# Patient Record
Sex: FEMALE | Race: WHITE | HISPANIC OR LATINO | Employment: PART TIME | URBAN - METROPOLITAN AREA
[De-identification: names, ages, dates, MRNs, and addresses within clinical notes are randomized per-mention and may not be internally consistent; named-entity substitution may affect disease eponyms.]

---

## 2018-08-31 ENCOUNTER — OFFICE VISIT (OUTPATIENT)
Dept: FAMILY MEDICINE CLINIC | Facility: CLINIC | Age: 43
End: 2018-08-31
Payer: COMMERCIAL

## 2018-08-31 VITALS
BODY MASS INDEX: 35.33 KG/M2 | HEART RATE: 72 BPM | DIASTOLIC BLOOD PRESSURE: 88 MMHG | HEIGHT: 62 IN | TEMPERATURE: 98.2 F | RESPIRATION RATE: 16 BRPM | WEIGHT: 192 LBS | OXYGEN SATURATION: 99 % | SYSTOLIC BLOOD PRESSURE: 120 MMHG

## 2018-08-31 DIAGNOSIS — Z13.1 DIABETES MELLITUS SCREENING: ICD-10-CM

## 2018-08-31 DIAGNOSIS — K21.9 GASTROESOPHAGEAL REFLUX DISEASE WITHOUT ESOPHAGITIS: Primary | ICD-10-CM

## 2018-08-31 DIAGNOSIS — Z13.220 NEED FOR LIPID SCREENING: ICD-10-CM

## 2018-08-31 DIAGNOSIS — Z01.419 WELL WOMAN EXAM: ICD-10-CM

## 2018-08-31 PROCEDURE — 99203 OFFICE O/P NEW LOW 30 MIN: CPT | Performed by: FAMILY MEDICINE

## 2018-08-31 PROCEDURE — 3008F BODY MASS INDEX DOCD: CPT | Performed by: FAMILY MEDICINE

## 2018-08-31 RX ORDER — PANTOPRAZOLE SODIUM 40 MG/1
40 TABLET, DELAYED RELEASE ORAL DAILY
Qty: 30 TABLET | Refills: 2 | Status: SHIPPED | OUTPATIENT
Start: 2018-08-31 | End: 2018-12-31

## 2018-08-31 NOTE — PROGRESS NOTES
Assessment/Plan:    No problem-specific Assessment & Plan notes found for this encounter  Diagnoses and all orders for this visit:    Gastroesophageal reflux disease without esophagitis  Stable controlled advise to continue same medicaiton and dose  -     pantoprazole (PROTONIX) 40 mg tablet; Take 1 tablet (40 mg total) by mouth daily for 30 days    Diabetes mellitus screening  -     Comprehensive metabolic panel; Future    Need for lipid screening  -     Lipid panel; Future    Well woman exam  -     Ambulatory referral to Obstetrics / Gynecology; Future     Follow up in 6 months to 1 year     Subjective:      Patient ID: Nirmala Pérez is a 41 y o  female  Patient is here loss care  She has a history of acid reflux disease and takes pantoprazole daily for her symptoms  Denies any side effects from medications  She denies any other medical problems at this time  She would like to have blood work done and also referred for and call use  She is not a smoker        The following portions of the patient's history were reviewed and updated as appropriate:   She  has no past medical history on file  She   Patient Active Problem List    Diagnosis Date Noted    Gastroesophageal reflux disease without esophagitis 08/31/2018    Diabetes mellitus screening 08/31/2018    Need for lipid screening 08/31/2018    Well woman exam 08/31/2018     She  has no past surgical history on file  Her family history is not on file  She  has no tobacco, alcohol, and drug history on file  Current Outpatient Prescriptions   Medication Sig Dispense Refill    pantoprazole (PROTONIX) 40 mg tablet Take 1 tablet (40 mg total) by mouth daily for 30 days 30 tablet 2     No current facility-administered medications for this visit  No current outpatient prescriptions on file prior to visit  No current facility-administered medications on file prior to visit  She has No Known Allergies       Review of Systems Constitutional: Negative for activity change, appetite change, fatigue and fever  HENT: Negative for congestion and ear discharge  Respiratory: Negative for cough and shortness of breath  Cardiovascular: Negative for chest pain and palpitations  Gastrointestinal: Negative for diarrhea and nausea  Musculoskeletal: Negative for arthralgias and back pain  Skin: Negative for color change and rash  Neurological: Negative for dizziness and headaches  Psychiatric/Behavioral: Negative for agitation and behavioral problems  Objective:      /88   Pulse 72   Temp 98 2 °F (36 8 °C) (Tympanic)   Resp 16   Ht 5' 2" (1 575 m)   Wt 87 1 kg (192 lb)   SpO2 99%   BMI 35 12 kg/m²          Physical Exam   Constitutional: She is oriented to person, place, and time  She appears well-developed and well-nourished  No distress  HENT:   Head: Normocephalic and atraumatic  Nose: Nose normal    Mouth/Throat: Oropharynx is clear and moist    Eyes: Conjunctivae are normal  Pupils are equal, round, and reactive to light  Cardiovascular: Normal rate, regular rhythm and normal heart sounds  No murmur heard  Pulmonary/Chest: Effort normal and breath sounds normal  No respiratory distress  She has no wheezes  Abdominal: Soft  Bowel sounds are normal  She exhibits no distension  There is no tenderness  Neurological: She is alert and oriented to person, place, and time  Skin: Skin is warm and dry  No rash noted  She is not diaphoretic  No erythema  Psychiatric: She has a normal mood and affect

## 2018-10-05 ENCOUNTER — OFFICE VISIT (OUTPATIENT)
Dept: OBGYN CLINIC | Facility: MEDICAL CENTER | Age: 43
End: 2018-10-05
Payer: COMMERCIAL

## 2018-10-05 VITALS
SYSTOLIC BLOOD PRESSURE: 122 MMHG | WEIGHT: 191.4 LBS | DIASTOLIC BLOOD PRESSURE: 68 MMHG | HEIGHT: 62 IN | BODY MASS INDEX: 35.22 KG/M2

## 2018-10-05 DIAGNOSIS — Z80.3 FAMILY HISTORY OF BREAST CANCER: ICD-10-CM

## 2018-10-05 DIAGNOSIS — R10.2 PELVIC PAIN: ICD-10-CM

## 2018-10-05 DIAGNOSIS — Z11.51 ENCOUNTER FOR SCREENING FOR HUMAN PAPILLOMAVIRUS (HPV): ICD-10-CM

## 2018-10-05 DIAGNOSIS — Z12.39 ENCOUNTER FOR SCREENING FOR MALIGNANT NEOPLASM OF BREAST: ICD-10-CM

## 2018-10-05 DIAGNOSIS — Z01.419 ENCOUNTER FOR GYNECOLOGICAL EXAMINATION (GENERAL) (ROUTINE) WITHOUT ABNORMAL FINDINGS: Primary | ICD-10-CM

## 2018-10-05 DIAGNOSIS — K25.7 CHRONIC GASTRIC ULCER WITHOUT HEMORRHAGE AND WITHOUT PERFORATION: ICD-10-CM

## 2018-10-05 DIAGNOSIS — K59.09 CHRONIC CONSTIPATION: ICD-10-CM

## 2018-10-05 DIAGNOSIS — Z12.4 CERVICAL CANCER SCREENING: ICD-10-CM

## 2018-10-05 PROBLEM — Z13.1 DIABETES MELLITUS SCREENING: Status: RESOLVED | Noted: 2018-08-31 | Resolved: 2018-10-05

## 2018-10-05 PROBLEM — Z13.220 NEED FOR LIPID SCREENING: Status: RESOLVED | Noted: 2018-08-31 | Resolved: 2018-10-05

## 2018-10-05 PROCEDURE — 87624 HPV HI-RISK TYP POOLED RSLT: CPT | Performed by: NURSE PRACTITIONER

## 2018-10-05 PROCEDURE — S0610 ANNUAL GYNECOLOGICAL EXAMINA: HCPCS | Performed by: NURSE PRACTITIONER

## 2018-10-05 PROCEDURE — G0145 SCR C/V CYTO,THINLAYER,RESCR: HCPCS | Performed by: NURSE PRACTITIONER

## 2018-10-05 RX ORDER — LEVOCETIRIZINE DIHYDROCHLORIDE 5 MG/1
5 TABLET, FILM COATED ORAL EVERY EVENING
Status: ON HOLD | COMMUNITY
End: 2019-02-05 | Stop reason: ALTCHOICE

## 2018-10-05 NOTE — ASSESSMENT & PLAN NOTE
C/o constipation for several months  She denies rectal bleeding  Reviewed dietary recommendations and advised to increase fluid and fiber intake  Also discussed OTC meds for stool softening and laxative  Referral given for GI for this complaint as well as h/o gastric ulcer  Consider this on diff for pelvic pain

## 2018-10-05 NOTE — ASSESSMENT & PLAN NOTE
C/o LLQ pain when laying on L side  Also with c/o constipation  Exam unremarkable  Pt is unable to provide urine spec for UPT but reports no preg concerns  She declines STI testing and reports no risk factors  Advised pelvic US and GI consult  Will advise as indicated

## 2018-10-05 NOTE — ASSESSMENT & PLAN NOTE
Maternal 1st cousin with breast ca at age 43  Recent diagnosis  Keagan Danilo is not sure whether or not her cousin has had genetic testing  Discussed opportunities for genetic counseling  She will consider

## 2018-10-05 NOTE — ASSESSMENT & PLAN NOTE
Normal findings on routine gyn exam  Advised monthly SBE, annual CBE and baseline screening mammo at age 38  Reviewed ASCCP guidelines and recommended pap with cotesting q3 yrs for this low risk patient  STI testing was offered and the patient declines; she reports low risk  The patient desires to continue current contraceptive method  Diet/activity recommendations  RTO in one year or sooner PRN

## 2018-10-09 LAB
HPV HR 12 DNA CVX QL NAA+PROBE: NEGATIVE
HPV16 DNA CVX QL NAA+PROBE: NEGATIVE
HPV18 DNA CVX QL NAA+PROBE: NEGATIVE

## 2018-10-10 LAB
LAB AP GYN PRIMARY INTERPRETATION: NORMAL
LAB AP LMP: NORMAL
Lab: NORMAL

## 2018-10-11 NOTE — PROGRESS NOTES
Assessment/Plan:    Family history of breast cancer  Maternal 1st cousin with breast ca at age 43  Recent diagnosis  Clementina Up is not sure whether or not her cousin has had genetic testing  Discussed opportunities for genetic counseling  She will consider  Chronic constipation  C/o constipation for several months  She denies rectal bleeding  Reviewed dietary recommendations and advised to increase fluid and fiber intake  Also discussed OTC meds for stool softening and laxative  Referral given for GI for this complaint as well as h/o gastric ulcer  Consider this on diff for pelvic pain  Pelvic pain  C/o LLQ pain when laying on L side  Also with c/o constipation  Exam unremarkable  Pt is unable to provide urine spec for UPT but reports no preg concerns  She declines STI testing and reports no risk factors  Advised pelvic US and GI consult  Will advise as indicated  Encounter for gynecological examination (general) (routine) without abnormal findings  Normal findings on routine gyn exam  Advised monthly SBE, annual CBE and baseline screening mammo at age 38  Reviewed ASCCP guidelines and recommended pap with cotesting q3 yrs for this low risk patient  STI testing was offered and the patient declines; she reports low risk  The patient desires to continue current contraceptive method  Diet/activity recommendations  RTO in one year or sooner PRN  Diagnoses and all orders for this visit:    Encounter for gynecological examination (general) (routine) without abnormal findings    Encounter for screening for malignant neoplasm of breast  -     Mammo screening bilateral w cad; Future    Cervical cancer screening  -     Liquid-based pap, screening  -     HPV High Risk; Future  -     HPV High Risk    Encounter for screening for human papillomavirus (HPV)  -     Liquid-based pap, screening  -     HPV High Risk;  Future  -     HPV High Risk    Family history of breast cancer    Chronic constipation  - Ambulatory referral to Gastroenterology; Future    Pelvic pain  -     US pelvis complete w transvaginal; Future    Chronic gastric ulcer without hemorrhage and without perforation  -     Ambulatory referral to Gastroenterology; Future    Other orders  -     levocetirizine (XYZAL) 5 MG tablet; Take 5 mg by mouth every evening          Subjective:      Patient ID: Donny Braun is a 41 y o  female  This new patient presents for routine annual gyn exam  Prior records are not present thus hx is per pt  Amara Lay is a 41 y o  [id number]  PMHx notable for gastric ulcer, chronic constipation  Gyn hx neg for abn pap or STI  FH notable for breast ca in mat 1st cousin (43y o )    C/o chronic left sided pelvic pain when laying on left side  She is not sure if this correlates to sx of constipation  She denies acute gyn complaints otherwise  Menses are regular and light to mod  She denies pelvic breast concerns, abnormal discharge, bowel/bladder dysfunction, depression/anxiety   and monogamous  She denies STI concerns  Withdrawal for contraception  Kids are well         The following portions of the patient's history were reviewed and updated as appropriate: allergies, current medications, past family history, past medical history, past social history, past surgical history and problem list     Review of Systems   Constitutional: Negative  HENT: Negative  Eyes: Negative  Respiratory: Negative  Cardiovascular: Negative  Gastrointestinal: Negative  Endocrine: Negative  Genitourinary: Negative  Musculoskeletal: Negative  Skin: Negative  Allergic/Immunologic: Negative  Neurological: Negative  Hematological: Negative  Psychiatric/Behavioral: Negative            Objective:      /68 (BP Location: Left arm, Cuff Size: Large)   Ht 5' 1 5" (1 562 m)   Wt 86 8 kg (191 lb 6 4 oz)   LMP 09/26/2018   BMI 35 58 kg/m²          Physical Exam   Constitutional: She is oriented to person, place, and time  She appears well-developed and well-nourished  HENT:   Head: Normocephalic and atraumatic  Eyes: Pupils are equal, round, and reactive to light  EOM are normal    Neck: Normal range of motion  Neck supple  No thyromegaly present  Cardiovascular: Normal rate, regular rhythm and normal heart sounds  Pulmonary/Chest: Effort normal and breath sounds normal  No respiratory distress  She has no wheezes  She has no rales  She exhibits no mass, no tenderness and no deformity  Right breast exhibits no inverted nipple, no mass, no nipple discharge, no skin change and no tenderness  Left breast exhibits no inverted nipple, no mass, no nipple discharge, no skin change and no tenderness  Breasts are symmetrical    Abdominal: Soft  She exhibits no distension and no mass  There is no splenomegaly or hepatomegaly  There is no tenderness  There is no rebound and no guarding  Genitourinary: Rectum normal, vagina normal and uterus normal  No breast swelling, tenderness or discharge  No labial fusion  There is no rash, tenderness, lesion or injury on the right labia  There is no rash, tenderness, lesion or injury on the left labia  Cervix exhibits no motion tenderness, no discharge and no friability  Right adnexum displays no mass, no tenderness and no fullness  Left adnexum displays no mass, no tenderness and no fullness  No erythema, tenderness or bleeding in the vagina  No foreign body in the vagina  No vaginal discharge found  Musculoskeletal: Normal range of motion  Lymphadenopathy:     She has no cervical adenopathy  She has no axillary adenopathy  Neurological: She is alert and oriented to person, place, and time  No cranial nerve deficit  Skin: Skin is warm and dry  No rash noted  No cyanosis  Nails show no clubbing  Psychiatric: She has a normal mood and affect   Her speech is normal and behavior is normal  Judgment and thought content normal  Cognition and memory are normal

## 2018-10-12 ENCOUNTER — TELEPHONE (OUTPATIENT)
Dept: OBGYN CLINIC | Facility: CLINIC | Age: 43
End: 2018-10-12

## 2018-10-12 NOTE — TELEPHONE ENCOUNTER
----- Message from Kale Castro, [address] sent at 10/11/2018  4:37 PM EDT -----  Normal pap and neg HPV   Please notify patient

## 2018-10-18 ENCOUNTER — OFFICE VISIT (OUTPATIENT)
Dept: FAMILY MEDICINE CLINIC | Facility: CLINIC | Age: 43
End: 2018-10-18
Payer: COMMERCIAL

## 2018-10-18 VITALS
SYSTOLIC BLOOD PRESSURE: 108 MMHG | TEMPERATURE: 97.1 F | BODY MASS INDEX: 35.91 KG/M2 | OXYGEN SATURATION: 99 % | DIASTOLIC BLOOD PRESSURE: 74 MMHG | HEIGHT: 62 IN | HEART RATE: 78 BPM | WEIGHT: 195.13 LBS | RESPIRATION RATE: 18 BRPM

## 2018-10-18 DIAGNOSIS — G43.809 OTHER MIGRAINE WITHOUT STATUS MIGRAINOSUS, NOT INTRACTABLE: ICD-10-CM

## 2018-10-18 DIAGNOSIS — J01.20 ACUTE NON-RECURRENT ETHMOIDAL SINUSITIS: Primary | ICD-10-CM

## 2018-10-18 PROBLEM — G43.909 MIGRAINE: Status: ACTIVE | Noted: 2018-10-18

## 2018-10-18 PROCEDURE — 99214 OFFICE O/P EST MOD 30 MIN: CPT | Performed by: NURSE PRACTITIONER

## 2018-10-18 RX ORDER — BUTALBITAL, ACETAMINOPHEN AND CAFFEINE 300; 40; 50 MG/1; MG/1; MG/1
1 CAPSULE ORAL 2 TIMES DAILY
Qty: 60 CAPSULE | Refills: 0 | Status: SHIPPED | OUTPATIENT
Start: 2018-10-18 | End: 2018-11-02 | Stop reason: ALTCHOICE

## 2018-10-18 RX ORDER — AMOXICILLIN AND CLAVULANATE POTASSIUM 875; 125 MG/1; MG/1
1 TABLET, FILM COATED ORAL EVERY 12 HOURS SCHEDULED
Qty: 14 TABLET | Refills: 0 | Status: SHIPPED | OUTPATIENT
Start: 2018-10-18 | End: 2018-10-25

## 2018-10-18 NOTE — PROGRESS NOTES
Assessment/Plan:    No problem-specific Assessment & Plan notes found for this encounter  Diagnoses and all orders for this visit:    Acute non-recurrent ethmoidal sinusitis  -     amoxicillin-clavulanate (AUGMENTIN) 875-125 mg per tablet; Take 1 tablet by mouth every 12 (twelve) hours for 7 days    Other migraine without status migrainosus, not intractable  -     Butalbital-APAP-Caffeine -40 MG CAPS; Take 1 capsule by mouth 2 (two) times a day for 30 days          Subjective:      Patient ID: Juanjo Lima is a 41 y o  female  Pt is here with reports of congestion and headache  She does have allergies  She currently has nasal congestion and sore throat  She has been having very bad headaches  Headaches started two weeks ago  Head pain is from left side to right side, across top of head   She is taking tylenol  But this isnt working  She is taking two tabs twice a day  She has been taking xyzal daily         The following portions of the patient's history were reviewed and updated as appropriate:   She  has a past medical history of Acid reflux disease; Allergic rhinitis, seasonal; Asthma; Migraine; and Ulcer of gastroesophageal junction  She   Patient Active Problem List    Diagnosis Date Noted    Migraine 10/18/2018    Acute non-recurrent ethmoidal sinusitis 10/18/2018    Chronic constipation 10/05/2018    Family history of breast cancer 10/05/2018    Pelvic pain 10/05/2018    Chronic gastric ulcer without hemorrhage and without perforation 10/05/2018    Gastroesophageal reflux disease without esophagitis 08/31/2018    Encounter for gynecological examination (general) (routine) without abnormal findings 08/31/2018     She  has a past surgical history that includes [place] tooth extraction    Her family history includes Anxiety disorder in her brother, daughter, mother, and sister; Asthma in her son; Diabetes in her maternal grandmother; Hyperlipidemia in her mother; No Known Problems in her brother, brother, brother, maternal grandfather, paternal grandfather, paternal grandmother, and son  She  reports that she has never smoked  She has never used smokeless tobacco  She reports that she drinks alcohol  She reports that she does not use drugs  Current Outpatient Prescriptions   Medication Sig Dispense Refill    amoxicillin-clavulanate (AUGMENTIN) 875-125 mg per tablet Take 1 tablet by mouth every 12 (twelve) hours for 7 days 14 tablet 0    Butalbital-APAP-Caffeine -40 MG CAPS Take 1 capsule by mouth 2 (two) times a day for 30 days 60 capsule 0    levocetirizine (XYZAL) 5 MG tablet Take 5 mg by mouth every evening      pantoprazole (PROTONIX) 40 mg tablet Take 1 tablet (40 mg total) by mouth daily for 30 days 30 tablet 2     No current facility-administered medications for this visit  Current Outpatient Prescriptions on File Prior to Visit   Medication Sig    levocetirizine (XYZAL) 5 MG tablet Take 5 mg by mouth every evening    pantoprazole (PROTONIX) 40 mg tablet Take 1 tablet (40 mg total) by mouth daily for 30 days     No current facility-administered medications on file prior to visit  She has No Known Allergies       Review of Systems   Constitutional: Positive for fatigue  Negative for chills and fever  HENT: Positive for congestion and sore throat  Eyes: Negative for visual disturbance  Respiratory: Negative for cough, shortness of breath and wheezing  Cardiovascular: Negative for chest pain  Gastrointestinal: Positive for nausea  Endocrine: Negative for polydipsia and polyuria  Musculoskeletal: Negative for neck pain and neck stiffness  Skin: Negative for pallor and rash  Allergic/Immunologic: Positive for environmental allergies  Negative for immunocompromised state  Neurological: Positive for headaches  Negative for weakness  Hematological: Positive for adenopathy  All other systems reviewed and are negative          Objective:      BP 108/74 (BP Location: Left arm, Patient Position: Sitting)   Pulse 78   Temp (!) 97 1 °F (36 2 °C)   Resp 18   Ht 5' 1 5" (1 562 m)   Wt 88 5 kg (195 lb 2 oz)   LMP 09/26/2018   SpO2 99%   BMI 36 27 kg/m²          Physical Exam   Constitutional: She is oriented to person, place, and time  She appears well-developed and well-nourished  No distress  HENT:   Head: Normocephalic and atraumatic  Right Ear: External ear normal    Left Ear: External ear normal    Nose: Nose normal    Mouth/Throat: Oropharynx is clear and moist  No oropharyngeal exudate  Frontal sinus tenderness  Swollen nasal turbinates   Eyes: Pupils are equal, round, and reactive to light  Conjunctivae and EOM are normal  Right eye exhibits no discharge  Left eye exhibits no discharge  No scleral icterus  Neck: Normal range of motion  Neck supple  Cardiovascular: Normal rate, regular rhythm and normal heart sounds  Exam reveals no gallop and no friction rub  No murmur heard  Pulmonary/Chest: Effort normal and breath sounds normal  No respiratory distress  She has no wheezes  She has no rales  Abdominal: Soft  Musculoskeletal: Normal range of motion  Lymphadenopathy:     She has no cervical adenopathy  Neurological: She is alert and oriented to person, place, and time  She exhibits normal muscle tone  Coordination normal    Skin: Skin is warm and dry  No rash noted  She is not diaphoretic  Psychiatric: She has a normal mood and affect  Her behavior is normal  Judgment and thought content normal    Nursing note and vitals reviewed

## 2018-10-18 NOTE — PATIENT INSTRUCTIONS
Sinus infection- take antibiotics as prescribed  You may take a decongestant, such as Sudafed   Drink plenty of water and get rest  Migraine headache- refill Fiorcet

## 2018-10-24 ENCOUNTER — HOSPITAL ENCOUNTER (OUTPATIENT)
Dept: RADIOLOGY | Facility: MEDICAL CENTER | Age: 43
Discharge: HOME | End: 2018-10-24
Payer: COMMERCIAL

## 2018-10-24 DIAGNOSIS — Z12.39 ENCOUNTER FOR SCREENING FOR MALIGNANT NEOPLASM OF BREAST: ICD-10-CM

## 2018-10-24 DIAGNOSIS — R10.2 PELVIC PAIN: ICD-10-CM

## 2018-10-24 PROCEDURE — 77067 SCR MAMMO BI INCL CAD: CPT

## 2018-10-24 PROCEDURE — 76856 US EXAM PELVIC COMPLETE: CPT

## 2018-10-24 PROCEDURE — 76830 TRANSVAGINAL US NON-OB: CPT

## 2018-10-29 ENCOUNTER — TELEPHONE (OUTPATIENT)
Dept: OBGYN CLINIC | Facility: CLINIC | Age: 43
End: 2018-10-29

## 2018-10-29 NOTE — TELEPHONE ENCOUNTER
----- Message from Nicole Finney sent at 10/26/2018  2:53 PM EDT -----  Normal pelvic US   Please notify patient   F/u with PCP to discuss LLQ pain and h/o constipation

## 2018-10-29 NOTE — TELEPHONE ENCOUNTER
Patient is aware of pelvic u/s results  Is also aware she needs to f/u with her PCP in regards to the LLQ pain and h/o constipation

## 2018-11-02 ENCOUNTER — OFFICE VISIT (OUTPATIENT)
Dept: FAMILY MEDICINE CLINIC | Facility: CLINIC | Age: 43
End: 2018-11-02
Payer: COMMERCIAL

## 2018-11-02 VITALS
TEMPERATURE: 97.9 F | SYSTOLIC BLOOD PRESSURE: 108 MMHG | HEART RATE: 86 BPM | WEIGHT: 195 LBS | OXYGEN SATURATION: 98 % | HEIGHT: 62 IN | DIASTOLIC BLOOD PRESSURE: 70 MMHG | BODY MASS INDEX: 35.88 KG/M2

## 2018-11-02 DIAGNOSIS — G43.809 OTHER MIGRAINE WITHOUT STATUS MIGRAINOSUS, NOT INTRACTABLE: ICD-10-CM

## 2018-11-02 DIAGNOSIS — Z91.09 ENVIRONMENTAL ALLERGIES: Primary | ICD-10-CM

## 2018-11-02 PROBLEM — R10.2 PELVIC PAIN: Status: RESOLVED | Noted: 2018-10-05 | Resolved: 2018-11-02

## 2018-11-02 PROBLEM — J01.20 ACUTE NON-RECURRENT ETHMOIDAL SINUSITIS: Status: RESOLVED | Noted: 2018-10-18 | Resolved: 2018-11-02

## 2018-11-02 PROBLEM — Z01.419 ENCOUNTER FOR GYNECOLOGICAL EXAMINATION (GENERAL) (ROUTINE) WITHOUT ABNORMAL FINDINGS: Status: RESOLVED | Noted: 2018-08-31 | Resolved: 2018-11-02

## 2018-11-02 PROCEDURE — 99214 OFFICE O/P EST MOD 30 MIN: CPT | Performed by: FAMILY MEDICINE

## 2018-11-02 PROCEDURE — 1036F TOBACCO NON-USER: CPT | Performed by: FAMILY MEDICINE

## 2018-11-02 PROCEDURE — 3008F BODY MASS INDEX DOCD: CPT | Performed by: FAMILY MEDICINE

## 2018-11-02 RX ORDER — MOMETASONE FUROATE 50 UG/1
2 SPRAY, METERED NASAL DAILY
Qty: 17 G | Refills: 0 | Status: SHIPPED | OUTPATIENT
Start: 2018-11-02 | End: 2019-01-25

## 2018-11-02 RX ORDER — SUMATRIPTAN 50 MG/1
50 TABLET, FILM COATED ORAL ONCE AS NEEDED
Qty: 9 TABLET | Refills: 0 | Status: SHIPPED | OUTPATIENT
Start: 2018-11-02 | End: 2019-01-25

## 2018-11-02 RX ORDER — METHYLPREDNISOLONE 4 MG/1
TABLET ORAL
Qty: 21 TABLET | Refills: 0 | Status: SHIPPED | OUTPATIENT
Start: 2018-11-02 | End: 2019-01-25

## 2018-11-02 NOTE — PROGRESS NOTES
Luda Storm 1975 female MRN: [id number]    Acute Visit        ASSESSMENT/PLAN  Diagnoses and all orders for this visit:    Environmental allergies  -     Methylprednisolone 4 MG TBPK; Use as directed on package  -     mometasone (NASONEX) 50 mcg/act nasal spray; 2 sprays into each nostril daily    Other migraine without status migrainosus, not intractable  -     SUMAtriptan (IMITREX) 50 mg tablet; Take 1 tablet (50 mg total) by mouth once as needed for migraine for up to 1 dose          Will treat with steroids and Nasonex steroid nasal spray for the allergies  She is on Xyzal   For the migraine she can use Imitrex as needed  If her migraines are persistent and frequent she needs to return or go see Neurology to discuss prophylactic medication  Call in 1 week if not better    Future Appointments  Date Time Provider Cathy Huerta   12/3/2018 4:40 PM Augie Martinez MD [facility]   10/9/2019 10:20 AM QUITA [facility]        SUBJECTIVE  CC: Sinusitis       She was seen on October 18th for sinus problems  Was treated with Augmentin  She completed the abx  She has a history of allergies is on Xyzal which she states is not helping  Over the years she has tried all the different antihistamines I e  Allegra Zyrtec and Claritin with no relief  She was on Flonase but she states it was not helping  She tried Sudafed with no relief  Today she complains of continued congestion and post nasal drip, pressure in head  Worse at night  She has occasional migraines as well  She gets nausea with these headaches  She was prescribed Fioricet which does not seem to help  She takes Tylenol  Can't take NSAIDs due to stomach ulcer  Sinusitis   Associated symptoms include congestion and headaches  Pertinent negatives include no coughing or shortness of breath       Luda Storm is a 41 y o  female who presented for an acute visit complaining of  Review of Systems Constitutional: Negative for activity change  HENT: Positive for congestion, postnasal drip and sinus pain  Respiratory: Negative for cough, chest tightness and shortness of breath  Cardiovascular: Negative for chest pain and leg swelling  Gastrointestinal: Negative  Neurological: Positive for headaches  Psychiatric/Behavioral: Negative for dysphoric mood  The patient is not nervous/anxious          Historical Information   The patient history was reviewed as follows:  Past Medical History:   Diagnosis Date    Acid reflux disease     Allergic rhinitis, seasonal     Asthma     Migraine     Ulcer of gastroesophageal junction      Past Surgical History:   Procedure Laterality Date    WISDOM TOOTH EXTRACTION       Family History   Problem Relation Age of Onset    Anxiety disorder Mother     Hyperlipidemia Mother     Anxiety disorder Sister     No Known Problems Brother     Anxiety disorder Daughter     Asthma Son     Diabetes Maternal Grandmother     No Known Problems Maternal Grandfather     No Known Problems Paternal Grandmother     No Known Problems Paternal Grandfather     No Known Problems Son     No Known Problems Brother     Anxiety disorder Brother     No Known Problems Brother       Social History   History   Alcohol Use    Yes     History   Drug Use No     History   Smoking Status    Never Smoker   Smokeless Tobacco    Never Used       Medications:   Meds/Allergies   Current Outpatient Prescriptions   Medication Sig Dispense Refill    levocetirizine (XYZAL) 5 MG tablet Take 5 mg by mouth every evening      Methylprednisolone 4 MG TBPK Use as directed on package 21 tablet 0    mometasone (NASONEX) 50 mcg/act nasal spray 2 sprays into each nostril daily 17 g 0    pantoprazole (PROTONIX) 40 mg tablet Take 1 tablet (40 mg total) by mouth daily for 30 days 30 tablet 2    SUMAtriptan (IMITREX) 50 mg tablet Take 1 tablet (50 mg total) by mouth once as needed for migraine for up to 1 dose 9 tablet 0     No current facility-administered medications for this visit  No Known Allergies    OBJECTIVE  Vitals:   Vitals:    11/02/18 0807   BP: 108/70   Pulse: 86   Temp: 97 9 °F (36 6 °C)   SpO2: 98%   Weight: 88 5 kg (195 lb)   Height: 5' 1 5" (1 562 m)       Invasive Devices          No matching active lines, drains, or airways          Physical Exam   Constitutional: She is oriented to person, place, and time  She appears well-developed and well-nourished  No distress  HENT:   Right Ear: Hearing, tympanic membrane, external ear and ear canal normal    Left Ear: Hearing, tympanic membrane, external ear and ear canal normal    Nose: Mucosal edema present  Right sinus exhibits maxillary sinus tenderness and frontal sinus tenderness  Left sinus exhibits maxillary sinus tenderness and frontal sinus tenderness  Mouth/Throat: Uvula is midline and mucous membranes are normal  No oropharyngeal exudate or posterior oropharyngeal erythema  Clear postnasal drainage   Eyes: Pupils are equal, round, and reactive to light  Conjunctivae are normal    Cardiovascular: Normal rate and normal heart sounds  Exam reveals no gallop and no friction rub  No murmur heard  Pulmonary/Chest: Effort normal and breath sounds normal  No respiratory distress  She has no wheezes  She has no rales  Lymphadenopathy:     She has no cervical adenopathy  Neurological: She is alert and oriented to person, place, and time  No cranial nerve deficit  Skin: Skin is warm  No rash noted  Psychiatric: She has a normal mood and affect  Her behavior is normal  Judgment and thought content normal    Nursing note and vitals reviewed  Lab:  I have personally reviewed all pertinent results

## 2018-11-02 NOTE — PATIENT INSTRUCTIONS
Allergies   AMBULATORY CARE:   Allergies  are an immune system reaction to a substance called an allergen  Your immune system sees the allergen as harmful and attacks it  Common signs and symptoms include the following:   · Mild symptoms  include sneezing and a runny, itchy, or stuffy nose  You may also have swollen, watery, or itchy eyes, or skin itching  You may have swelling or pain where an insect bit or stung you  · Anaphylaxis symptoms  include trouble breathing or swallowing, a rash or hives, or severe swelling  You may also have a cough, wheezing, or feel lightheaded or dizzy  Anaphylaxis is a sudden, life-threatening reaction that needs immediate treatment  Call 911 for signs or symptoms of anaphylaxis,  such as trouble breathing, swelling in your mouth or throat, or wheezing  You may also have itching, a rash, hives, or feel like you are going to faint  Seek care immediately if:   · You have tingling in your hands or feet  · Your skin is red or flushed  Contact your healthcare provider if:   · You have questions or concerns about your condition or care  Steps to take for signs or symptoms of anaphylaxis:   · Immediately  give 1 shot of epinephrine only into the outer thigh muscle  · Leave the shot in place  as directed  Your healthcare provider may recommend you leave it in place for up to 10 seconds before you remove it  This helps make sure all of the epinephrine is delivered  · Call 911 and go to the emergency department,  even if the shot improved symptoms  Do not drive yourself  Bring the used epinephrine shot with you  Treatment for allergies  may include any of the following:  · Antihistamines  help decrease itching, sneezing, and swelling  You may take them as a pill or use drops in your nose or eyes  · Decongestants  help your nose feel less stuffy  · Steroids  decrease swelling and redness  · Topical treatments  help decrease itching or swelling   You also may be given nasal sprays or eyedrops  · Epinephrine  is medicine used to treat severe allergic reactions such as anaphylaxis  · Desensitization  gets your body used to allergens you cannot avoid  Your healthcare provider will give you a shot that contains a small amount of an allergen  He will treat any allergic reaction you have  He will give you more of the allergen a little at a time until your body gets used to it  Your reaction to the allergen may be less serious after this treatment  Your healthcare provider will tell you how long to get the shots  Safety precautions to take if you are at risk for anaphylaxis:   · Keep 2 shots of epinephrine with you at all times  You may need a second shot, because epinephrine only works for about 20 minutes and symptoms may return  Your healthcare provider can show you and family members how to give the shot  Check the expiration date every month and replace it before it expires  · Create an action plan  Your healthcare provider can help you create a written plan that explains the allergy and an emergency plan to treat a reaction  The plan explains when to give a second epinephrine shot if symptoms return or do not improve after the first  Give copies of the action plan and emergency instructions to family members, work and school staff, and  providers  Show them how to give a shot of epinephrine  · Be careful when you exercise  If you have had exercise-induced anaphylaxis, do not exercise right after you eat  Stop exercising right away if you start to develop any signs or symptoms of anaphylaxis  You may first feel tired, warm, or have itchy skin  Hives, swelling, and severe breathing problems may develop if you continue to exercise  · Carry medical alert identification  Wear medical alert jewelry or carry a card that explains the allergy  Ask your healthcare provider where to get these items  · Inform all healthcare providers of the allergy  This includes dentists, nurses, doctors, and surgeons  Manage allergies:   · Use nasal rinses as directed  Rinse with a saline solution daily to help clear your nose of allergens  · Do not smoke  Allergy symptoms may decrease if you are not around smoke  Nicotine and other chemicals in cigarettes and cigars can cause lung damage  Ask your healthcare provider for information if you currently smoke and need help to quit  E-cigarettes or smokeless tobacco still contain nicotine  Talk to your healthcare provider before you use these products  Prevent an allergic reaction:   · Do not go outside when pollen counts are high if you have seasonal allergies  Your symptoms may be better if you go outside only in the morning or evening  Use your air conditioner, and change air filters often  · Avoid dust, fur, and mold  Dust and vacuum your home often  You may want to wear a mask when you vacuum  Keep pets in certain rooms, and bathe them often  Use a dehumidifier (machine that decreases moisture) to help prevent mold  · Do not use products that contain latex if you have a latex allergy  Use nonlatex gloves if you work in healthcare or in food preparation  Always tell healthcare providers about a latex allergy  · Avoid areas that attract insects if you have an insect bite or sting allergy  Areas include trash cans, gardens, and picnics  Do not wear bright clothing or strong scents when you will be outside  Follow up with your healthcare provider as directed:  Write down your questions so you remember to ask them during your visits  When you have an allergic reaction, write down everything you were exposed to in the 2 hours before the reaction  Take that information to your next visit  © 2017 [employer] Information is for End User's use only and may not be sold, redistributed or otherwise used for commercial purposes   All illustrations and images included in CareNotes® are the copyrighted property of [employer]  or [employer]  The above information is an  only  It is not intended as medical advice for individual conditions or treatments  Talk to your doctor, nurse or pharmacist before following any medical regimen to see if it is safe and effective for you

## 2018-12-31 ENCOUNTER — OFFICE VISIT (OUTPATIENT)
Dept: URGENT CARE | Facility: CLINIC | Age: 43
End: 2018-12-31
Payer: COMMERCIAL

## 2018-12-31 VITALS
RESPIRATION RATE: 18 BRPM | HEART RATE: 89 BPM | BODY MASS INDEX: 34.41 KG/M2 | HEIGHT: 62 IN | OXYGEN SATURATION: 97 % | DIASTOLIC BLOOD PRESSURE: 82 MMHG | TEMPERATURE: 99.9 F | WEIGHT: 187 LBS | SYSTOLIC BLOOD PRESSURE: 120 MMHG

## 2018-12-31 DIAGNOSIS — J06.9 UPPER RESPIRATORY TRACT INFECTION, UNSPECIFIED TYPE: Primary | ICD-10-CM

## 2018-12-31 PROCEDURE — 99213 OFFICE O/P EST LOW 20 MIN: CPT | Performed by: PHYSICIAN ASSISTANT

## 2018-12-31 RX ORDER — DEXTROMETHORPHAN HYDROBROMIDE AND PROMETHAZINE HYDROCHLORIDE 15; 6.25 MG/5ML; MG/5ML
5 SYRUP ORAL 4 TIMES DAILY PRN
Qty: 118 ML | Refills: 0 | Status: SHIPPED | OUTPATIENT
Start: 2018-12-31 | End: 2019-01-25

## 2018-12-31 NOTE — PROGRESS NOTES
[facility]        NAME: Angelica Rodríguez is a 41 y o  female  : 1975    MRN: [id number]  DATE: 2018  TIME: 5:27 PM    Assessment and Plan   Upper respiratory tract infection, unspecified type [J06 9]  1  Upper respiratory tract infection, unspecified type  promethazine-dextromethorphan (PHENERGAN-DM) 6 25-15 mg/5 mL oral syrup         Patient Instructions     1  Upper respiratory infection- likely viral  -Patient declined flu swab and chest xray  -Take cough medicine as directed  -Increase fluids  -Tylenol/motrin  -Salt H20 gargles/throat lozenges  -Saline nasal spray  -Try using humidifier at nighttime    -Follow-up with PCP within 5 days  -Go to ER with worsening symptoms, difficulty breathing, high fever, or any signs of distress    Chief Complaint     Chief Complaint   Patient presents with    Cough     x3 d, producing yellow mucous  Pt is taking mucinex, last dose 0900 tday  Pt did not get flu shot   Generalized Body Aches    Fever     x1 day 99 9          History of Present Illness       Patient is a 70-year-old female who presents today for evaluation of cold symptoms for the past 3 days  Patient admits to a cough that she states at times is bringing up yellow mucus  Patient admits to generalized body aches and low-grade fevers  Patient has been taking Mucinex at home  She did not get a flu shot  Review of Systems   Review of Systems   Constitutional: Positive for fever  Negative for chills  HENT: Negative for ear pain, rhinorrhea and sore throat  Respiratory: Positive for cough  Negative for shortness of breath  Cardiovascular: Negative for chest pain  Musculoskeletal: Positive for arthralgias  Neurological: Negative for headaches           Current Medications       Current Outpatient Prescriptions:     levocetirizine (XYZAL) 5 MG tablet, Take 5 mg by mouth every evening, Disp: , Rfl:     Methylprednisolone 4 MG TBPK, Use as directed on package (Patient not taking: Reported on 12/31/2018 ), Disp: 21 tablet, Rfl: 0    mometasone (NASONEX) 50 mcg/act nasal spray, 2 sprays into each nostril daily (Patient not taking: Reported on 12/31/2018 ), Disp: 17 g, Rfl: 0    promethazine-dextromethorphan (PHENERGAN-DM) 6 25-15 mg/5 mL oral syrup, Take 5 mL by mouth 4 (four) times a day as needed for cough, Disp: 118 mL, Rfl: 0    SUMAtriptan (IMITREX) 50 mg tablet, Take 1 tablet (50 mg total) by mouth once as needed for migraine for up to 1 dose (Patient not taking: Reported on 12/31/2018 ), Disp: 9 tablet, Rfl: 0    Current Allergies     Allergies as of 12/31/2018    (No Known Allergies)            The following portions of the patient's history were reviewed and updated as appropriate: allergies, current medications, past family history, past medical history, past social history, past surgical history and problem list      Past Medical History:   Diagnosis Date    Acid reflux disease     Allergic rhinitis, seasonal     Asthma     Migraine     Ulcer of gastroesophageal junction        Past Surgical History:   Procedure Laterality Date    WISDOM TOOTH EXTRACTION         Family History   Problem Relation Age of Onset    Anxiety disorder Mother     Hyperlipidemia Mother     Anxiety disorder Sister     No Known Problems Brother     Anxiety disorder Daughter     Asthma Son     Diabetes Maternal Grandmother     No Known Problems Maternal Grandfather     No Known Problems Paternal Grandmother     No Known Problems Paternal Grandfather     No Known Problems Son     No Known Problems Brother     Anxiety disorder Brother     No Known Problems Brother          Medications have been verified  Objective   /82   Pulse 89   Temp 99 9 °F (37 7 °C) (Temporal)   Resp 18   Ht 5' 2" (1 575 m)   Wt 84 8 kg (187 lb)   SpO2 97%   BMI 34 20 kg/m²        Physical Exam     Physical Exam   Constitutional: She is oriented to person, place, and time   She appears well-developed and well-nourished  No distress  HENT:   Right Ear: Tympanic membrane and external ear normal    Left Ear: Tympanic membrane and external ear normal    Nose: Nose normal    Mouth/Throat: Uvula is midline, oropharynx is clear and moist and mucous membranes are normal    Eyes: Pupils are equal, round, and reactive to light  Conjunctivae and EOM are normal    Neck: Normal range of motion  Neck supple  Cardiovascular: Normal rate, regular rhythm and normal heart sounds  Pulmonary/Chest: Effort normal and breath sounds normal  She has no wheezes  She has no rales  Lymphadenopathy:     She has no cervical adenopathy  Neurological: She is alert and oriented to person, place, and time  Skin: Skin is warm and dry  Psychiatric: She has a normal mood and affect  Nursing note and vitals reviewed

## 2018-12-31 NOTE — PATIENT INSTRUCTIONS
1  Upper respiratory infection- likely viral  -Patient declined flu swab and chest xray  -Take cough medicine as directed  -Increase fluids  -Tylenol/motrin  -Salt H20 gargles/throat lozenges  -Saline nasal spray  -Try using humidifier at nighttime    -Follow-up with PCP within 5 days  -Go to ER with worsening symptoms, difficulty breathing, high fever, or any signs of distress    Upper Respiratory Infection   AMBULATORY CARE:   An upper respiratory infection  is also called a common cold  It can affect your nose, throat, ears, and sinuses  Common signs and symptoms include the following:  Cold symptoms are usually worst for the first 3 to 5 days  You may have any of the following:  · Runny or stuffy nose    · Sneezing and coughing    · Sore throat or hoarseness    · Red, watery, and sore eyes    · Fatigue     · Chills and fever    · Headache, body aches, or sore muscles  Seek care immediately if:   · You have chest pain or trouble breathing  Contact your healthcare provider if:   · You have a fever over 102ºF (39°C)  · Your sore throat gets worse or you see white or yellow spots in your throat  · Your symptoms get worse after 3 to 5 days or your cold is not better in 14 days  · You have a rash anywhere on your skin  · You have large, tender lumps in your neck  · You have thick, green or yellow drainage from your nose  · You cough up thick yellow, green, or bloody mucus  · You have vomiting for more than 24 hours and cannot keep fluids down  · You have a bad earache  · You have questions or concerns about your condition or care  Treatment for a cold: There is no cure for the common cold  Colds are caused by viruses and do not get better with antibiotics  Most people get better in 7 to 14 days  You may continue to cough for 2 to 3 weeks  The following may help decrease your symptoms:  · Decongestants  help reduce nasal congestion and help you breathe more easily   If you take decongestant pills, they may make you feel restless or not able to sleep  Do not use decongestant sprays for more than a few days  · Cough suppressants  help reduce coughing  Ask your healthcare provider which type of cough medicine is best for you  · NSAIDs , such as ibuprofen, help decrease swelling, pain, and fever  NSAIDs can cause stomach bleeding or kidney problems in certain people  If you take blood thinner medicine, always ask your healthcare provider if NSAIDs are safe for you  Always read the medicine label and follow directions  · Acetaminophen  decreases pain and fever  It is available without a doctor's order  Ask how much to take and how often to take it  Follow directions  Read the labels of all other medicines you are using to see if they also contain acetaminophen, or ask your doctor or pharmacist  Acetaminophen can cause liver damage if not taken correctly  Do not use more than 4 grams (4,000 milligrams) total of acetaminophen in one day  Manage your cold:   · Rest as much as possible  Slowly start to do more each day  · Drink more liquids as directed  Liquids will help thin and loosen mucus so you can cough it up  Liquids will also help prevent dehydration  Liquids that help prevent dehydration include water, fruit juice, and broth  Do not drink liquids that contain caffeine  Caffeine can increase your risk for dehydration  Ask your healthcare provider how much liquid to drink each day  · Soothe a sore throat  Gargle with warm salt water  This helps your sore throat feel better  Make salt water by dissolving ¼ teaspoon salt in 1 cup warm water  You may also suck on hard candy or throat lozenges  You may use a sore throat spray  · Use a humidifier or vaporizer  Use a cool mist humidifier or a vaporizer to increase air moisture in your home  This may make it easier for you to breathe and help decrease your cough  · Use saline nasal drops as directed    These help relieve congestion  · Apply petroleum-based jelly around the outside of your nostrils  This can decrease irritation from blowing your nose  · Do not smoke  Nicotine and other chemicals in cigarettes and cigars can make your symptoms worse  They can also cause infections such as bronchitis or pneumonia  Ask your healthcare provider for information if you currently smoke and need help to quit  E-cigarettes or smokeless tobacco still contain nicotine  Talk to your healthcare provider before you use these products  Prevent spreading your cold to others:   · Try to stay away from other people during the first 2 to 3 days of your cold when it is more easily spread  · Do not share food or drinks  · Do not share hand towels with household members  · Wash your hands often, especially after you blow your nose  Turn away from other people and cover your mouth and nose with a tissue when you sneeze or cough  Follow up with your healthcare provider as directed:  Write down your questions so you remember to ask them during your visits  © 2017 [employer]  Information is for End User's use only and may not be sold, redistributed or otherwise used for commercial purposes  All illustrations and images included in CareNotes® are the copyrighted property of [employer]  or [employer]  The above information is an  only  It is not intended as medical advice for individual conditions or treatments  Talk to your doctor, nurse or pharmacist before following any medical regimen to see if it is safe and effective for you

## 2019-01-25 ENCOUNTER — OFFICE VISIT (OUTPATIENT)
Dept: GASTROENTEROLOGY | Facility: CLINIC | Age: 44
End: 2019-01-25
Payer: COMMERCIAL

## 2019-01-25 VITALS
BODY MASS INDEX: 34.97 KG/M2 | SYSTOLIC BLOOD PRESSURE: 118 MMHG | TEMPERATURE: 72 F | DIASTOLIC BLOOD PRESSURE: 72 MMHG | WEIGHT: 191.2 LBS

## 2019-01-25 DIAGNOSIS — R10.84 GENERALIZED ABDOMINAL PAIN: Primary | ICD-10-CM

## 2019-01-25 PROBLEM — R10.13 EPIGASTRIC PAIN: Status: ACTIVE | Noted: 2019-01-25

## 2019-01-25 PROBLEM — K25.7 CHRONIC GASTRIC ULCER WITHOUT HEMORRHAGE AND WITHOUT PERFORATION: Status: RESOLVED | Noted: 2018-10-05 | Resolved: 2019-01-25

## 2019-01-25 PROBLEM — K59.09 CHRONIC CONSTIPATION: Status: RESOLVED | Noted: 2018-10-05 | Resolved: 2019-01-25

## 2019-01-25 PROCEDURE — 99204 OFFICE O/P NEW MOD 45 MIN: CPT | Performed by: INTERNAL MEDICINE

## 2019-01-25 RX ORDER — ACETAMINOPHEN 500 MG
500 TABLET ORAL EVERY 4 HOURS PRN
COMMUNITY
End: 2020-12-04

## 2019-01-25 NOTE — PATIENT INSTRUCTIONS
I do not suspect you have an active stomach ulcer however it is important that we repeat your upper endoscopy to prove this  I feel your right sided pain is musculoskeletal in nature and this will likely need to be treated with an antiinflammatory medication

## 2019-01-25 NOTE — PROGRESS NOTES
[facility] - Outpatient Consultation  Sylvia Manuel 41 y o  female MRN: [id number]  Encounter: [id number]          ASSESSMENT AND PLAN:      1  Generalized abdominal pain  Pain is mostly in the lower abdomen and right side of body  The right sided body pain is not gastrointestinal in nature - this seems to be musculoskeletal    ______________________________________________________________________    HPI:  41year old female presents with complaints of abdominal pain  She has a history of PUD diagnosed in [place] several years ago  She reports right sided body pain which she describes as musculoskeletal   This pain starts in her right shoulder and radiates down the right side of her body to her right leg  This started in December when she was sick with a viral type illness  She denies upper abdominal pain although she has occasional heartburn and nausea  She does reports a lower abdominal/pelvic pain and a change in the BMs  She does have occasional diarrhea if she eats pasta (although this seems to be more related to the sauce )  She notes if she strains she gets anal pain and at one time had an external hemorrhoid from this  She drinks a cup of coffee every day and this allows her to have a BM without straining  Her last EGD was approximately 3 years ago  She has never had a colonoscopy  REVIEW OF SYSTEMS:    CONSTITUTIONAL: Denies any fever, chills, rigors, and weight loss  HEENT: No earache or tinnitus  Denies hearing loss or visual disturbances  CARDIOVASCULAR: No chest pain or palpitations  RESPIRATORY: Denies any cough, hemoptysis, shortness of breath or dyspnea on exertion  GASTROINTESTINAL: As noted in the History of Present Illness  GENITOURINARY: No problems with urination  Denies any hematuria or dysuria  NEUROLOGIC: No dizziness or vertigo, denies headaches  MUSCULOSKELETAL: Denies any muscle or joint pain  SKIN: Denies skin rashes or itching  ENDOCRINE: Denies excessive thirst  Denies intolerance to heat or cold  PSYCHOSOCIAL: Denies depression or anxiety  Denies any recent memory loss  Historical Information   Past Medical History:   Diagnosis Date    Acid reflux disease     Allergic rhinitis, seasonal     Asthma     Migraine     Ulcer of gastroesophageal junction      Past Surgical History:   Procedure Laterality Date    WISDOM TOOTH EXTRACTION       Social History   History   Alcohol Use    Yes     Comment: social     History   Drug Use No     History   Smoking Status    Never Smoker   Smokeless Tobacco    Never Used     Family History   Problem Relation Age of Onset    Anxiety disorder Mother     Hyperlipidemia Mother     Anxiety disorder Sister     No Known Problems Brother     Anxiety disorder Daughter     Asthma Son     Diabetes Maternal Grandmother     No Known Problems Maternal Grandfather     No Known Problems Paternal Grandmother     No Known Problems Paternal Grandfather     No Known Problems Son     No Known Problems Brother     Anxiety disorder Brother     No Known Problems Brother        Meds/Allergies       Current Outpatient Prescriptions:     acetaminophen (TYLENOL) 500 mg tablet    levocetirizine (XYZAL) 5 MG tablet    Methylprednisolone 4 MG TBPK    mometasone (NASONEX) 50 mcg/act nasal spray    promethazine-dextromethorphan (PHENERGAN-DM) 6 25-15 mg/5 mL oral syrup    SUMAtriptan (IMITREX) 50 mg tablet    No Known Allergies        Objective     Blood pressure 118/72, temperature (!) 72 °F (22 2 °C), weight 86 7 kg (191 lb 3 2 oz)  Body mass index is 34 97 kg/m²          PHYSICAL EXAM:      General Appearance:   Alert, cooperative, no distress   HEENT:   Normocephalic, atraumatic, anicteric      Neck:  Supple, symmetrical, trachea midline   Lungs:   Clear to auscultation bilaterally; no rales, rhonchi or wheezing; respirations unlabored    Heart[de-identified]   Regular rate and rhythm; no murmur, rub, or gallop  Abdomen:   Soft, non-tender, non-distended; normal bowel sounds; no masses, no organomegaly    Genitalia:   Deferred    Rectal:   Deferred    Extremities:  No cyanosis, clubbing or edema    Pulses:  2+ and symmetric    Skin:  No jaundice, rashes, or lesions    Lymph nodes:  No palpable cervical lymphadenopathy        Lab Results:   No visits with results within 1 Day(s) from this visit  Latest known visit with results is:   Office Visit on 10/05/2018   Component Date Value    Case Report 10/05/2018                      Value:Gynecologic Cytology Report                       Case: [id number]                                  Authorizing Provider:  QUITA Hammond       Collected:           10/05/2018 1027              Ordering Location:     [facility]      Received:            10/05/2018 1027                                     [facility]                                                                          First Screen:          THERESA Espinosa                                                         Specimen:    LIQUID-BASED PAP, SCREENING, Cervix, Endocervical                                          Primary Interpretation 10/05/2018 Negative for intraepithelial lesion or malignancy     Specimen Adequacy 10/05/2018 Satisfactory for evaluation  Endocervical/transformation zone component present   Additional Information 10/05/2018                      Value: This result contains rich text formatting which cannot be displayed here   LMP 10/05/2018 9/26/2018     HPV Other HR 10/05/2018 Negative     HPV16 10/05/2018 Negative     HPV18 10/05/2018 Negative          Radiology Results:   No results found

## 2019-02-05 ENCOUNTER — ANESTHESIA EVENT (OUTPATIENT)
Dept: PERIOP | Facility: HOSPITAL | Age: 44
End: 2019-02-05
Payer: COMMERCIAL

## 2019-02-05 ENCOUNTER — HOSPITAL ENCOUNTER (OUTPATIENT)
Facility: HOSPITAL | Age: 44
Setting detail: OUTPATIENT SURGERY
Discharge: HOME | End: 2019-02-05
Attending: INTERNAL MEDICINE | Admitting: INTERNAL MEDICINE
Payer: COMMERCIAL

## 2019-02-05 ENCOUNTER — ANESTHESIA (OUTPATIENT)
Dept: PERIOP | Facility: HOSPITAL | Age: 44
End: 2019-02-05
Payer: COMMERCIAL

## 2019-02-05 ENCOUNTER — TELEPHONE (OUTPATIENT)
Dept: GASTROENTEROLOGY | Facility: CLINIC | Age: 44
End: 2019-02-05

## 2019-02-05 VITALS
HEART RATE: 87 BPM | WEIGHT: 192.02 LBS | HEIGHT: 62 IN | DIASTOLIC BLOOD PRESSURE: 80 MMHG | TEMPERATURE: 97.8 F | RESPIRATION RATE: 19 BRPM | OXYGEN SATURATION: 99 % | SYSTOLIC BLOOD PRESSURE: 128 MMHG | BODY MASS INDEX: 35.34 KG/M2

## 2019-02-05 DIAGNOSIS — R10.13 EPIGASTRIC PAIN: ICD-10-CM

## 2019-02-05 DIAGNOSIS — K21.9 GASTROESOPHAGEAL REFLUX DISEASE WITHOUT ESOPHAGITIS: Primary | ICD-10-CM

## 2019-02-05 LAB — EXT PREGNANCY TEST URINE: NEGATIVE

## 2019-02-05 PROCEDURE — 88305 TISSUE EXAM BY PATHOLOGIST: CPT | Performed by: PATHOLOGY

## 2019-02-05 PROCEDURE — 43239 EGD BIOPSY SINGLE/MULTIPLE: CPT | Performed by: INTERNAL MEDICINE

## 2019-02-05 PROCEDURE — 81025 URINE PREGNANCY TEST: CPT | Performed by: ANESTHESIOLOGY

## 2019-02-05 RX ORDER — PANTOPRAZOLE SODIUM 40 MG/1
40 TABLET, DELAYED RELEASE ORAL DAILY
Qty: 90 TABLET | Refills: 1 | Status: SHIPPED | OUTPATIENT
Start: 2019-02-05 | End: 2020-12-04 | Stop reason: SDUPTHER

## 2019-02-05 RX ORDER — LIDOCAINE HYDROCHLORIDE 10 MG/ML
INJECTION, SOLUTION EPIDURAL; INFILTRATION; INTRACAUDAL; PERINEURAL AS NEEDED
Status: DISCONTINUED | OUTPATIENT
Start: 2019-02-05 | End: 2019-02-05 | Stop reason: SURG

## 2019-02-05 RX ORDER — SODIUM CHLORIDE, SODIUM LACTATE, POTASSIUM CHLORIDE, CALCIUM CHLORIDE 600; 310; 30; 20 MG/100ML; MG/100ML; MG/100ML; MG/100ML
75 INJECTION, SOLUTION INTRAVENOUS CONTINUOUS
Status: DISCONTINUED | OUTPATIENT
Start: 2019-02-05 | End: 2019-02-05 | Stop reason: HOSPADM

## 2019-02-05 RX ORDER — PROPOFOL 10 MG/ML
INJECTION, EMULSION INTRAVENOUS AS NEEDED
Status: DISCONTINUED | OUTPATIENT
Start: 2019-02-05 | End: 2019-02-05 | Stop reason: SURG

## 2019-02-05 RX ADMIN — PROPOFOL 140 MG: 10 INJECTION, EMULSION INTRAVENOUS at 10:43

## 2019-02-05 RX ADMIN — PROPOFOL 20 MG: 10 INJECTION, EMULSION INTRAVENOUS at 10:45

## 2019-02-05 RX ADMIN — LIDOCAINE HYDROCHLORIDE 20 MG: 10 INJECTION, SOLUTION EPIDURAL; INFILTRATION; INTRACAUDAL; PERINEURAL at 10:43

## 2019-02-05 RX ADMIN — PROPOFOL 20 MG: 10 INJECTION, EMULSION INTRAVENOUS at 10:48

## 2019-02-05 RX ADMIN — PROPOFOL 20 MG: 10 INJECTION, EMULSION INTRAVENOUS at 10:46

## 2019-02-05 RX ADMIN — SODIUM CHLORIDE, SODIUM LACTATE, POTASSIUM CHLORIDE, AND CALCIUM CHLORIDE 75 ML/HR: .6; .31; .03; .02 INJECTION, SOLUTION INTRAVENOUS at 10:19

## 2019-02-05 NOTE — ANESTHESIA PREPROCEDURE EVALUATION
Review of Systems/Medical History  Patient summary reviewed  Chart reviewed      Cardiovascular  Negative cardio ROS    Pulmonary  Asthma , well controlled/ stable ,        GI/Hepatic    GERD well controlled,        Negative  ROS        Endo/Other  Negative endo/other ROS      GYN  Negative gynecology ROS          Hematology  Negative hematology ROS      Musculoskeletal  Negative musculoskeletal ROS        Neurology  Negative neurology ROS   Headaches,    Psychology   Negative psychology ROS              Physical Exam    Airway    Mallampati score: II  TM Distance: >3 FB  Neck ROM: full     Dental       Cardiovascular  Comment: Negative ROS, Rhythm: regular, Rate: normal, Cardiovascular exam normal    Pulmonary  Pulmonary exam normal     Other Findings        Anesthesia Plan  ASA Score- 2     Anesthesia Type- IV sedation with anesthesia with ASA Monitors  Additional Monitors:   Airway Plan:         Plan Factors-    Induction- intravenous  Postoperative Plan- Plan for postoperative opioid use  Informed Consent- Anesthetic plan and risks discussed with patient  I personally reviewed this patient with the CRNA  Discussed and agreed on the Anesthesia Plan with the RICH Leyva

## 2019-02-05 NOTE — H&P
History and Physical -  [facility]  Luda Storm 41 y o  female MRN: [id number]      HPI: Luda Storm is a 41y o  year old female who presents for the evaluation of epigastric abdominal pain      REVIEW OF SYSTEMS: Per the HPI, and otherwise unremarkable  Historical Information   Past Medical History:   Diagnosis Date    Acid reflux disease     Allergic rhinitis, seasonal     Asthma     Migraine     Ulcer of gastroesophageal junction      Past Surgical History:   Procedure Laterality Date    WISDOM TOOTH EXTRACTION       Social History   History   Alcohol Use    Yes     Comment: social     History   Drug Use No     History   Smoking Status    Never Smoker   Smokeless Tobacco    Never Used     Family History   Problem Relation Age of Onset    Anxiety disorder Mother     Hyperlipidemia Mother     Anxiety disorder Sister     No Known Problems Brother     Anxiety disorder Daughter     Asthma Son     Diabetes Maternal Grandmother     No Known Problems Maternal Grandfather     No Known Problems Paternal Grandmother     No Known Problems Paternal Grandfather     No Known Problems Son     No Known Problems Brother     Anxiety disorder Brother     No Known Problems Brother        Meds/Allergies     Prescriptions Prior to Admission   Medication    acetaminophen (TYLENOL) 500 mg tablet       No Known Allergies    Objective     Last menstrual period 01/17/2019  PHYSICAL EXAM    Gen: NAD  CV: RRR  CHEST: Clear  ABD: soft, NT/ND  EXT: no edema      ASSESSMENT/PLAN:  This is a 41y o  year old female here for the evaluation of epigastric abdominal pain, and she is stable and optimized for her procedure      Perform esophagogastroduodenoscopy with biopsies

## 2019-02-05 NOTE — ANESTHESIA POSTPROCEDURE EVALUATION
Post-Op Assessment Note      CV Status:  Stable    Mental Status:  Awake    Hydration Status:  Stable    PONV Controlled:  None    Airway Patency:  Patent and adequate    Post Op Vitals Reviewed: Yes          Staff: CRNA       Comments: coughing          BP   119/76   Temp      Pulse 100   Resp   16   SpO2   97

## 2019-02-05 NOTE — DISCHARGE INSTRUCTIONS
Upper Endoscopy   WHAT YOU NEED TO KNOW:   An upper endoscopy is also called an upper gastrointestinal (GI) endoscopy, or an esophagogastroduodenoscopy (EGD)  You may feel bloated, gassy, or have some abdominal discomfort after your procedure  Your throat may be sore for 24 to 36 hours  You may burp or pass gas from air that is still inside your body  DISCHARGE INSTRUCTIONS:   Call 911 if:   · You have sudden chest pain or trouble breathing  Seek care immediately if:   · You feel dizzy or faint  · You have trouble swallowing  · You have severe throat pain  · Your bowel movements are very dark or black  · Your abdomen is hard and firm and you have severe pain  · You vomit blood  Contact your healthcare provider if:   · You feel full or bloated and cannot burp or pass gas  · You have not had a bowel movement for 3 days after your procedure  · You have neck pain  · You have a fever or chills  · You have nausea or are vomiting  · You have a rash or hives  · You have questions or concerns about your endoscopy  Relieve a sore throat:  Suck on throat lozenges or crushed ice  Gargle with a small amount of warm salt water  Mix 1 teaspoon of salt and 1 cup of warm water to make salt water  Relieve gas and discomfort from bloating:  Lie on your right side with a heating pad on your abdomen  Take short walks to help pass gas  Eat small meals until bloating is relieved  Rest after your procedure:  Do not drive or make important decisions until the day after your procedure  Return to your normal activity as directed  You can usually return to work the day after your procedure  Follow up with your healthcare provider as directed:  Write down your questions so you remember to ask them during your visits  © 2017 [employer]  Information is for End User's use only and may not be sold, redistributed or otherwise used for commercial purposes   All illustrations and images included in [employer] are the copyrighted property of [employer] , [employer]  or [employer]  The above information is an  only  It is not intended as medical advice for individual conditions or treatments  Talk to your doctor, nurse or pharmacist before following any medical regimen to see if it is safe and effective for you

## 2019-02-05 NOTE — OP NOTE
Is these ESOPHAGOGASTRODUODENOSCOPY    PROCEDURE: EGD    SEDATION: Monitored anesthesia care, check anesthesia records    ASA Class: 2    INDICATIONS:  Epigastric abdominal pain    CONSENT:  Informed consent was obtained for the procedure, including sedation after explaining the risks and benefits of the procedure  Risks including but not limited to bleeding, perforation, infection, and missed lesion  PREPARATION:   Telemetry, pulse oximetry, blood pressure were monitored throughout the procedure  Patient was identified by myself both verbally and by visual inspection of ID band  DESCRIPTION:   Patient was placed in the left lateral decubitus position and was sedated with the above medication  The gastroscope was introduced in to the oropharynx and the esophagus was intubated under direct visualization  Scope was passed down the esophagus up to 2nd part of the duodenum  A careful inspection was made as the gastroscope was withdrawn, including a retroflexed view of the stomach; findings and interventions are described below  FINDINGS:    #1  Esophagus- there were 2 small fingers of potential metaplasia at the EG junction 38 cm from the incisors  Two cold biopsies were obtained to rule out Hdez's esophagus    #2  Stomach- small 1 cm hiatal hernia was identified  The remaining cardia, fundus, body, antrum, pylorus were otherwise normal in appearance  For routine cold biopsies were obtained from the antrum to rule out Helicobacter pylori    #3  Duodenum- the 1st and 2nd portion of the duodenum were normal         IMPRESSIONS:      1  Epigastric abdominal pain, etiology uncertain, rule out Helicobacter pylori  2  Small 1 cm hiatal hernia  3  2 small fingers of possible metaplasia, biopsies taken to rule out Hdez's esophagus     RECOMMENDATIONS:     1  Continue with current medical therapy  2  Follow-up on routine biopsies     COMPLICATIONS:  None; patient tolerated the procedure well      SPECIMENS:  2 cold biopsies obtained at the EG junction 38 cm from the incisors to rule out Hdez's esophagus    For cold biopsies obtained from the antrum to rule out Helicobacter pylori  ESTIMATED BLOOD LOSS:  Minimal

## 2019-02-07 ENCOUNTER — TELEPHONE (OUTPATIENT)
Dept: GASTROENTEROLOGY | Facility: CLINIC | Age: 44
End: 2019-02-07

## 2019-02-07 DIAGNOSIS — R10.11 RIGHT UPPER QUADRANT ABDOMINAL PAIN: Primary | ICD-10-CM

## 2019-02-07 NOTE — TELEPHONE ENCOUNTER
Spoke with patient  She is feeling better  She is on Nexium, will continue for a total of 6-8 weeks  Will order RUQ US   Please call and set patient up for this Thank you

## 2019-02-13 ENCOUNTER — HOSPITAL ENCOUNTER (OUTPATIENT)
Dept: ULTRASOUND IMAGING | Facility: HOSPITAL | Age: 44
Discharge: HOME | End: 2019-02-13
Attending: INTERNAL MEDICINE
Payer: COMMERCIAL

## 2019-02-13 DIAGNOSIS — R10.11 RIGHT UPPER QUADRANT ABDOMINAL PAIN: ICD-10-CM

## 2019-02-13 PROCEDURE — 76705 ECHO EXAM OF ABDOMEN: CPT

## 2019-02-14 ENCOUNTER — TELEPHONE (OUTPATIENT)
Dept: GASTROENTEROLOGY | Facility: CLINIC | Age: 44
End: 2019-02-14

## 2019-02-14 NOTE — TELEPHONE ENCOUNTER
----- Message from Larisa Wilcox DO sent at 2/14/2019  2:13 PM EST -----  Please inform patient that the US is normal Thank you

## 2019-11-08 ENCOUNTER — OFFICE VISIT (OUTPATIENT)
Dept: OBGYN CLINIC | Facility: CLINIC | Age: 44
End: 2019-11-08

## 2019-11-08 VITALS
DIASTOLIC BLOOD PRESSURE: 75 MMHG | HEART RATE: 77 BPM | HEIGHT: 62 IN | WEIGHT: 189 LBS | SYSTOLIC BLOOD PRESSURE: 107 MMHG | BODY MASS INDEX: 34.78 KG/M2

## 2019-11-08 DIAGNOSIS — N92.6 MISSED MENSES: ICD-10-CM

## 2019-11-08 DIAGNOSIS — Z32.02 URINE PREGNANCY TEST NEGATIVE: ICD-10-CM

## 2019-11-08 DIAGNOSIS — Z31.9 PATIENT DESIRES PREGNANCY: Primary | ICD-10-CM

## 2019-11-08 LAB — SL AMB POCT URINE HCG: NEGATIVE

## 2019-11-08 PROCEDURE — 81025 URINE PREGNANCY TEST: CPT | Performed by: NURSE PRACTITIONER

## 2019-11-08 PROCEDURE — 99202 OFFICE O/P NEW SF 15 MIN: CPT | Performed by: NURSE PRACTITIONER

## 2019-11-08 RX ORDER — ASCORBIC ACID, CHOLECALCIFEROL, .ALPHA.-TOCOPHEROL ACETATE, DL-, PYRIDOXINE HYDROCHLORIDE, FOLIC ACID, CYANOCOBALAMIN, BIOTIN, CALCIUM CARBONATE, FERROUS ASPARTO GLYCINATE, IRON, POTASSIUM IODIDE, MAGNESIUM OXIDE, DOCONEXENT AND LOWBUSH BLUEBERRY 60; 1000; 10; 26; 400; 13; 280; 80; 9; 9; 150; 25; 350; 25; 600 MG/1; [IU]/1; [IU]/1; MG/1; UG/1; UG/1; UG/1; MG/1; MG/1; MG/1; UG/1; MG/1; MG/1; MG/1; UG/1
1 CAPSULE, GELATIN COATED ORAL DAILY
Qty: 28 CAPSULE | Refills: 6 | Status: SHIPPED | OUTPATIENT
Start: 2019-11-08 | End: 2019-11-22 | Stop reason: SDUPTHER

## 2019-11-08 NOTE — PROGRESS NOTES
Assessment/Plan:         Diagnoses and all orders for this visit:    Patient desires pregnancy  -     Zwlevl-QxVfr-VbZmj-Meth-FA-DHA (PRENATE MINI) 18-0 6-0 4-350 MG CAPS; Take 1 tablet by mouth daily for 28 days   reviewed increased risk with AMA maternal and fetal   review to begin prenatal vitamins 1 pill daily   the patient due for annual gyn exam return to office  In 2 weeks  Missed menses  -     POCT urine HCG   menses beginning today  Urine pregnancy test negative          Subjective:      Patient ID: Marisabel Red is a 45 y o  female  HPI 82-year-old  023 new patient here for missed period and needs UPT  Her LMP was 2019  She has a negative pregnancy test today in the office  Patient reports her menses will sometimes be up to 5 days late  Patient reports she has vaginal  pinching discomfort,  She is not sure if it is related to coming menses  In reports she is attempting pregnancy for the past few months  We discussed increased maternal and fetal risks with AMA  patient denies any genetic disorders  She  Is up-to-date with her vaccine  She would like a prenatal vitamin prescribed  The following portions of the patient's history were reviewed and updated as appropriate: allergies, current medications, past family history, past medical history, past social history, past surgical history and problem list     Review of Systems   Constitutional: Negative  Respiratory: Negative  Cardiovascular: Negative  Genitourinary: Positive for vaginal pain  Neurological: Negative  Psychiatric/Behavioral: Negative  Objective: There were no vitals taken for this visit  Physical Exam   Constitutional: She appears well-nourished  Cardiovascular: Normal rate, regular rhythm and normal heart sounds  Pulmonary/Chest: Breath sounds normal  No respiratory distress  Abdominal: Soft  There is no tenderness  Skin: Skin is warm and dry        External genitalia - no lesions  Vagina-small amount brown discharge  Cervix-no lesions negative CMT  Uterus-anteverted, nontender      Adnexa -no masses, nontender     UPT is negative today

## 2019-11-21 NOTE — PROGRESS NOTES
ASSESSMENT & PLAN: Charla Farias is a 45 y o  [id number] with normal gynecologic exam     1   Routine well woman exam done today  2  Pap and HPV:  The patient's last pap and hpv was  10/05/2018  It was normal     Pap and cotesting was not done today  Current ASCCP Guidelines reviewed  Due in 2023,   Annual due inn 1 year  3  Mammogram ordered- reviewed for pt to get during her menses or when she can be certain she is not pregnant  4  The following were reviewed in today's visit: breast self exam, mammography screening ordered, STD testing, menopause, adequate intake of calcium and vitamin D, exercise, healthy diet and tobacco cessation  5  Prenatal vitamins ordered and Rx printed for pt  CC:  Annual Gynecologic Examination    HPI: Charla Farias is a 45 y o  [id number] who presents for annual gynecologic examination  Her last mammogram was 10/24/2018 it was BI-RADS 1, negative  LMP 11/9/2019  She reports that menses was later and lighter than normal  Menses monthly can be 5 days late and last 3-7 days  She has the following concerns:  She needs Rx for prenatal vitamins reports pharmacy did not receive- will print a Rx for pt  Pt desires to get pregnant, last child 6 years ago same partner  Pt aware of risks with AMA fetal and maternal    She is up to chanda on vaccines    Health Maintenance:    She wears her seatbelt routinely  She does perform regular monthly self breast exams  She feels safe at home       Patient Active Problem List   Diagnosis    Gastroesophageal reflux disease without esophagitis    Family history of breast cancer    Migraine    Epigastric pain    Patient desires pregnancy    Urine pregnancy test negative    Missed menses       Past Medical History:   Diagnosis Date    Acid reflux disease     Allergic rhinitis, seasonal     Asthma     Hiatal hernia     Migraine     Ulcer of gastroesophageal junction        Past Surgical History:   Procedure Laterality Date    CT EGD TRANSORAL BIOPSY SINGLE/MULTIPLE N/A 2019    Procedure: ESOPHAGOGASTRODUODENOSCOPY (EGD); Surgeon: You Matson DO;  Location: [facility]; Service: Gastroenterology    WISDOM TOOTH EXTRACTION         Past OB/Gyn History:  OB History        5    Para   3    Term   3            AB   2    Living   3       SAB   1    TAB        Ectopic        Multiple        Live Births   3                  Pt does not have menstrual issues  Just last one was late and lighter  History of sexually transmitted infection: No   History of abnormal pap smears: No      Patient is currently sexually active  heterosexual  The current method of family planning is none      Family History   Problem Relation Age of Onset    Anxiety disorder Mother     Hyperlipidemia Mother     Anxiety disorder Sister     No Known Problems Brother     Anxiety disorder Daughter     Asthma Son     Diabetes Maternal Grandmother     No Known Problems Maternal Grandfather     No Known Problems Paternal Grandmother     No Known Problems Paternal Grandfather     No Known Problems Son     No Known Problems Brother     Anxiety disorder Brother     No Known Problems Brother        Social History:  Social History     Socioeconomic History    Marital status: /Civil Union     Spouse name: Not on file    Number of children: Not on file    Years of education: Not on file    Highest education level: Not on file   Occupational History    Not on file   Social Needs    Financial resource strain: Not on file    Food insecurity:     Worry: Not on file     Inability: Not on file    Transportation needs:     Medical: Not on file     Non-medical: Not on file   Tobacco Use    Smoking status: Never Smoker    Smokeless tobacco: Never Used   Substance and Sexual Activity    Alcohol use: Yes     Comment: social    Drug use: No    Sexual activity: Yes     Partners: Male     Birth control/protection: Coitus interruptus     Comment:  Lifestyle    Physical activity:     Days per week: Not on file     Minutes per session: Not on file    Stress: Not on file   Relationships    Social connections:     Talks on phone: Not on file     Gets together: Not on file     Attends Restorationist service: Not on file     Active member of club or organization: Not on file     Attends meetings of clubs or organizations: Not on file     Relationship status: Not on file    Intimate partner violence:     Fear of current or ex partner: Not on file     Emotionally abused: Not on file     Physically abused: Not on file     Forced sexual activity: Not on file   Other Topics Concern    Not on file   Social History Narrative    Not on file     Presently lives with family  Patient is   Patient is currently employed   No Known Allergies    Current Outpatient Medications:     acetaminophen (TYLENOL) 500 mg tablet, Take 500 mg by mouth every 4 (four) hours as needed for mild pain (daily), Disp: , Rfl:     pantoprazole (PROTONIX) 40 mg tablet, Take 1 tablet (40 mg total) by mouth daily, Disp: 90 tablet, Rfl: 1    Fltjvj-GaLtz-KzGqq-Meth-FA-DHA (PRENATE MINI) 18-0 6-0 4-350 MG CAPS, Take 1 tablet by mouth daily for 28 days, Disp: 28 capsule, Rfl: 6    Review of Systems:    Review of Systems  Constitutional :no fever, feels well, no tiredness, no recent weight gain or loss  ENT: no ear ache, no loss of hearing, no nosebleeds or nasal discharge, no sore throat or hoarseness  Cardiovascular: no complaints of slow or fast heart beat, no chest pain, no palpitations, no leg claudication or lower extremity edema    Respiratory: no complaints of shortness of shortness of breath, no GENAO  Breasts:no complaints of breast pain, breast lump, or nipple discharge  Gastrointestinal: no complaints of abdominal pain, constipation, nausea, vomiting, or diarrhea or bloody stools  Genitourinary : no complaints of dysuria, incontinence, pelvic pain, no dysmenorrhea, vaginal discharge or abnormal vaginal bleeding and as noted in HPI  Musculoskeletal: no complaints of arthralgia, no myalgia, no joint swelling or stiffness, no limb pain or swelling  Integumentary: no complaints of skin rash or lesion, itching or dry skin  Neurological: no complaints of headache, no confusion, no numbness or tingling, no dizziness or fainting    Objective      There were no vitals taken for this visit  General:   appears stated age, cooperative, alert normal mood and affect   Neck: normal, supple,trachea midline, no masses   Heart: regular rate and rhythm, S1, S2 normal, no murmur, click, rub or gallop   Lungs: clear to auscultation bilaterally   Breasts: normal appearance, no masses or tenderness, Inspection negative, No nipple retraction or dimpling, No nipple discharge or bleeding, No axillary or supraclavicular adenopathy, Normal to palpation without dominant masses, Taught monthly breast self examination   Abdomen: soft, non-tender, without masses or organomegaly   Vulva: normal female genitalia, Bartholin's, Urethra, [place] normal   Vagina: normal vagina, no discharge, exudate, lesion, or erythema   Urethra: normal   Cervix: Normal, no discharge  Nontender  Uterus: normal size, contour, position, consistency, mobility, non-tender and anteverted   Adnexa: no mass, fullness, tenderness   Lymphatic palpation of lymph nodes in neck, axilla, groin and/or other locations: no lymphadenopathy or masses noted   Skin normal skin turgor and no rashes     Psychiatric orientation to person, place, and time: normal  mood and affect: normal

## 2019-11-22 ENCOUNTER — ANNUAL EXAM (OUTPATIENT)
Dept: OBGYN CLINIC | Facility: CLINIC | Age: 44
End: 2019-11-22

## 2019-11-22 VITALS
HEIGHT: 61 IN | WEIGHT: 192 LBS | DIASTOLIC BLOOD PRESSURE: 69 MMHG | BODY MASS INDEX: 36.25 KG/M2 | HEART RATE: 87 BPM | SYSTOLIC BLOOD PRESSURE: 101 MMHG

## 2019-11-22 DIAGNOSIS — Z12.31 ENCOUNTER FOR SCREENING MAMMOGRAM FOR MALIGNANT NEOPLASM OF BREAST: ICD-10-CM

## 2019-11-22 DIAGNOSIS — Z01.419 ENCOUNTER FOR GYNECOLOGICAL EXAMINATION (GENERAL) (ROUTINE) WITHOUT ABNORMAL FINDINGS: Primary | ICD-10-CM

## 2019-11-22 DIAGNOSIS — Z31.9 PATIENT DESIRES PREGNANCY: ICD-10-CM

## 2019-11-22 PROCEDURE — 99396 PREV VISIT EST AGE 40-64: CPT | Performed by: NURSE PRACTITIONER

## 2019-11-22 RX ORDER — ASCORBIC ACID, CHOLECALCIFEROL, .ALPHA.-TOCOPHEROL ACETATE, DL-, PYRIDOXINE HYDROCHLORIDE, FOLIC ACID, CYANOCOBALAMIN, BIOTIN, CALCIUM CARBONATE, FERROUS ASPARTO GLYCINATE, IRON, POTASSIUM IODIDE, MAGNESIUM OXIDE, DOCONEXENT AND LOWBUSH BLUEBERRY 60; 1000; 10; 26; 400; 13; 280; 80; 9; 9; 150; 25; 350; 25; 600 MG/1; [IU]/1; [IU]/1; MG/1; UG/1; UG/1; UG/1; MG/1; MG/1; MG/1; UG/1; MG/1; MG/1; MG/1; UG/1
1 CAPSULE, GELATIN COATED ORAL DAILY
Qty: 28 CAPSULE | Refills: 6 | Status: SHIPPED | OUTPATIENT
Start: 2019-11-22 | End: 2020-12-04

## 2019-11-25 ENCOUNTER — OFFICE VISIT (OUTPATIENT)
Dept: FAMILY MEDICINE CLINIC | Facility: CLINIC | Age: 44
End: 2019-11-25
Payer: COMMERCIAL

## 2019-11-25 VITALS
HEART RATE: 91 BPM | OXYGEN SATURATION: 99 % | TEMPERATURE: 99.2 F | HEIGHT: 61 IN | WEIGHT: 192.8 LBS | BODY MASS INDEX: 36.4 KG/M2 | SYSTOLIC BLOOD PRESSURE: 112 MMHG | DIASTOLIC BLOOD PRESSURE: 70 MMHG

## 2019-11-25 DIAGNOSIS — J01.00 ACUTE NON-RECURRENT MAXILLARY SINUSITIS: Primary | ICD-10-CM

## 2019-11-25 PROCEDURE — 1036F TOBACCO NON-USER: CPT | Performed by: NURSE PRACTITIONER

## 2019-11-25 PROCEDURE — 99213 OFFICE O/P EST LOW 20 MIN: CPT | Performed by: NURSE PRACTITIONER

## 2019-11-25 RX ORDER — METHYLPREDNISOLONE 4 MG/1
TABLET ORAL
Qty: 21 EACH | Refills: 0 | Status: SHIPPED | OUTPATIENT
Start: 2019-11-25 | End: 2019-12-23 | Stop reason: ALTCHOICE

## 2019-11-25 NOTE — PROGRESS NOTES
Assessment/Plan:    No problem-specific Assessment & Plan notes found for this encounter  Diagnoses and all orders for this visit:    Acute non-recurrent maxillary sinusitis  Comments:  will send in for medrol dose pack for her sinus congestion   Orders:  -     methylPREDNISolone 4 MG tablet therapy pack; Use as directed on package          Subjective:      Patient ID: Haritha Ambrocio is a 45 y o  female  Patient here and reports that she is having sinus pain and pressure congestion taken OTC allergy medications and tried Flonase and just not going away and has been having symptoms for the past several days  Positive sick contacts with similar symptoms with her son       The following portions of the patient's history were reviewed and updated as appropriate:   She  has a past medical history of Acid reflux disease, Allergic rhinitis, seasonal, Asthma, Hiatal hernia, Migraine, and Ulcer of gastroesophageal junction  She   Patient Active Problem List    Diagnosis Date Noted    Acute non-recurrent maxillary sinusitis 11/25/2019    Encounter for gynecological examination (general) (routine) without abnormal findings 11/22/2019    Encounter for screening mammogram for malignant neoplasm of breast 11/22/2019    Patient desires pregnancy 11/08/2019    Urine pregnancy test negative 11/08/2019    Missed menses 11/08/2019    Epigastric pain 01/25/2019    Migraine 10/18/2018    Family history of breast cancer 10/05/2018    Gastroesophageal reflux disease without esophagitis 08/31/2018     She  has a past surgical history that includes [place] tooth extraction and pr egd transoral biopsy single/multiple (N/A, 2/5/2019)    Her family history includes Anxiety disorder in her brother, daughter, mother, and sister; Asthma in her son; Diabetes in her maternal grandmother; Hyperlipidemia in her mother; No Known Problems in her brother, brother, brother, maternal grandfather, paternal grandfather, paternal grandmother, and son  She  reports that she has never smoked  She has never used smokeless tobacco  She reports that she drinks alcohol  She reports that she does not use drugs  She has No Known Allergies       Review of Systems   Constitutional: Positive for chills, fatigue and fever  HENT: Positive for congestion, postnasal drip, rhinorrhea, sinus pressure and sinus pain  Negative for sore throat  Eyes: Negative  Respiratory: Negative  Cardiovascular: Negative  Gastrointestinal: Negative  Endocrine: Negative  Genitourinary: Negative  Musculoskeletal: Negative  Allergic/Immunologic: Negative  Neurological: Negative  Hematological: Negative  Psychiatric/Behavioral: Negative  Objective:      /70   Pulse 91   Temp 99 2 °F (37 3 °C)   Ht 5' 1 22" (1 555 m)   Wt 87 5 kg (192 lb 12 8 oz)   LMP 11/09/2019   SpO2 99%   BMI 36 17 kg/m²          Physical Exam   Constitutional: She is oriented to person, place, and time  Vital signs are normal  She appears well-developed and well-nourished  No distress  HENT:   Head: Normocephalic and atraumatic  Right Ear: A middle ear effusion is present  Left Ear: A middle ear effusion is present  Nose: Rhinorrhea present  Right sinus exhibits maxillary sinus tenderness  Left sinus exhibits maxillary sinus tenderness  Mouth/Throat: Uvula is midline, oropharynx is clear and moist and mucous membranes are normal    Eyes: Pupils are equal, round, and reactive to light  Neck: Normal range of motion  No thyromegaly present  Cardiovascular: Normal rate, regular rhythm, normal heart sounds and intact distal pulses  No murmur heard  Pulmonary/Chest: Effort normal and breath sounds normal  No respiratory distress  She has no wheezes  Abdominal: Soft  Bowel sounds are normal    Musculoskeletal: Normal range of motion  Neurological: She is alert and oriented to person, place, and time  Skin: Skin is warm and dry  Psychiatric: She has a normal mood and affect  Nursing note and vitals reviewed

## 2019-12-23 ENCOUNTER — OFFICE VISIT (OUTPATIENT)
Dept: FAMILY MEDICINE CLINIC | Facility: CLINIC | Age: 44
End: 2019-12-23
Payer: COMMERCIAL

## 2019-12-23 VITALS
OXYGEN SATURATION: 98 % | WEIGHT: 192 LBS | TEMPERATURE: 98.9 F | HEART RATE: 76 BPM | BODY MASS INDEX: 35.33 KG/M2 | HEIGHT: 62 IN | SYSTOLIC BLOOD PRESSURE: 108 MMHG | DIASTOLIC BLOOD PRESSURE: 62 MMHG

## 2019-12-23 DIAGNOSIS — J01.00 ACUTE NON-RECURRENT MAXILLARY SINUSITIS: Primary | ICD-10-CM

## 2019-12-23 DIAGNOSIS — Z91.09 ENVIRONMENTAL ALLERGIES: ICD-10-CM

## 2019-12-23 PROCEDURE — 99213 OFFICE O/P EST LOW 20 MIN: CPT | Performed by: FAMILY MEDICINE

## 2019-12-23 PROCEDURE — 3008F BODY MASS INDEX DOCD: CPT | Performed by: FAMILY MEDICINE

## 2019-12-23 RX ORDER — METHYLPREDNISOLONE 4 MG/1
TABLET ORAL
Qty: 21 EACH | Refills: 0 | Status: SHIPPED | OUTPATIENT
Start: 2019-12-23 | End: 2020-08-13 | Stop reason: ALTCHOICE

## 2019-12-23 RX ORDER — AMOXICILLIN 500 MG/1
500 TABLET, FILM COATED ORAL 3 TIMES DAILY
Qty: 30 TABLET | Refills: 0 | Status: SHIPPED | OUTPATIENT
Start: 2019-12-23 | End: 2020-01-02

## 2019-12-23 RX ORDER — FLUTICASONE PROPIONATE 50 MCG
1 SPRAY, SUSPENSION (ML) NASAL DAILY
Qty: 1 BOTTLE | Refills: 0 | Status: SHIPPED | OUTPATIENT
Start: 2019-12-23 | End: 2020-08-13 | Stop reason: ALTCHOICE

## 2019-12-23 NOTE — PATIENT INSTRUCTIONS
Sinusitis   AMBULATORY CARE:   Sinusitis  is inflammation or infection of your sinuses  It is most often caused by a virus  Acute sinusitis may last up to 12 weeks  Chronic sinusitis lasts longer than 12 weeks  Recurrent sinusitis means you have 4 or more times in 1 year  Common symptoms include the following:   · Fever    · Pain, pressure, redness, or swelling around the forehead, cheeks, or eyes    · Thick yellow or green discharge from your nose    · Tenderness when you touch your face over your sinuses    · Dry cough that happens mostly at night or when you lie down    · Headache and face pain that is worse when you lean forward    · Tooth pain, or pain when you chew  Seek care immediately if:   · Your eye and eyelid are red, swollen, and painful  · You cannot open your eye  · You have vision changes, such as double vision  · Your eyeball bulges out or you cannot move your eye  · You are more sleepy than normal, or you notice changes in your ability to think, move, or talk  · You have a stiff neck, a fever, or a bad headache  · You have swelling of your forehead or scalp  Contact your healthcare provider if:   · Your symptoms do not improve after 3 days  · Your symptoms do not go away after 10 days  · You have nausea and are vomiting  · Your nose is bleeding  · You have questions or concerns about your condition or care  Treatment for sinusitis:  Your symptoms may go away on their own  Your healthcare provider may recommend watchful waiting for up to 10 days before starting antibiotics  You may  need any of the following:  · Acetaminophen  decreases pain and fever  It is available without a doctor's order  Ask how much to take and how often to take it  Follow directions  Read the labels of all other medicines you are using to see if they also contain acetaminophen, or ask your doctor or pharmacist  Acetaminophen can cause liver damage if not taken correctly   Do not use more than 4 grams (4,000 milligrams) total of acetaminophen in one day  · NSAIDs , such as ibuprofen, help decrease swelling, pain, and fever  This medicine is available with or without a doctor's order  NSAIDs can cause stomach bleeding or kidney problems in certain people  If you take blood thinner medicine, always ask your healthcare provider if NSAIDs are safe for you  Always read the medicine label and follow directions  · Nasal steroid sprays  may help decrease inflammation in your nose and sinuses  · Decongestants  help reduce swelling and drain mucus in the nose and sinuses  They may help you breathe easier  · Antihistamines  help dry mucus in the nose and relieve sneezing  · Antibiotics  help treat or prevent a bacterial infection  · Take your medicine as directed  Contact your healthcare provider if you think your medicine is not helping or if you have side effects  Tell him or her if you are allergic to any medicine  Keep a list of the medicines, vitamins, and herbs you take  Include the amounts, and when and why you take them  Bring the list or the pill bottles to follow-up visits  Carry your medicine list with you in case of an emergency  Self-care:   · Rinse your sinuses  Use a sinus rinse device to rinse your nasal passages with a saline (salt water) solution or distilled water  Do not use tap water  This will help thin the mucus in your nose and rinse away pollen and dirt  It will also help reduce swelling so you can breathe normally  Ask your healthcare provider how often to do this  · Breathe in steam   Heat a bowl of water until you see steam  Lean over the bowl and make a tent over your head with a large towel  Breathe deeply for about 20 minutes  Be careful not to get too close to the steam or burn yourself  Do this 3 times a day  You can also breathe deeply when you take a hot shower  · Sleep with your head elevated    Place an extra pillow under your head before you go to sleep to help your sinuses drain  · Drink liquids as directed  Ask your healthcare provider how much liquid to drink each day and which liquids are best for you  Liquids will thin the mucus in your nose and help it drain  Avoid drinks that contain alcohol or caffeine  · Do not smoke, and avoid secondhand smoke  Nicotine and other chemicals in cigarettes and cigars can make your symptoms worse  Ask your healthcare provider for information if you currently smoke and need help to quit  E-cigarettes or smokeless tobacco still contain nicotine  Talk to your healthcare provider before you use these products  Prevent the spread of germs that cause sinusitis:  Wash your hands often with soap and water  Wash your hands after you use the bathroom, change a child's diaper, or sneeze  Wash your hands before you prepare or eat food  Follow up with your healthcare provider as directed: You may be referred to an ear, nose, and throat specialist  Write down your questions so you remember to ask them during your visits  © 2017 [facility] Information is for End User's use only and may not be sold, redistributed or otherwise used for commercial purposes  All illustrations and images included in CareNotes® are the copyrighted property of [employer] , [employer]  or [employer]  The above information is an  only  It is not intended as medical advice for individual conditions or treatments  Talk to your doctor, nurse or pharmacist before following any medical regimen to see if it is safe and effective for you

## 2019-12-23 NOTE — PROGRESS NOTES
Cristian Collado 1975 female MRN: [id number]    Acute Visit        ASSESSMENT/PLAN  Problem List Items Addressed This Visit        Respiratory    Acute non-recurrent maxillary sinusitis - Primary    Relevant Medications    amoxicillin (AMOXIL) 500 MG tablet    methylPREDNISolone 4 MG tablet therapy pack    fluticasone (FLONASE) 50 mcg/act nasal spray      Other Visit Diagnoses     Environmental allergies        Relevant Medications    fluticasone (FLONASE) 50 mcg/act nasal spray    Other Relevant Orders    Ambulatory referral to Allergy                No future appointments  SUBJECTIVE  CC: Sinusitis       45year old here for sinus congestion, pressure  She was seen here on 11/25 for similar symptoms  Was on Medrol and felt better temporarily  She took Mucinex D     Cristian Collado is a 45 y o  female who presented for an acute visit complaining of  Review of Systems   Constitutional: Positive for fatigue  Negative for fever  HENT: Positive for congestion, sinus pressure and sinus pain  Negative for sneezing and sore throat  Respiratory: Negative for cough  Allergic/Immunologic: Positive for environmental allergies  Historical Information   The patient history was reviewed as follows:  Past Medical History:   Diagnosis Date    Acid reflux disease     Allergic rhinitis, seasonal     Asthma     Hiatal hernia     Migraine     Ulcer of gastroesophageal junction      Past Surgical History:   Procedure Laterality Date    LA EGD TRANSORAL BIOPSY SINGLE/MULTIPLE N/A 2/5/2019    Procedure: ESOPHAGOGASTRODUODENOSCOPY (EGD); Surgeon: Yamila Dubon DO;  Location: [facility];   Service: Gastroenterology    WISDOM TOOTH EXTRACTION       Family History   Problem Relation Age of Onset    Anxiety disorder Mother     Hyperlipidemia Mother     Anxiety disorder Sister     No Known Problems Brother     Anxiety disorder Daughter     Asthma Son     Diabetes Maternal Grandmother     No Known Problems Maternal Grandfather     No Known Problems Paternal Grandmother     No Known Problems Paternal Grandfather     No Known Problems Son     No Known Problems Brother     Anxiety disorder Brother     No Known Problems Brother       Social History   Social History     Substance and Sexual Activity   Alcohol Use Yes    Comment: social     Social History     Substance and Sexual Activity   Drug Use No     Social History     Tobacco Use   Smoking Status Never Smoker   Smokeless Tobacco Never Used       Medications:   Meds/Allergies   Current Outpatient Medications   Medication Sig Dispense Refill    acetaminophen (TYLENOL) 500 mg tablet Take 500 mg by mouth every 4 (four) hours as needed for mild pain (daily)      pantoprazole (PROTONIX) 40 mg tablet Take 1 tablet (40 mg total) by mouth daily 90 tablet 1    Dwzupa-XfUcx-CmIdt-Meth-FA-DHA (PRENATE MINI) 18-0 6-0 4-350 MG CAPS Take 1 tablet by mouth daily for 28 days 28 capsule 6    amoxicillin (AMOXIL) 500 MG tablet Take 1 tablet (500 mg total) by mouth 3 (three) times a day for 10 days 30 tablet 0    fluticasone (FLONASE) 50 mcg/act nasal spray 1 spray into each nostril daily 1 Bottle 0    methylPREDNISolone 4 MG tablet therapy pack Use as directed on package 21 each 0     No current facility-administered medications for this visit  No Known Allergies    OBJECTIVE  Vitals:   Vitals:    12/23/19 1554   BP: 108/62   Pulse: 76   Temp: 98 9 °F (37 2 °C)   SpO2: 98%   Weight: 87 1 kg (192 lb)   Height: 5' 1 5" (1 562 m)       Invasive Devices     None                 Physical Exam   Constitutional: She is oriented to person, place, and time  She appears well-developed and well-nourished  No distress  HENT:   Right Ear: Hearing, tympanic membrane, external ear and ear canal normal    Left Ear: Hearing, tympanic membrane, external ear and ear canal normal    Nose: Right sinus exhibits maxillary sinus tenderness  Left sinus exhibits maxillary sinus tenderness  Mouth/Throat: Uvula is midline  No oropharyngeal exudate or posterior oropharyngeal erythema  Eyes: Pupils are equal, round, and reactive to light  Conjunctivae are normal    Cardiovascular: Normal rate and normal heart sounds  Exam reveals no gallop and no friction rub  No murmur heard  Pulmonary/Chest: Effort normal and breath sounds normal  No respiratory distress  She has no wheezes  She has no rales  Lymphadenopathy:     She has no cervical adenopathy  Neurological: She is alert and oriented to person, place, and time  Skin: Skin is warm  No rash noted  Psychiatric: She has a normal mood and affect  Her behavior is normal  Judgment and thought content normal    Nursing note and vitals reviewed  Lab:  I have personally reviewed all pertinent results  BMI Counseling: Body mass index is 35 69 kg/m²  The BMI is above normal  Nutrition recommendations include reducing portion sizes, decreasing overall calorie intake and 3-5 servings of fruits/vegetables daily  Exercise recommendations include exercising 3-5 times per week

## 2020-02-05 ENCOUNTER — TELEPHONE (OUTPATIENT)
Dept: OBGYN CLINIC | Facility: CLINIC | Age: 45
End: 2020-02-05

## 2020-02-10 ENCOUNTER — TELEPHONE (OUTPATIENT)
Dept: OBGYN CLINIC | Facility: CLINIC | Age: 45
End: 2020-02-10

## 2020-02-10 DIAGNOSIS — Z34.90 PREGNANCY, UNSPECIFIED GESTATIONAL AGE: Primary | ICD-10-CM

## 2020-02-10 NOTE — TELEPHONE ENCOUNTER
39 yr old,   G6 ps, 5 1/2 weeks   Pt began bleeding 5 days ago - heavy as a period  Had pos preg test 1 week ago and today  No other signs of pregnancy now  Quants today and in 48 hrs?   Does not know her blood type

## 2020-02-10 NOTE — TELEPHONE ENCOUNTER
Patient called stated she started her period 2/5/20  Patient is scheduled for an early ultrasound on 03/03/20  She took another HPT on 2/8/20 and it showed positive   Please advise

## 2020-02-12 ENCOUNTER — APPOINTMENT (OUTPATIENT)
Dept: LAB | Facility: MEDICAL CENTER | Age: 45
End: 2020-02-12
Payer: COMMERCIAL

## 2020-02-12 DIAGNOSIS — Z34.90 PREGNANCY, UNSPECIFIED GESTATIONAL AGE: ICD-10-CM

## 2020-02-12 LAB — B-HCG SERPL-ACNC: 884 MIU/ML

## 2020-02-12 PROCEDURE — 36415 COLL VENOUS BLD VENIPUNCTURE: CPT

## 2020-02-12 PROCEDURE — 84702 CHORIONIC GONADOTROPIN TEST: CPT

## 2020-02-14 ENCOUNTER — TELEPHONE (OUTPATIENT)
Dept: OBGYN CLINIC | Facility: CLINIC | Age: 45
End: 2020-02-14

## 2020-02-14 ENCOUNTER — APPOINTMENT (OUTPATIENT)
Dept: LAB | Facility: MEDICAL CENTER | Age: 45
End: 2020-02-14
Payer: COMMERCIAL

## 2020-02-14 DIAGNOSIS — Z34.90 PREGNANCY, UNSPECIFIED GESTATIONAL AGE: ICD-10-CM

## 2020-02-14 LAB — B-HCG SERPL-ACNC: 911 MIU/ML

## 2020-02-14 PROCEDURE — 84702 CHORIONIC GONADOTROPIN TEST: CPT

## 2020-02-14 PROCEDURE — 36415 COLL VENOUS BLD VENIPUNCTURE: CPT

## 2020-02-14 NOTE — TELEPHONE ENCOUNTER
Pt called back-explained to pt in detail the importance of reporting to ER with any pelvic pain, or if she has heavy vag blding  States "at this time she does not have pain & is just having spotting "  Advised to monitor for now, if no pain, keep Monday 2/17/ appt with ethan

## 2020-02-14 NOTE — TELEPHONE ENCOUNTER
Called pt- , per communication consent, L/M informing pt of dr Shaikh's recommendations, & advised pt to report to ER with any pain or if pain develops over the weekend  If not, reminded to keep appt Monday 2/17  With Lety San

## 2020-02-14 NOTE — TELEPHONE ENCOUNTER
----- Message from Madison Orozco MD sent at 2/14/2020 12:40 PM EST -----  Patient did not have appropriate rise in hcg  Please call to enquire whether she is having any pain  If so, she should go to the ER and you should let the doctor on call know  If not, she should keep her appointment for 2/17 with Brad Wall  If she has pain over the weekend, she should go to the ER        Thanks!!

## 2020-02-16 PROBLEM — O02.81 INAPPROPRIATE CHANGE IN QUANTITATIVE HCG IN EARLY PREGNANCY: Status: ACTIVE | Noted: 2020-02-16

## 2020-02-17 ENCOUNTER — TELEPHONE (OUTPATIENT)
Dept: OBGYN CLINIC | Facility: CLINIC | Age: 45
End: 2020-02-17

## 2020-02-17 ENCOUNTER — APPOINTMENT (OUTPATIENT)
Dept: LAB | Facility: HOSPITAL | Age: 45
End: 2020-02-17
Payer: COMMERCIAL

## 2020-02-17 ENCOUNTER — HOSPITAL ENCOUNTER (OUTPATIENT)
Dept: RADIOLOGY | Facility: MEDICAL CENTER | Age: 45
Discharge: HOME | End: 2020-02-17
Payer: COMMERCIAL

## 2020-02-17 ENCOUNTER — OFFICE VISIT (OUTPATIENT)
Dept: OBGYN CLINIC | Facility: MEDICAL CENTER | Age: 45
End: 2020-02-17
Payer: COMMERCIAL

## 2020-02-17 VITALS — DIASTOLIC BLOOD PRESSURE: 70 MMHG | SYSTOLIC BLOOD PRESSURE: 104 MMHG

## 2020-02-17 DIAGNOSIS — O02.81 INAPPROPRIATE CHANGE IN QUANTITATIVE HCG IN EARLY PREGNANCY: Primary | ICD-10-CM

## 2020-02-17 DIAGNOSIS — O02.81 INAPPROPRIATE CHANGE IN QUANTITATIVE HCG IN EARLY PREGNANCY: ICD-10-CM

## 2020-02-17 LAB
ALBUMIN SERPL BCP-MCNC: 3.5 G/DL (ref 3.5–5)
ALP SERPL-CCNC: 73 U/L (ref 46–116)
ALT SERPL W P-5'-P-CCNC: 24 U/L (ref 12–78)
ANION GAP SERPL CALCULATED.3IONS-SCNC: 7 MMOL/L (ref 4–13)
AST SERPL W P-5'-P-CCNC: 15 U/L (ref 5–45)
B-HCG SERPL-ACNC: 797 MIU/ML
BASOPHILS # BLD AUTO: 0.06 THOUSANDS/ΜL (ref 0–0.1)
BASOPHILS NFR BLD AUTO: 1 % (ref 0–1)
BILIRUB SERPL-MCNC: 0.6 MG/DL (ref 0.2–1)
BUN SERPL-MCNC: 11 MG/DL (ref 5–25)
CALCIUM SERPL-MCNC: 8.6 MG/DL (ref 8.3–10.1)
CHLORIDE SERPL-SCNC: 106 MMOL/L (ref 100–108)
CO2 SERPL-SCNC: 27 MMOL/L (ref 21–32)
CREAT SERPL-MCNC: 0.76 MG/DL (ref 0.6–1.3)
EOSINOPHIL # BLD AUTO: 0.53 THOUSAND/ΜL (ref 0–0.61)
EOSINOPHIL NFR BLD AUTO: 10 % (ref 0–6)
ERYTHROCYTE [DISTWIDTH] IN BLOOD BY AUTOMATED COUNT: 13.2 % (ref 11.6–15.1)
GFR SERPL CREATININE-BSD FRML MDRD: 97 ML/MIN/1.73SQ M
GLUCOSE P FAST SERPL-MCNC: 89 MG/DL (ref 65–99)
HCT VFR BLD AUTO: 43.9 % (ref 34.8–46.1)
HGB BLD-MCNC: 13.7 G/DL (ref 11.5–15.4)
IMM GRANULOCYTES # BLD AUTO: 0.02 THOUSAND/UL (ref 0–0.2)
IMM GRANULOCYTES NFR BLD AUTO: 0 % (ref 0–2)
LYMPHOCYTES # BLD AUTO: 1.58 THOUSANDS/ΜL (ref 0.6–4.47)
LYMPHOCYTES NFR BLD AUTO: 29 % (ref 14–44)
MCH RBC QN AUTO: 29.2 PG (ref 26.8–34.3)
MCHC RBC AUTO-ENTMCNC: 31.2 G/DL (ref 31.4–37.4)
MCV RBC AUTO: 94 FL (ref 82–98)
MONOCYTES # BLD AUTO: 0.31 THOUSAND/ΜL (ref 0.17–1.22)
MONOCYTES NFR BLD AUTO: 6 % (ref 4–12)
NEUTROPHILS # BLD AUTO: 3 THOUSANDS/ΜL (ref 1.85–7.62)
NEUTS SEG NFR BLD AUTO: 54 % (ref 43–75)
NRBC BLD AUTO-RTO: 0 /100 WBCS
PLATELET # BLD AUTO: 224 THOUSANDS/UL (ref 149–390)
PMV BLD AUTO: 9.4 FL (ref 8.9–12.7)
POTASSIUM SERPL-SCNC: 4.2 MMOL/L (ref 3.5–5.3)
PROT SERPL-MCNC: 7.3 G/DL (ref 6.4–8.2)
RBC # BLD AUTO: 4.69 MILLION/UL (ref 3.81–5.12)
SODIUM SERPL-SCNC: 140 MMOL/L (ref 136–145)
WBC # BLD AUTO: 5.5 THOUSAND/UL (ref 4.31–10.16)

## 2020-02-17 PROCEDURE — 76815 OB US LIMITED FETUS(S): CPT

## 2020-02-17 PROCEDURE — 80053 COMPREHEN METABOLIC PANEL: CPT

## 2020-02-17 PROCEDURE — 36415 COLL VENOUS BLD VENIPUNCTURE: CPT

## 2020-02-17 PROCEDURE — 85025 COMPLETE CBC W/AUTO DIFF WBC: CPT

## 2020-02-17 PROCEDURE — 84702 CHORIONIC GONADOTROPIN TEST: CPT

## 2020-02-17 PROCEDURE — 1036F TOBACCO NON-USER: CPT | Performed by: NURSE PRACTITIONER

## 2020-02-17 PROCEDURE — 99213 OFFICE O/P EST LOW 20 MIN: CPT | Performed by: NURSE PRACTITIONER

## 2020-02-17 NOTE — TELEPHONE ENCOUNTER
Patient aware of dropping quant and normal CBC, Chem  U/S shows fluid in EDMAR -ovaries are normal   Will repeat quant 2 day  Ectopic precautions reviewed  Aware to go to ED with bleeding or pain

## 2020-02-17 NOTE — TELEPHONE ENCOUNTER
Dear Codi Us:    Pt called office today requesting to discuss with you recent lab work results she saw while viewing "MyChart"  Pt states she can be reached by telephone @ [phone]  *Pt is aware that she will receive a response from you upon your return to the office if not by this evening  Thank you!     Karrie Barros MA

## 2020-02-17 NOTE — PROGRESS NOTES
Assessment/Plan:    Inappropriate change in quantitative hCG in early pregnancy  Order placed for stat quant, CBC, CMP and ultrasound to R/O ectopic  Will have all today  Discussed PUL and possible need for MTX vs surgery if ectopic is suspected  Multiple questions answered  Aware to call if has bleeding or pain or go to ED  Diagnoses and all orders for this visit:    Inappropriate change in quantitative hCG in early pregnancy  -     US pelvis complete w transvaginal; Future  -     hCG, quantitative; Future  -     CBC and differential; Future  -     Comprehensive metabolic panel; Future        Subjective:      Patient ID: Checo Joya is a 45 y o  female  Nito Roy is a 45year old who presents with inappropriate rise in BHCG levels  She did a HPT in the beginning of Feb as she was late for menses and had breast tenderness and it was positive but she began to bleed on 2/5 and it lasted until 2/14  She was experiencing constant pelvic pressure that resolved as of yesterday  She had a quant of 884 on 2/12 and repeat on 2/14 was 911  She denies pain or bleeding today  She was reading on the internet and is concerned that she may have an ectopic  Discussed PUL and causes (early SAB, blighted ovum or ectopic)  Denies UTI symptoms  Pregnancy was planned  The following portions of the patient's history were reviewed and updated as appropriate: allergies, current medications, past family history, past medical history, past social history, past surgical history and problem list     Review of Systems   Constitutional: Negative  Gastrointestinal: Negative for abdominal pain  Genitourinary: Negative for difficulty urinating, flank pain, frequency, pelvic pain, urgency, vaginal bleeding, vaginal discharge and vaginal pain  Neurological: Negative  Negative for syncope and light-headedness  Hematological: Negative  Psychiatric/Behavioral: Negative            Objective:      /70 (BP Location: Right arm, Patient Position: Sitting, Cuff Size: Large)   LMP 02/05/2020          Physical Exam   Constitutional: She is oriented to person, place, and time  She appears well-nourished  HENT:   Head: Normocephalic  Eyes: Pupils are equal, round, and reactive to light  Neck: Normal range of motion  Abdominal: She exhibits no distension  There is no tenderness  Genitourinary: Vagina normal  There is no rash, tenderness or lesion on the right labia  There is no rash, tenderness or lesion on the left labia  Uterus is not tender  Cervix exhibits no motion tenderness, no discharge and no friability  Right adnexum displays no mass and no tenderness  Left adnexum displays no mass and no tenderness  Musculoskeletal: Normal range of motion  Neurological: She is alert and oriented to person, place, and time  Skin: Skin is warm and dry  Capillary refill takes less than 2 seconds  Psychiatric: She has a normal mood and affect

## 2020-02-17 NOTE — ASSESSMENT & PLAN NOTE
Order placed for stat quant, CBC, CMP and ultrasound to R/O ectopic  Will have all today  Discussed PUL and possible need for MTX vs surgery if ectopic is suspected  Multiple questions answered  Aware to call if has bleeding or pain or go to ED

## 2020-02-19 ENCOUNTER — APPOINTMENT (OUTPATIENT)
Dept: LAB | Facility: MEDICAL CENTER | Age: 45
End: 2020-02-19
Payer: COMMERCIAL

## 2020-02-19 ENCOUNTER — TELEPHONE (OUTPATIENT)
Dept: OBGYN CLINIC | Facility: CLINIC | Age: 45
End: 2020-02-19

## 2020-02-19 DIAGNOSIS — O02.81 INAPPROPRIATE CHANGE IN QUANTITATIVE HCG IN EARLY PREGNANCY: ICD-10-CM

## 2020-02-19 LAB — B-HCG SERPL-ACNC: 790 MIU/ML

## 2020-02-19 PROCEDURE — 84702 CHORIONIC GONADOTROPIN TEST: CPT

## 2020-02-19 PROCEDURE — 36415 COLL VENOUS BLD VENIPUNCTURE: CPT

## 2020-02-19 NOTE — TELEPHONE ENCOUNTER
Zaria aware of quant with plateau of BHCG  Reviewed with Dr Lerma Session repeat on Friday with MD visit on Monday to discuss options  Denies pain or bleeding  Ectopic precautions reviewed

## 2020-02-21 ENCOUNTER — TELEPHONE (OUTPATIENT)
Dept: OBGYN CLINIC | Facility: CLINIC | Age: 45
End: 2020-02-21

## 2020-02-21 ENCOUNTER — APPOINTMENT (OUTPATIENT)
Dept: LAB | Facility: MEDICAL CENTER | Age: 45
End: 2020-02-21
Payer: COMMERCIAL

## 2020-02-21 DIAGNOSIS — N91.2 AMENORRHEA: ICD-10-CM

## 2020-02-21 DIAGNOSIS — N91.2 AMENORRHEA: Primary | ICD-10-CM

## 2020-02-21 LAB — B-HCG SERPL-ACNC: 806 MIU/ML

## 2020-02-21 PROCEDURE — 84702 CHORIONIC GONADOTROPIN TEST: CPT

## 2020-02-21 PROCEDURE — 36415 COLL VENOUS BLD VENIPUNCTURE: CPT

## 2020-02-21 NOTE — TELEPHONE ENCOUNTER
Order placed in [facility] for [facility] following call from Bebe Szymanski in [place]

## 2020-02-24 ENCOUNTER — TELEPHONE (OUTPATIENT)
Dept: OBGYN CLINIC | Facility: MEDICAL CENTER | Age: 45
End: 2020-02-24

## 2020-02-24 ENCOUNTER — TELEPHONE (OUTPATIENT)
Dept: OBGYN CLINIC | Facility: CLINIC | Age: 45
End: 2020-02-24

## 2020-02-24 ENCOUNTER — APPOINTMENT (OUTPATIENT)
Dept: LAB | Facility: MEDICAL CENTER | Age: 45
End: 2020-02-24
Payer: COMMERCIAL

## 2020-02-24 DIAGNOSIS — N91.2 AMENORRHEA: Primary | ICD-10-CM

## 2020-02-24 DIAGNOSIS — N91.2 AMENORRHEA: ICD-10-CM

## 2020-02-24 LAB — B-HCG SERPL-ACNC: 417 MIU/ML

## 2020-02-24 PROCEDURE — 84702 CHORIONIC GONADOTROPIN TEST: CPT

## 2020-02-24 PROCEDURE — 36415 COLL VENOUS BLD VENIPUNCTURE: CPT

## 2020-02-24 NOTE — TELEPHONE ENCOUNTER
Please call her and let her know it is vital that she keep tomorrow's appointment as her Rhea Robertson continues to go up  She should have a repeat today  Order entered

## 2020-02-24 NOTE — TELEPHONE ENCOUNTER
Per QUITA Ervin, pt was called & informed that she will need to have quant drawn at [facility] lab today, also,it is  imperative that she keeps office appt tomorrow  Advised pt to report to [facility] ER if she has any vag blding, or pelvic pain  Pt verbalized understanding  Will go for bld work at [place] [id number] today & report for appt tomorrow

## 2020-02-25 ENCOUNTER — OFFICE VISIT (OUTPATIENT)
Dept: OBGYN CLINIC | Facility: CLINIC | Age: 45
End: 2020-02-25
Payer: COMMERCIAL

## 2020-02-25 VITALS
HEIGHT: 62 IN | DIASTOLIC BLOOD PRESSURE: 84 MMHG | SYSTOLIC BLOOD PRESSURE: 132 MMHG | BODY MASS INDEX: 36.14 KG/M2 | WEIGHT: 196.4 LBS

## 2020-02-25 DIAGNOSIS — E34.9 ELEVATED SERUM HUMAN CHORIONIC GONADOTROPIN (HCG) LEVEL: Primary | ICD-10-CM

## 2020-02-25 PROBLEM — O36.80X0 PREGNANCY OF UNKNOWN ANATOMIC LOCATION: Status: ACTIVE | Noted: 2020-02-25

## 2020-02-25 PROCEDURE — 99213 OFFICE O/P EST LOW 20 MIN: CPT | Performed by: STUDENT IN AN ORGANIZED HEALTH CARE EDUCATION/TRAINING PROGRAM

## 2020-02-25 PROCEDURE — 1036F TOBACCO NON-USER: CPT | Performed by: STUDENT IN AN ORGANIZED HEALTH CARE EDUCATION/TRAINING PROGRAM

## 2020-02-25 NOTE — ASSESSMENT & PLAN NOTE
40 yo [id number] with pregnancy of unknown anatomic location here for discussion of further management  Hcg trend   884   911   797   790   806   417    20  IMPRESSION:  Small ovoid fluid collection in the lower uterine body endometrial canal   No yolk sac or embryo to confirm an IUP  Differential remains early IUP, spontaneous  (favored), and ectopic pregnancy  Correlate with serial quantitative BHCG  Extensively reviewed with patient the situation  Known abnormal pregnancy but differential still includes failed IUP, failing ectopic or GTD  Discussed given large drop over the last two days I am hopeful this indicates her body is resolving an abnormal IUP or ectopic on its own and will not require further surgery or medication  We will plan to repeat hcg tomorrow  If continues to fall will change to weekly and follow to non pregnant level  If plateau or increase tomorrow or during monitoring will recommend dilation and curettage  We signed consent for D&C today should that be needed down the road to expedite surgery scheduling  We did briefly review if D&C needed possibilities are pregnancy tissue, no pregnancy tissue meaning she may require additional surgery or MTX or molar tissue  At the end of our visit the patient expressed understanding the immediate plan and the directions pending labs tomorrow  We will continue to address as we gain information  We reviewed ectopic/return precautions for heavy bleeding or severe abdominal pain

## 2020-02-25 NOTE — PROGRESS NOTES
Assessment/Plan:   Pregnancy of unknown anatomic location  40 yo [id number] with pregnancy of unknown anatomic location here for discussion of further management  Hcg trend   884   911   797   790   806   417    20  IMPRESSION:  Small ovoid fluid collection in the lower uterine body endometrial canal   No yolk sac or embryo to confirm an IUP  Differential remains early IUP, spontaneous  (favored), and ectopic pregnancy  Correlate with serial quantitative BHCG  Extensively reviewed with patient the situation  Known abnormal pregnancy but differential still includes failed IUP, failing ectopic or GTD  Discussed given large drop over the last two days I am hopeful this indicates her body is resolving an abnormal IUP or ectopic on its own and will not require further surgery or medication  We will plan to repeat hcg tomorrow  If continues to fall will change to weekly and follow to non pregnant level  If plateau or increase tomorrow or during monitoring will recommend dilation and curettage  We signed consent for D&C today should that be needed down the road to expedite surgery scheduling  We did briefly review if D&C needed possibilities are pregnancy tissue, no pregnancy tissue meaning she may require additional surgery or MTX or molar tissue  At the end of our visit the patient expressed understanding the immediate plan and the directions pending labs tomorrow  We will continue to address as we gain information  We reviewed ectopic/return precautions for heavy bleeding or severe abdominal pain  Diagnoses and all orders for this visit:    Elevated serum human chorionic gonadotropin (hCG) level  -     hCG, quantitative; Future          Subjective:     Patient ID: Jh Landeros is a 39 y o  female  40 yo [id number] here for follow up of abnormal pregnancy  She reports some minimal bleeding and menstrual cramping yesterday and today  No other complaints         Review of Systems   Constitutional: Negative for chills and fever  Respiratory: Negative for shortness of breath  Cardiovascular: Negative for chest pain  Gastrointestinal: Negative for abdominal pain, constipation, diarrhea and nausea  Genitourinary: Negative for dyspareunia, dysuria, frequency, urgency, vaginal bleeding, vaginal discharge and vaginal pain  Objective:     Physical Exam   Constitutional: She is oriented to person, place, and time  She appears well-developed and well-nourished  HENT:   Head: Normocephalic and atraumatic  Eyes: EOM are normal    Neck: Normal range of motion  Cardiovascular: Normal rate  Pulmonary/Chest: Effort normal  No respiratory distress  Abdominal: Soft  She exhibits no distension and no mass  There is no tenderness  There is no rebound and no guarding  Genitourinary:   Genitourinary Comments: deferred   Neurological: She is alert and oriented to person, place, and time  Skin: Skin is warm and dry  Psychiatric: She has a normal mood and affect   Her behavior is normal  Judgment and thought content normal

## 2020-02-26 ENCOUNTER — APPOINTMENT (OUTPATIENT)
Dept: LAB | Facility: MEDICAL CENTER | Age: 45
End: 2020-02-26
Payer: COMMERCIAL

## 2020-02-26 ENCOUNTER — TELEPHONE (OUTPATIENT)
Dept: LABOR AND DELIVERY | Facility: HOSPITAL | Age: 45
End: 2020-02-26

## 2020-02-26 DIAGNOSIS — O36.80X0 PREGNANCY OF UNKNOWN ANATOMIC LOCATION: Primary | ICD-10-CM

## 2020-02-26 DIAGNOSIS — E34.9 ELEVATED SERUM HUMAN CHORIONIC GONADOTROPIN (HCG) LEVEL: ICD-10-CM

## 2020-02-26 LAB — B-HCG SERPL-ACNC: 98 MIU/ML

## 2020-02-26 PROCEDURE — 36415 COLL VENOUS BLD VENIPUNCTURE: CPT

## 2020-02-26 PROCEDURE — 84702 CHORIONIC GONADOTROPIN TEST: CPT

## 2020-02-26 NOTE — TELEPHONE ENCOUNTER
Called patient to review quant which fell nicely to 98 from 417  Continued down trend indicates her body continues to work to resolve this on its own  Reports slight increase in bleeding today but still less than a period  No pain  We discussed again this is still PUL with ectopic not completely ruled out if increase pain or bleeding needs to call back  Will plan for quant in one week  Follow until non-pregnant level reached  Patient agrees and expressed understanding  Relieved by news!

## 2020-03-04 ENCOUNTER — TELEPHONE (OUTPATIENT)
Dept: LABOR AND DELIVERY | Facility: HOSPITAL | Age: 45
End: 2020-03-04

## 2020-03-04 ENCOUNTER — APPOINTMENT (OUTPATIENT)
Dept: LAB | Facility: MEDICAL CENTER | Age: 45
End: 2020-03-04
Payer: COMMERCIAL

## 2020-03-04 DIAGNOSIS — O36.80X0 PREGNANCY OF UNKNOWN ANATOMIC LOCATION: ICD-10-CM

## 2020-03-04 LAB — B-HCG SERPL-ACNC: 5 MIU/ML

## 2020-03-04 PROCEDURE — 84702 CHORIONIC GONADOTROPIN TEST: CPT

## 2020-03-04 PROCEDURE — 36415 COLL VENOUS BLD VENIPUNCTURE: CPT

## 2020-03-04 NOTE — TELEPHONE ENCOUNTER
Called patient to review hcg non pregnant level at 5  Abnormal pregnancy appears to have resolved on its own  Reviewed ok to try for pregnancy when mentally and physically feeling ready  Reviewed to monitor for next cycle and inform us of any irregular bleeding

## 2020-08-13 ENCOUNTER — OFFICE VISIT (OUTPATIENT)
Dept: FAMILY MEDICINE CLINIC | Facility: CLINIC | Age: 45
End: 2020-08-13
Payer: COMMERCIAL

## 2020-08-13 VITALS
SYSTOLIC BLOOD PRESSURE: 128 MMHG | HEART RATE: 81 BPM | OXYGEN SATURATION: 99 % | BODY MASS INDEX: 35.26 KG/M2 | HEIGHT: 62 IN | DIASTOLIC BLOOD PRESSURE: 82 MMHG | TEMPERATURE: 97.7 F | WEIGHT: 191.6 LBS

## 2020-08-13 DIAGNOSIS — Z12.39 SCREENING FOR BREAST CANCER: ICD-10-CM

## 2020-08-13 DIAGNOSIS — M25.511 ACUTE PAIN OF RIGHT SHOULDER: Primary | ICD-10-CM

## 2020-08-13 PROBLEM — J01.00 ACUTE NON-RECURRENT MAXILLARY SINUSITIS: Status: RESOLVED | Noted: 2019-11-25 | Resolved: 2020-08-13

## 2020-08-13 PROCEDURE — 1036F TOBACCO NON-USER: CPT | Performed by: NURSE PRACTITIONER

## 2020-08-13 PROCEDURE — 99213 OFFICE O/P EST LOW 20 MIN: CPT | Performed by: NURSE PRACTITIONER

## 2020-08-13 NOTE — PROGRESS NOTES
Assessment/Plan:           Problem List Items Addressed This Visit        Other    Screening for breast cancer    Relevant Orders    Mammo screening bilateral w 3d & cad    Acute pain of right shoulder - Primary     Discussed with patient f/u with sports medicine suspect she has a rotator cuff strain          Relevant Orders    Ambulatory referral to Sports Medicine            Subjective:      Patient ID: Stephanie Scott is a 39 y o  female  Patient here and reports that she thinks she pulled shoulder about two months doing lifting and twisting at home and thinking when she lifted a laundry bag  and has pain when reaching over her head or reaching behind her and then was exacerbated last week when she went to hit a fly having increased amounts of pain with movement in her posterior rotator cuff  No prior issues with the shoulder just recently       The following portions of the patient's history were reviewed and updated as appropriate: She  has a past medical history of Acid reflux disease, Allergic rhinitis, seasonal, Asthma, Hiatal hernia, Migraine, and Ulcer of gastroesophageal junction  She   Patient Active Problem List    Diagnosis Date Noted    Acute pain of right shoulder 08/13/2020    Pregnancy of unknown anatomic location 02/25/2020    Inappropriate change in quantitative hCG in early pregnancy 02/16/2020    Encounter for gynecological examination (general) (routine) without abnormal findings 11/22/2019    Screening for breast cancer 11/22/2019    Patient desires pregnancy 11/08/2019    Urine pregnancy test negative 11/08/2019    Missed menses 11/08/2019    Epigastric pain 01/25/2019    Migraine 10/18/2018    Family history of breast cancer 10/05/2018    Gastroesophageal reflux disease without esophagitis 08/31/2018     She  has a past surgical history that includes [place] tooth extraction and pr egd transoral biopsy single/multiple (N/A, 2/5/2019)    Her family history includes Anxiety disorder in her brother, daughter, mother, and sister; Asthma in her son; Diabetes in her maternal grandmother; Hyperlipidemia in her mother; No Known Problems in her brother, brother, brother, maternal grandfather, paternal grandfather, paternal grandmother, and son  She  reports that she has never smoked  She has never used smokeless tobacco  She reports current alcohol use  She reports that she does not use drugs  She has No Known Allergies       Review of Systems   Constitutional: Negative for activity change, appetite change, chills, diaphoresis, fatigue, fever and unexpected weight change  HENT: Negative for congestion, ear pain, hearing loss, postnasal drip, sinus pressure, sinus pain, sneezing and sore throat  Eyes: Negative for pain, redness and visual disturbance  Respiratory: Negative for cough and shortness of breath  Cardiovascular: Negative for chest pain and leg swelling  Gastrointestinal: Negative for abdominal pain, diarrhea, nausea and vomiting  Musculoskeletal: Positive for myalgias  Negative for arthralgias  Neurological: Negative for dizziness and light-headedness  Psychiatric/Behavioral: Negative for behavioral problems and dysphoric mood  Objective:      /82   Pulse 81   Temp 97 7 °F (36 5 °C) (Tympanic)   Ht 5' 2" (1 575 m)   Wt 86 9 kg (191 lb 9 6 oz)   SpO2 99%   BMI 35 04 kg/m²          Physical Exam  Constitutional:       General: She is not in acute distress  Appearance: She is well-developed  HENT:      Head: Normocephalic and atraumatic  Eyes:      Pupils: Pupils are equal, round, and reactive to light  Neck:      Musculoskeletal: Normal range of motion  Thyroid: No thyromegaly  Cardiovascular:      Rate and Rhythm: Normal rate and regular rhythm  Heart sounds: Normal heart sounds  No murmur  Pulmonary:      Effort: Pulmonary effort is normal  No respiratory distress  Breath sounds: Normal breath sounds  No wheezing  Abdominal:      General: Bowel sounds are normal       Palpations: Abdomen is soft  Musculoskeletal:      Right shoulder: She exhibits decreased range of motion, tenderness, pain and spasm  Skin:     General: Skin is warm and dry  Neurological:      Mental Status: She is alert and oriented to person, place, and time

## 2020-08-25 ENCOUNTER — APPOINTMENT (OUTPATIENT)
Dept: RADIOLOGY | Facility: CLINIC | Age: 45
End: 2020-08-25
Payer: COMMERCIAL

## 2020-08-25 ENCOUNTER — OFFICE VISIT (OUTPATIENT)
Dept: OBGYN CLINIC | Facility: CLINIC | Age: 45
End: 2020-08-25
Payer: COMMERCIAL

## 2020-08-25 VITALS
WEIGHT: 189 LBS | BODY MASS INDEX: 34.78 KG/M2 | DIASTOLIC BLOOD PRESSURE: 63 MMHG | TEMPERATURE: 98.9 F | SYSTOLIC BLOOD PRESSURE: 95 MMHG | HEART RATE: 66 BPM | HEIGHT: 62 IN

## 2020-08-25 DIAGNOSIS — M62.838 TRAPEZIUS MUSCLE SPASM: ICD-10-CM

## 2020-08-25 DIAGNOSIS — S46.211A BICEPS STRAIN, RIGHT, INITIAL ENCOUNTER: ICD-10-CM

## 2020-08-25 DIAGNOSIS — S63.501A WRIST SPRAIN, RIGHT, INITIAL ENCOUNTER: ICD-10-CM

## 2020-08-25 DIAGNOSIS — M25.511 ACUTE PAIN OF RIGHT SHOULDER: ICD-10-CM

## 2020-08-25 DIAGNOSIS — M75.41 SUBACROMIAL IMPINGEMENT OF RIGHT SHOULDER: ICD-10-CM

## 2020-08-25 DIAGNOSIS — S46.011A ROTATOR CUFF STRAIN, RIGHT, INITIAL ENCOUNTER: Primary | ICD-10-CM

## 2020-08-25 PROCEDURE — 1036F TOBACCO NON-USER: CPT | Performed by: FAMILY MEDICINE

## 2020-08-25 PROCEDURE — 3008F BODY MASS INDEX DOCD: CPT | Performed by: FAMILY MEDICINE

## 2020-08-25 PROCEDURE — 99243 OFF/OP CNSLTJ NEW/EST LOW 30: CPT | Performed by: FAMILY MEDICINE

## 2020-08-25 PROCEDURE — 73030 X-RAY EXAM OF SHOULDER: CPT

## 2020-08-25 RX ORDER — CYCLOBENZAPRINE HCL 10 MG
10 TABLET ORAL 2 TIMES DAILY PRN
Qty: 60 TABLET | Refills: 1 | Status: SHIPPED | OUTPATIENT
Start: 2020-08-25 | End: 2020-12-04

## 2020-08-25 NOTE — LETTER
August 25, 2020     Korey Lara, [address]  [address] [facility]  [address]    Patient: Verenice Mar   YOB: 1975   Date of Visit: 8/25/2020       Dear Dr Tayo Garcia:    Thank you for referring Verenice Mar to me for evaluation  Below are my notes for this consultation  If you have questions, please do not hesitate to call me  I look forward to following your patient along with you  Sincerely,        [employer], DO        CC: No Recipients  [employer], DO  8/25/2020  5:20 PM  Sign when Signing Visit  Assessment/Plan:  Assessment/Plan   Diagnoses and all orders for this visit:    Rotator cuff strain, right, initial encounter  -     Ambulatory referral to Sports Medicine  -     XR shoulder 2+ vw right; Future  -     Ambulatory referral to Physical Therapy; Future  -     diclofenac sodium (VOLTAREN) 1 %; Apply 2 g topically 4 (four) times a day    Biceps strain, right, initial encounter  -     Ambulatory referral to Physical Therapy; Future  -     diclofenac sodium (VOLTAREN) 1 %; Apply 2 g topically 4 (four) times a day    Subacromial impingement of right shoulder  -     Ambulatory referral to Physical Therapy; Future    Trapezius muscle spasm  -     Ambulatory referral to Physical Therapy; Future  -     cyclobenzaprine (FLEXERIL) 10 mg tablet; Take 1 tablet (10 mg total) by mouth 2 (two) times a day as needed for muscle spasms    Wrist sprain, right, initial encounter  -     Ambulatory referral to Physical Therapy; Future      53-year-old right-hand-dominant female right shoulder pain of more than 1 month duration  Discussed with patient physical exam, radiographs, impression and plan  X-rays of the right shoulder are unremarkable for osseous abnormality  Cervical spine is noted for bilateral paraspinal tenderness  She has normal range of motion  There is positive axial load negative Spurling's    Right shoulder noted for tenderness at trapezius, medial periscapular border, lateral subacromial aspect, and biceps tendon  She has range of motion limited to forward flexion of 160°, abduction 120°, and internal rotation lumbar spine  She has 4+/5 strength external rotation and supraspinatus  There is pain with empty can testing and positive [place] maneuver  She has normal sensation, radial pulse, and biceps reflex in both upper extremities  Clinical impression that she may be symptomatic from strain of rotator cuff and biceps, with trapezius spasm  I discussed treatment regimen of topical anti-inflammatory due to history of gastric ulcer, supplements, physical therapy, muscle relaxers  Supply Voltaren gel topically as needed  She is to start taking tumeric 500 mg twice daily and tart cherry 1000 mg daily, and may take cyclobenzaprine 10 mg at night and may titrate to 2 times a day but not before driving  She is to start physical therapy as soon as possible and do home exercises as directed  She may also benefit from electrical stimulation so I will refer for home electrical stimulation unit  She will follow up me in 6 weeks at which point she will be re-evaluated  Subjective:   Patient ID: Marisela Guevara is a 39 y o  female  Chief Complaint   Patient presents with    Right Shoulder - Pain       53-year-old right-hand-dominant female presents for evaluation of right shoulder pain of more than 1 month duration  Denies any particular trauma or inciting event, but reports repetitive stress of such as carrying laundry, lifting her dogs and children, and moving the lawn  She had pain described as generalized to the shoulder worse at the anterior and posterior aspects, gradual in onset, achy and throbbing, radiating distally to the upper arm, worse with elevating the arm above shoulder level and reaching behind the back, and improved resting and return to neutral position  She has been taking Tylenol to help with pain without any improvement      Shoulder Pain This is a new problem  The current episode started more than 1 month ago  The problem occurs daily  The problem has been unchanged  Associated symptoms include arthralgias  Pertinent negatives include no abdominal pain, chest pain, chills, fever, joint swelling, numbness, rash, sore throat or weakness  Exacerbated by: Arm movement  She has tried rest, acetaminophen and position changes for the symptoms  The treatment provided mild relief  The following portions of the patient's history were reviewed and updated as appropriate: She  has a past medical history of Acid reflux disease, Allergic rhinitis, seasonal, Asthma, Hiatal hernia, Migraine, and Ulcer of gastroesophageal junction  She  has a past surgical history that includes [place] tooth extraction and pr egd transoral biopsy single/multiple (N/A, 2/5/2019)  Her family history includes Anxiety disorder in her brother, daughter, mother, and sister; Asthma in her son; Diabetes in her maternal grandmother; Hyperlipidemia in her mother; No Known Problems in her brother, brother, brother, maternal grandfather, paternal grandfather, paternal grandmother, and son  She  reports that she has never smoked  She has never used smokeless tobacco  She reports current alcohol use  She reports that she does not use drugs  She has No Known Allergies       Review of Systems   Constitutional: Negative for chills and fever  HENT: Negative for sore throat  Eyes: Negative for visual disturbance  Respiratory: Negative for shortness of breath  Cardiovascular: Negative for chest pain  Gastrointestinal: Negative for abdominal pain  Genitourinary: Negative for flank pain  Musculoskeletal: Positive for arthralgias  Negative for joint swelling  Skin: Negative for rash and wound  Neurological: Negative for weakness and numbness  Hematological: Does not bruise/bleed easily  Psychiatric/Behavioral: Negative for self-injury         Objective:  Vitals: 08/25/20 1200   BP: 95/63   Pulse: 66   Temp: 98 9 °F (37 2 °C)   Weight: 85 7 kg (189 lb)   Height: 5' 2" (1 575 m)     Back Exam     Reflexes   Biceps: normal    Comments:    Cervical spine  -plan paraspinal tenderness  -normal range home high  -positive axial load  -negative Spurling's      Right Hand Exam     Muscle Strength   The patient has normal right wrist strength  Other   Sensation: normal  Pulse: present      Left Hand Exam     Muscle Strength   The patient has normal left wrist strength  Other   Sensation: normal  Pulse: present      Right Elbow Exam     Range of Motion   The patient has normal right elbow ROM  Muscle Strength   The patient has normal right elbow strength  Other   Sensation: normal      Left Elbow Exam     Other   Sensation: normal      Right Shoulder Exam     Tenderness   The patient is experiencing tenderness in the biceps tendon (Trapeizus, lateral subacromial, coracoid process, medial periscapular border)  Range of Motion   Active abduction: 120   Forward flexion: 160   Internal rotation 0 degrees: Lumbar     Muscle Strength   Right shoulder normal muscle strength: 4+/5 strength external rotation and supraspinatus  Abduction: 5/5   Internal rotation: 5/5     Tests   [place] test: positive (Mild)    Comments:  Negative belly press  Negative push-off  Mild pain with empty can test      Left Shoulder Exam     Muscle Strength   Abduction: 5/5   Internal rotation: 5/5   External rotation: 5/5   Supraspinatus: 5/5           Strength/Myotome Testing     Left Wrist/Hand   Normal wrist strength    Right Wrist/Hand   Normal wrist strength      Physical Exam  Vitals signs and nursing note reviewed  Constitutional:       General: She is not in acute distress  Appearance: She is well-developed  HENT:      Head: Normocephalic  Eyes:      Conjunctiva/sclera: Conjunctivae normal    Neck:      Trachea: No tracheal deviation     Cardiovascular:      Rate and Rhythm: Normal rate    Pulmonary:      Effort: Pulmonary effort is normal  No respiratory distress  Abdominal:      General: There is no distension  Skin:     General: Skin is warm and dry  Neurological:      Mental Status: She is alert and oriented to person, place, and time  Psychiatric:         Behavior: Behavior normal          I have personally reviewed pertinent films in PACS and my interpretation is No osseous abnormality of the right shoulder

## 2020-10-05 ENCOUNTER — OFFICE VISIT (OUTPATIENT)
Dept: OBGYN CLINIC | Facility: CLINIC | Age: 45
End: 2020-10-05
Payer: COMMERCIAL

## 2020-10-05 VITALS
HEIGHT: 62 IN | DIASTOLIC BLOOD PRESSURE: 72 MMHG | TEMPERATURE: 98.8 F | SYSTOLIC BLOOD PRESSURE: 106 MMHG | HEART RATE: 74 BPM | WEIGHT: 189 LBS | BODY MASS INDEX: 34.78 KG/M2

## 2020-10-05 DIAGNOSIS — S46.211D BICEPS STRAIN, RIGHT, SUBSEQUENT ENCOUNTER: ICD-10-CM

## 2020-10-05 DIAGNOSIS — M75.41 SUBACROMIAL IMPINGEMENT OF RIGHT SHOULDER: ICD-10-CM

## 2020-10-05 DIAGNOSIS — S46.001D ROTATOR CUFF INJURY, RIGHT, SUBSEQUENT ENCOUNTER: Primary | ICD-10-CM

## 2020-10-05 PROCEDURE — 99213 OFFICE O/P EST LOW 20 MIN: CPT | Performed by: FAMILY MEDICINE

## 2020-10-05 PROCEDURE — 20610 DRAIN/INJ JOINT/BURSA W/O US: CPT | Performed by: FAMILY MEDICINE

## 2020-10-05 PROCEDURE — 1036F TOBACCO NON-USER: CPT | Performed by: FAMILY MEDICINE

## 2020-10-05 RX ORDER — TRIAMCINOLONE ACETONIDE 40 MG/ML
40 INJECTION, SUSPENSION INTRA-ARTICULAR; INTRAMUSCULAR
Status: COMPLETED | OUTPATIENT
Start: 2020-10-05 | End: 2020-10-05

## 2020-10-05 RX ORDER — LIDOCAINE HYDROCHLORIDE 10 MG/ML
4 INJECTION, SOLUTION INFILTRATION; PERINEURAL
Status: COMPLETED | OUTPATIENT
Start: 2020-10-05 | End: 2020-10-05

## 2020-10-05 RX ORDER — LIDOCAINE HYDROCHLORIDE 10 MG/ML
3 INJECTION, SOLUTION INFILTRATION; PERINEURAL
Status: COMPLETED | OUTPATIENT
Start: 2020-10-05 | End: 2020-10-05

## 2020-10-05 RX ADMIN — LIDOCAINE HYDROCHLORIDE 3 ML: 10 INJECTION, SOLUTION INFILTRATION; PERINEURAL at 11:29

## 2020-10-05 RX ADMIN — TRIAMCINOLONE ACETONIDE 40 MG: 40 INJECTION, SUSPENSION INTRA-ARTICULAR; INTRAMUSCULAR at 11:29

## 2020-10-05 RX ADMIN — LIDOCAINE HYDROCHLORIDE 4 ML: 10 INJECTION, SOLUTION INFILTRATION; PERINEURAL at 11:29

## 2020-12-04 ENCOUNTER — TELEMEDICINE (OUTPATIENT)
Dept: FAMILY MEDICINE CLINIC | Facility: CLINIC | Age: 45
End: 2020-12-04
Payer: COMMERCIAL

## 2020-12-04 DIAGNOSIS — K21.9 GASTROESOPHAGEAL REFLUX DISEASE WITHOUT ESOPHAGITIS: ICD-10-CM

## 2020-12-04 DIAGNOSIS — M54.2 NECK PAIN: Primary | ICD-10-CM

## 2020-12-04 DIAGNOSIS — G43.809 OTHER MIGRAINE WITHOUT STATUS MIGRAINOSUS, NOT INTRACTABLE: ICD-10-CM

## 2020-12-04 PROBLEM — R11.0 NAUSEA: Status: ACTIVE | Noted: 2020-12-04

## 2020-12-04 PROCEDURE — 3725F SCREEN DEPRESSION PERFORMED: CPT | Performed by: FAMILY MEDICINE

## 2020-12-04 PROCEDURE — 99214 OFFICE O/P EST MOD 30 MIN: CPT | Performed by: FAMILY MEDICINE

## 2020-12-04 RX ORDER — PANTOPRAZOLE SODIUM 40 MG/1
40 TABLET, DELAYED RELEASE ORAL DAILY
Qty: 90 TABLET | Refills: 3 | Status: SHIPPED | OUTPATIENT
Start: 2020-12-04

## 2020-12-04 RX ORDER — TIZANIDINE 4 MG/1
4 TABLET ORAL EVERY 8 HOURS PRN
Qty: 60 TABLET | Refills: 1 | Status: SHIPPED | OUTPATIENT
Start: 2020-12-04 | End: 2020-12-10

## 2020-12-04 RX ORDER — SUMATRIPTAN 50 MG/1
50 TABLET, FILM COATED ORAL ONCE AS NEEDED
Qty: 9 TABLET | Refills: 0 | Status: SHIPPED | OUTPATIENT
Start: 2020-12-04 | End: 2021-01-04

## 2020-12-07 ENCOUNTER — TELEPHONE (OUTPATIENT)
Dept: GASTROENTEROLOGY | Facility: CLINIC | Age: 45
End: 2020-12-07

## 2020-12-07 ENCOUNTER — LAB (OUTPATIENT)
Dept: LAB | Facility: CLINIC | Age: 45
End: 2020-12-07
Payer: COMMERCIAL

## 2020-12-07 ENCOUNTER — TELEPHONE (OUTPATIENT)
Dept: FAMILY MEDICINE CLINIC | Facility: CLINIC | Age: 45
End: 2020-12-07

## 2020-12-07 DIAGNOSIS — R11.0 NAUSEA: ICD-10-CM

## 2020-12-07 DIAGNOSIS — R19.5 DARK STOOLS: ICD-10-CM

## 2020-12-07 DIAGNOSIS — R19.5 DARK STOOLS: Primary | ICD-10-CM

## 2020-12-07 LAB
BASOPHILS # BLD AUTO: 0.07 THOUSANDS/ΜL (ref 0–0.1)
BASOPHILS NFR BLD AUTO: 1 % (ref 0–1)
EOSINOPHIL # BLD AUTO: 0.21 THOUSAND/ΜL (ref 0–0.61)
EOSINOPHIL NFR BLD AUTO: 3 % (ref 0–6)
ERYTHROCYTE [DISTWIDTH] IN BLOOD BY AUTOMATED COUNT: 13.1 % (ref 11.6–15.1)
HCT VFR BLD AUTO: 45.2 % (ref 34.8–46.1)
HGB BLD-MCNC: 14.1 G/DL (ref 11.5–15.4)
IMM GRANULOCYTES # BLD AUTO: 0.04 THOUSAND/UL (ref 0–0.2)
IMM GRANULOCYTES NFR BLD AUTO: 1 % (ref 0–2)
LYMPHOCYTES # BLD AUTO: 2.11 THOUSANDS/ΜL (ref 0.6–4.47)
LYMPHOCYTES NFR BLD AUTO: 27 % (ref 14–44)
MCH RBC QN AUTO: 29 PG (ref 26.8–34.3)
MCHC RBC AUTO-ENTMCNC: 31.2 G/DL (ref 31.4–37.4)
MCV RBC AUTO: 93 FL (ref 82–98)
MONOCYTES # BLD AUTO: 0.47 THOUSAND/ΜL (ref 0.17–1.22)
MONOCYTES NFR BLD AUTO: 6 % (ref 4–12)
NEUTROPHILS # BLD AUTO: 4.82 THOUSANDS/ΜL (ref 1.85–7.62)
NEUTS SEG NFR BLD AUTO: 62 % (ref 43–75)
NRBC BLD AUTO-RTO: 0 /100 WBCS
PLATELET # BLD AUTO: 272 THOUSANDS/UL (ref 149–390)
PMV BLD AUTO: 10 FL (ref 8.9–12.7)
RBC # BLD AUTO: 4.86 MILLION/UL (ref 3.81–5.12)
WBC # BLD AUTO: 7.72 THOUSAND/UL (ref 4.31–10.16)

## 2020-12-07 PROCEDURE — 85025 COMPLETE CBC W/AUTO DIFF WBC: CPT

## 2020-12-07 PROCEDURE — 36415 COLL VENOUS BLD VENIPUNCTURE: CPT

## 2020-12-07 RX ORDER — ONDANSETRON 4 MG/1
4 TABLET, FILM COATED ORAL EVERY 8 HOURS PRN
Qty: 20 TABLET | Refills: 1 | Status: SHIPPED | OUTPATIENT
Start: 2020-12-07 | End: 2021-01-26

## 2020-12-10 ENCOUNTER — OFFICE VISIT (OUTPATIENT)
Dept: GASTROENTEROLOGY | Facility: CLINIC | Age: 45
End: 2020-12-10
Payer: COMMERCIAL

## 2020-12-10 ENCOUNTER — APPOINTMENT (OUTPATIENT)
Dept: LAB | Facility: HOSPITAL | Age: 45
End: 2020-12-10
Attending: FAMILY MEDICINE
Payer: COMMERCIAL

## 2020-12-10 VITALS
SYSTOLIC BLOOD PRESSURE: 114 MMHG | HEIGHT: 62 IN | HEART RATE: 76 BPM | BODY MASS INDEX: 35.81 KG/M2 | DIASTOLIC BLOOD PRESSURE: 66 MMHG | WEIGHT: 194.6 LBS

## 2020-12-10 DIAGNOSIS — R19.5 DARK STOOLS: ICD-10-CM

## 2020-12-10 DIAGNOSIS — K21.9 GASTROESOPHAGEAL REFLUX DISEASE WITHOUT ESOPHAGITIS: Primary | ICD-10-CM

## 2020-12-10 DIAGNOSIS — R11.0 NAUSEA: ICD-10-CM

## 2020-12-10 LAB
HEMOCCULT STL QL: NEGATIVE

## 2020-12-10 PROCEDURE — 99213 OFFICE O/P EST LOW 20 MIN: CPT | Performed by: PHYSICIAN ASSISTANT

## 2020-12-10 PROCEDURE — 1036F TOBACCO NON-USER: CPT | Performed by: PHYSICIAN ASSISTANT

## 2020-12-10 PROCEDURE — 3008F BODY MASS INDEX DOCD: CPT | Performed by: PHYSICIAN ASSISTANT

## 2020-12-10 PROCEDURE — 82272 OCCULT BLD FECES 1-3 TESTS: CPT

## 2020-12-11 DIAGNOSIS — R07.9 CHEST PAIN, UNSPECIFIED TYPE: Primary | ICD-10-CM

## 2021-01-01 DIAGNOSIS — G43.809 OTHER MIGRAINE WITHOUT STATUS MIGRAINOSUS, NOT INTRACTABLE: ICD-10-CM

## 2021-01-04 RX ORDER — SUMATRIPTAN 50 MG/1
50 TABLET, FILM COATED ORAL ONCE AS NEEDED
Qty: 9 TABLET | Refills: 0 | Status: SHIPPED | OUTPATIENT
Start: 2021-01-04 | End: 2021-09-13 | Stop reason: SDUPTHER

## 2021-01-26 ENCOUNTER — OFFICE VISIT (OUTPATIENT)
Dept: GASTROENTEROLOGY | Facility: CLINIC | Age: 46
End: 2021-01-26
Payer: COMMERCIAL

## 2021-01-26 VITALS
DIASTOLIC BLOOD PRESSURE: 62 MMHG | HEART RATE: 92 BPM | BODY MASS INDEX: 35.85 KG/M2 | WEIGHT: 194.8 LBS | HEIGHT: 62 IN | SYSTOLIC BLOOD PRESSURE: 94 MMHG

## 2021-01-26 DIAGNOSIS — K21.9 GASTROESOPHAGEAL REFLUX DISEASE WITHOUT ESOPHAGITIS: ICD-10-CM

## 2021-01-26 DIAGNOSIS — R11.0 NAUSEA: Primary | ICD-10-CM

## 2021-01-26 PROCEDURE — 1036F TOBACCO NON-USER: CPT | Performed by: PHYSICIAN ASSISTANT

## 2021-01-26 PROCEDURE — 3008F BODY MASS INDEX DOCD: CPT | Performed by: PHYSICIAN ASSISTANT

## 2021-01-26 PROCEDURE — 99213 OFFICE O/P EST LOW 20 MIN: CPT | Performed by: PHYSICIAN ASSISTANT

## 2021-01-26 NOTE — PROGRESS NOTES
[facility] - Outpatient Follow-up Note  Omid Yu 39 y o  female MRN: [id number]  Encounter: [id number]          ASSESSMENT AND PLAN:      1  Nausea  2  Gastroesophageal reflux disease without esophagitis  She continues to feel well on Pantoprazole 40mg once daily  She admits if she misses 1 pill she has significant reflux  She denies any further black stools - hemoccult was negative x 3  She is concerned as she gets occasional, random episodes of nausea/vomiting intermixed with chest pain    Continue Pantoprazole through the end of February then try to stop with a Pepcid taper  Check US - she still has her gallbladder  Last EGD was from 2/2019 and reported as normal    ______________________________________________________________________    SUBJECTIVE:  44-year-old female with GERD presents for follow-up  She continues to feel well on pantoprazole 40 mg once daily  She does admit intermittent episodes of chest pain and nausea with vomiting  She admits that chest pain has been so significant in the past she is going to the emergency room  Cardiac workup was recommended however she did not follow up for this  She reports the chest pain as burning and exacerbated by eating  There is no dysphagia, odynophagia, hematemesis  She denies any further episodes of black stools  Hemoccult x3 was negative after her last visit  She is not taking any more Pepto-Bismol  She reports her bowel movements remain normal   She admits that if she misses 1 dose of her pantoprazole she has significant reflux symptoms  Her last upper endoscopy was in February 2019 reported as normal   Biopsies were taken for H pylori and this was benign  REVIEW OF SYSTEMS IS OTHERWISE NEGATIVE        Historical Information   Past Medical History:   Diagnosis Date    Acid reflux disease     Allergic rhinitis, seasonal     Asthma     Hiatal hernia     Migraine     Ulcer of gastroesophageal junction Past Surgical History:   Procedure Laterality Date    SD EGD TRANSORAL BIOPSY SINGLE/MULTIPLE N/A 2/5/2019    Procedure: ESOPHAGOGASTRODUODENOSCOPY (EGD); Surgeon: Roni French DO;  Location: [facility]; Service: Gastroenterology    WISDOM TOOTH EXTRACTION       Social History   Social History     Substance and Sexual Activity   Alcohol Use Yes    Comment: social     Social History     Substance and Sexual Activity   Drug Use No     Social History     Tobacco Use   Smoking Status Never Smoker   Smokeless Tobacco Never Used     Family History   Problem Relation Age of Onset    Anxiety disorder Mother     Hyperlipidemia Mother     Anxiety disorder Sister     No Known Problems Brother     Anxiety disorder Daughter     Asthma Son     Diabetes Maternal Grandmother     No Known Problems Maternal Grandfather     No Known Problems Paternal Grandmother     No Known Problems Paternal Grandfather     No Known Problems Son     No Known Problems Brother     Anxiety disorder Brother     No Known Problems Brother        Meds/Allergies       Current Outpatient Medications:     pantoprazole (PROTONIX) 40 mg tablet    SUMAtriptan (IMITREX) 50 mg tablet    No Known Allergies        Objective     Blood pressure 94/62, pulse 92, height 5' 2" (1 575 m), weight 88 4 kg (194 lb 12 8 oz)  Body mass index is 35 63 kg/m²  PHYSICAL EXAM:      General Appearance:   Alert, cooperative, no distress   HEENT:   Normocephalic, atraumatic, anicteric      Neck:  Supple, symmetrical, trachea midline   Lungs:   Clear to auscultation bilaterally; no rales, rhonchi or wheezing; respirations unlabored    Heart[de-identified]   Regular rate and rhythm; no murmur, rub, or gallop     Abdomen:   Soft, non-tender, non-distended; normal bowel sounds; no masses, no organomegaly    Genitalia:   Deferred    Rectal:   Deferred    Extremities:  No cyanosis, clubbing or edema    Pulses:  2+ and symmetric    Skin:  No jaundice, rashes, or lesions  Lymph nodes:  No palpable cervical lymphadenopathy        Lab Results:   No visits with results within 1 Day(s) from this visit  Latest known visit with results is:   Appointment on 12/10/2020   Component Date Value    Fecal Occult Blood Diagn* 12/10/2020 Negative     Fecal Occult Blood Diagn* 12/10/2020 Negative     Fecal Occult Blood Diagn* 12/10/2020 Negative          Radiology Results:   No results found

## 2021-03-08 ENCOUNTER — TELEPHONE (OUTPATIENT)
Dept: GASTROENTEROLOGY | Facility: CLINIC | Age: 46
End: 2021-03-08

## 2021-03-08 DIAGNOSIS — R11.0 NAUSEA: Primary | ICD-10-CM

## 2021-03-08 RX ORDER — FAMOTIDINE 20 MG/1
20 TABLET, FILM COATED ORAL 2 TIMES DAILY
Qty: 60 TABLET | Refills: 0 | Status: SHIPPED | OUTPATIENT
Start: 2021-03-08 | End: 2021-04-12

## 2021-03-08 NOTE — TELEPHONE ENCOUNTER
Famotidine sent    Week 1: Pantoprazole every other day and Famotidine 20mg BID  Week 2: NO Pantoprazole;  Famotidine 20mg BID  Week 3: Famotidine once daily  Week 4 and after: Famotidine prn

## 2021-03-08 NOTE — TELEPHONE ENCOUNTER
Georgiana Gilford - patient called [facility] never received the prescription for the famotidine   Please send to [facility]  At [phone] ty

## 2021-03-09 ENCOUNTER — TELEPHONE (OUTPATIENT)
Dept: OBGYN CLINIC | Facility: CLINIC | Age: 46
End: 2021-03-09

## 2021-03-09 ENCOUNTER — ANNUAL EXAM (OUTPATIENT)
Dept: OBGYN CLINIC | Facility: CLINIC | Age: 46
End: 2021-03-09
Payer: COMMERCIAL

## 2021-03-09 VITALS
BODY MASS INDEX: 35.51 KG/M2 | WEIGHT: 193 LBS | DIASTOLIC BLOOD PRESSURE: 78 MMHG | HEIGHT: 62 IN | SYSTOLIC BLOOD PRESSURE: 118 MMHG

## 2021-03-09 DIAGNOSIS — Z12.31 ENCOUNTER FOR SCREENING MAMMOGRAM FOR MALIGNANT NEOPLASM OF BREAST: ICD-10-CM

## 2021-03-09 DIAGNOSIS — N97.9 FEMALE INFERTILITY: Primary | ICD-10-CM

## 2021-03-09 DIAGNOSIS — Z01.419 ENCOUNTER FOR GYNECOLOGICAL EXAMINATION (GENERAL) (ROUTINE) WITHOUT ABNORMAL FINDINGS: Primary | ICD-10-CM

## 2021-03-09 PROCEDURE — 3008F BODY MASS INDEX DOCD: CPT | Performed by: PHYSICIAN ASSISTANT

## 2021-03-09 PROCEDURE — 87624 HPV HI-RISK TYP POOLED RSLT: CPT | Performed by: STUDENT IN AN ORGANIZED HEALTH CARE EDUCATION/TRAINING PROGRAM

## 2021-03-09 PROCEDURE — S0612 ANNUAL GYNECOLOGICAL EXAMINA: HCPCS | Performed by: STUDENT IN AN ORGANIZED HEALTH CARE EDUCATION/TRAINING PROGRAM

## 2021-03-09 PROCEDURE — G0145 SCR C/V CYTO,THINLAYER,RESCR: HCPCS | Performed by: STUDENT IN AN ORGANIZED HEALTH CARE EDUCATION/TRAINING PROGRAM

## 2021-03-09 NOTE — PROGRESS NOTES
Assessment/Plan:    Encounter for gynecological examination (general) (routine) without abnormal findings  43 yo [id number] here for annual exam     10/18 NILM, HPV neg  Mammo due, slip given  Reviewed breast self awareness  Discussed healthy diet and exercise for healthy weight  Sexually active without problems  Desires pregnancy- recommended [facility] referral and PNV wants to discuss with her   Discussed anticipatory guidance for menopause  Diagnoses and all orders for this visit:    Encounter for gynecological examination (general) (routine) without abnormal findings    Encounter for screening mammogram for malignant neoplasm of breast  -     Mammo screening bilateral w 3d & cad; Future          Subjective:      Patient ID: Lesia Herndon is a 42 y o  female  43 yo here for yearly  She has cycles that last normal time with normal flow  She did skip one or two this past year  She is sexually active without problems  Not using contraception  Desires pregnancy  Would like one more child  The following portions of the patient's history were reviewed and updated as appropriate: allergies, current medications, past family history, past medical history, past social history, past surgical history and problem list     Review of Systems   Constitutional: Negative for chills and fever  HENT: Negative for ear pain and sore throat  Eyes: Negative for pain and visual disturbance  Respiratory: Negative for cough and shortness of breath  Cardiovascular: Negative for chest pain and palpitations  Gastrointestinal: Positive for constipation  Negative for abdominal pain, diarrhea, nausea and vomiting  Genitourinary: Negative for dyspareunia, dysuria, frequency, hematuria, pelvic pain, urgency, vaginal bleeding, vaginal discharge and vaginal pain  Musculoskeletal: Negative for arthralgias and back pain  Skin: Negative for color change and rash  Neurological: Negative for seizures and syncope  All other systems reviewed and are negative  Objective:      /78 (BP Location: Left arm, Patient Position: Sitting, Cuff Size: Standard)   Ht 5' 2" (1 575 m)   Wt 87 5 kg (193 lb)   LMP 02/18/2021 (Exact Date)   BMI 35 30 kg/m²          Physical Exam  Constitutional:       General: She is not in acute distress  Appearance: She is well-developed  She is obese  She is not diaphoretic  HENT:      Head: Normocephalic and atraumatic  Neck:      Musculoskeletal: Normal range of motion and neck supple  Thyroid: No thyromegaly  Pulmonary:      Effort: Pulmonary effort is normal    Chest:      Breasts: Breasts are symmetrical          Right: No inverted nipple, mass, nipple discharge, skin change or tenderness  Left: No inverted nipple, mass, nipple discharge, skin change or tenderness  Abdominal:      General: There is no distension  Palpations: Abdomen is soft  There is no mass  Tenderness: There is no abdominal tenderness  There is no guarding or rebound  Genitourinary:     Exam position: Supine  Labia:         Right: No rash, tenderness, lesion or injury  Left: No rash, tenderness, lesion or injury  Vagina: Normal  No vaginal discharge, erythema, tenderness or bleeding  Cervix: No cervical motion tenderness, discharge or friability  Uterus: Not enlarged and not tender  Adnexa:         Right: No mass, tenderness or fullness  Left: No mass, tenderness or fullness  Lymphadenopathy:      Cervical: No cervical adenopathy  Upper Body:      Right upper body: No supraclavicular, axillary or pectoral adenopathy  Left upper body: No supraclavicular, axillary or pectoral adenopathy

## 2021-03-09 NOTE — TELEPHONE ENCOUNTER
Pt requesting infertility referral, per Dr Cedric Bello, referral ordered for [facility], contacted patient

## 2021-03-09 NOTE — ASSESSMENT & PLAN NOTE
41 yo [id number] here for annual exam     10/18 NILM, HPV neg  Cotest collected  Mammo due, slip given  Reviewed breast self awareness  Discussed healthy diet and exercise for healthy weight  Sexually active without problems  Desires pregnancy- recommended [facility] referral and PNV wants to discuss with her   Discussed anticipatory guidance for menopause

## 2021-03-11 ENCOUNTER — EVALUATION (OUTPATIENT)
Dept: PHYSICAL THERAPY | Facility: CLINIC | Age: 46
End: 2021-03-11
Payer: COMMERCIAL

## 2021-03-11 DIAGNOSIS — M25.511 CHRONIC RIGHT SHOULDER PAIN: Primary | ICD-10-CM

## 2021-03-11 DIAGNOSIS — G89.29 CHRONIC RIGHT SHOULDER PAIN: Primary | ICD-10-CM

## 2021-03-11 PROCEDURE — 97161 PT EVAL LOW COMPLEX 20 MIN: CPT | Performed by: PHYSICAL THERAPIST

## 2021-03-11 PROCEDURE — 97110 THERAPEUTIC EXERCISES: CPT | Performed by: PHYSICAL THERAPIST

## 2021-03-11 RX ORDER — TIZANIDINE 4 MG/1
4 TABLET ORAL EVERY 8 HOURS PRN
COMMUNITY
End: 2021-09-13 | Stop reason: ALTCHOICE

## 2021-03-11 NOTE — PROGRESS NOTES
PT Evaluation     Today's date: 3/11/2021  Patient name: Arlan Cheadle  : 1975  MRN: [id number]  Referring provider: Jean-Claude Pelayo DO  Dx:   Encounter Diagnosis     ICD-10-CM    1  Chronic right shoulder pain  M25 511     G89 29        Start Time: 933  Stop Time: 103  Total time in clinic (min): 62 minutes    Assessment  Assessment details: Arlan Cheadle is a 42 y o  female who presents with pain, decreased strength, decreased ROM, decreased joint mobility and postural  dysfunction  Due to these impairments, Patient has difficulty performing a/iadls, recreational activities and engaging in social activities  Patient's clinical presentation is consistent with their referring diagnosis of right RC syndrome with developing adhesive capsulitis  Patient would benefit from skilled physical therapy to address their aforementioned impairments, improve their level of function and to improve their overall quality of life  Impairments: abnormal muscle firing, abnormal or restricted ROM, activity intolerance, impaired physical strength, lacks appropriate home exercise program, pain with function and poor posture   Understanding of Dx/Px/POC: excellent  Goals  Short Term Goals: to be achieved by 4 weeks  1) Patient to be independent with basic HEP  2) Decrease pain to 4/10 at it's worst   3) Increase UE strength by 1/2 MMT grade in all deficient planes  4) Increase UE ROM by > 5 deg in all deficient planes  5) Patient to report decreased sleep interruption secondary to pain  Long Term Goals: to be achieved by discharge  1) FOTO equal to or greater than 71   2) Patient to be independent with comprehensive HEP  3) Abolish pain for improved quality of life  4) Increase UE strength to 5/5 MMT grade in all deficient planes to improve a/iadls  5) Increase UE ROM to within 5 deg of contralateral UE to improve a/iadls  6) Patient to report no sleep interruption secondary to pain      Plan  Patient would benefit from: skilled PT  Planned modality interventions: biofeedback, cryotherapy, hydrotherapy, unattended electrical stimulation, thermotherapy: hydrocollator packs and low level laser therapy  Planned therapy interventions: activity modification, ADL retraining, behavior modification, body mechanics training, functional ROM exercises, home exercise program, IADL retraining, joint mobilization, manual therapy, massage, neuromuscular re-education, patient education, postural training, strengthening, stretching, therapeutic activities and therapeutic exercise  Frequency: 2-3x week  Duration in weeks: 12  Plan of Care beginning date: 3/11/2021  Plan of Care expiration date: 6/3/2021  Treatment plan discussed with: patient        Subjective Evaluation    History of Present Illness  Mechanism of injury: Patient reports that several months ago she developed insidious onset of right shoulder pain  Patient states that her pain is worst in the morning  Patient has pain laying on her right side or keeping it stationary for too long  Patient's sleep is interrupted  Patient feels that her pain is partially related to overuse from doing all her household chores  Patient's left shoulder is beginning to be sore from compensating  Patient received a steroid injeection several months ago without improvements  Patient denies experiencing tingling/numbness, crepitus, instability, dysphagia, dysarthria, dizziness, diplopia, drop attacks  Patient does have perceived weakness at times     Pain  Current pain rating: 3  At best pain ratin  At worst pain rating: 10  Location: diffuse right shoulder  Quality: sharp (heavy)  Alleviating factors: resting arm behind head in supine, rest   Exacerbated by: quick movements, laying on right side, swatting a fly, household chores, reaching behind back (buckling bra)    Social Support    Employment status: not working  Hand dominance: right      Diagnostic Tests  X-ray: normal (Right shoulder (8/25/20): No acute osseous abnormality)  Treatments  Previous treatment: medication  Patient Goals  Patient goals for therapy: decreased pain  Patient goal: return to premorbid norm, play tennis        Objective     Postural Observations  Seated posture: fair  Standing posture: fair        Palpation     Right   Muscle spasm in the upper trapezius  Tenderness of the upper trapezius  Tenderness     Right Shoulder  [facility] joint, bicipital groove and supraspinatus tendon  Neurological Testing     Sensation   Cervical/Thoracic   Left   Intact: light touch    Right   Intact: light touch    Active Range of Motion   Cervical/Thoracic Spine       Cervical    Flexion:  WFL  Extension:  WFL  Left lateral flexion:  WFL  Right lateral flexion:  WFL and with pain  Left rotation:  WFL  Right rotation:  WFL and with pain  Left Shoulder   Flexion: 175 degrees   Abduction: 175 degrees   External rotation BTH: T4   Internal rotation BTB: T6     Right Shoulder   Flexion: 135 degrees with pain  Abduction: 128 degrees with pain  External rotation BTH: T2 with pain  Internal rotation BTB: Active internal rotation behind the back: greater trochanter  with pain    Passive Range of Motion   Left Shoulder   Flexion: 175 degrees   Abduction: 175 degrees   External rotation 90°: 100 degrees   Internal rotation 90°: 70 degrees     Right Shoulder   Flexion: 142 degrees with pain  Abduction: 118 degrees with pain  External rotation 90°: 65 degrees with pain  Internal rotation 90°: 10 degrees with pain    Joint Play   Left Shoulder  Joints within functional limits are the anterior capsule, posterior capsule and inferior capsule  Right Shoulder  Hypomobile in the anterior capsule, posterior capsule and inferior capsule       Strength/Myotome Testing     Left Shoulder   Normal muscle strength    Right Shoulder     Planes of Motion   Flexion: 4 (+) pain   Abduction: 4 (+) pain   External rotation at 0°: 4+ (+) pain Internal rotation at 0°: 4+ (+) pain     Tests   Cervical   Negative vertical compression  Right   Negative Spurling's Test A and Spurling's Test B  Lumbar   Negative vertical compression  Precautions: standard      Manuals 3/11            Right shoulder PROM             Gr  II-IV Inf, AP GHJ mobs                                       Neuro Re-Ed                                                                                                        Ther Ex             UBE             Pulleys             Finger ladder: forward only             Supine shoulder flexion AAROM with cane 2x10 10"            Supine shoulder ER AAROM with cane 2x10 10"            Sleeper str    2x10 10"            Table slides (FF, scaption, ER)             IR with cane             ER - s/l             Prone row             Webslide: L rows                          Ther Activity                                       Gait Training                                       Modalities

## 2021-03-14 LAB
LAB AP GYN PRIMARY INTERPRETATION: NORMAL
Lab: NORMAL

## 2021-03-15 ENCOUNTER — OFFICE VISIT (OUTPATIENT)
Dept: PHYSICAL THERAPY | Facility: CLINIC | Age: 46
End: 2021-03-15
Payer: COMMERCIAL

## 2021-03-15 DIAGNOSIS — M25.511 CHRONIC RIGHT SHOULDER PAIN: Primary | ICD-10-CM

## 2021-03-15 DIAGNOSIS — G89.29 CHRONIC RIGHT SHOULDER PAIN: Primary | ICD-10-CM

## 2021-03-15 PROCEDURE — 97110 THERAPEUTIC EXERCISES: CPT

## 2021-03-15 NOTE — PROGRESS NOTES
Daily Note     Today's date: 3/15/2021  Patient name: Noel Swift  : 1975  MRN: [id number]  Referring provider: Jason Leblanc DO  Dx:   Encounter Diagnosis     ICD-10-CM    1  Chronic right shoulder pain  M25 511     G89 29        Start Time: 0950  Stop Time: 1040  Total time in clinic (min): 50 minutes    Subjective: Pt reports no change in symptoms since LV  Has been mostly compliant with HEP  Objective: See treatment diary below      Assessment: Tolerated treatment well  Initiated program as outlined below, focusing on ROM and some strengthening of RUE  Slight pain at end range with passive abd and ER of R shldr   Increased pain with supine cane flex, along lateral aspect of R shldr, as well as in R pec during last few reps  Patient would benefit from continued PT to further improve strength, decrease pain, and maximize function  Plan: Continue per plan of care  Monitor response to initial treatment NV  Precautions: standard      Manuals 3/11 3/15           Right shoulder PROM  AFB           Gr  II-IV Inf, AP GHJ mobs  nv                                     Neuro Re-Ed                                                                                                        Ther Ex             UBE  3'/3'           Pulleys  3 min           Finger ladder: forward only             Supine shoulder flexion AAROM with cane 2x10 10" 10"x10           Supine shoulder ER AAROM with cane 2x10 10" 10"x10           Sleeper str    2x10 10" 10"x10           Table slides (FF, scaption, ER)             IR with cane             ER - s/l  x10           Prone row  2x10           Webslide: L rows  RTB  2x10                        Ther Activity                                       Gait Training                                       Modalities

## 2021-03-18 ENCOUNTER — OFFICE VISIT (OUTPATIENT)
Dept: PHYSICAL THERAPY | Facility: CLINIC | Age: 46
End: 2021-03-18
Payer: COMMERCIAL

## 2021-03-18 DIAGNOSIS — G89.29 CHRONIC RIGHT SHOULDER PAIN: Primary | ICD-10-CM

## 2021-03-18 DIAGNOSIS — M25.511 CHRONIC RIGHT SHOULDER PAIN: Primary | ICD-10-CM

## 2021-03-18 PROCEDURE — 97140 MANUAL THERAPY 1/> REGIONS: CPT

## 2021-03-18 PROCEDURE — 97110 THERAPEUTIC EXERCISES: CPT

## 2021-03-18 NOTE — PROGRESS NOTES
Daily Note     Today's date: 3/18/2021  Patient name: Marin Lei  : 1975  MRN: [id number]  Referring provider: Nestor Venegas DO  Dx:   Encounter Diagnosis     ICD-10-CM    1  Chronic right shoulder pain  M25 511     G89 29        Start Time: 1030  Stop Time: 1120  Total time in clinic (min): 50 minutes    Subjective: pt reported that the mornings are the worst        Objective: See treatment diary below      Assessment:  Continued with treatment session  Tolerated treatment fair  Patient exhibited good technique with therapeutic exercises and would benefit from continued PT      Plan: Continue per plan of care  Precautions: standard      Manuals 3/11 3/15 3/18          Right shoulder PROM  AFB SC          Gr  II-IV Inf, AP GHJ mobs  nv NV ask ONEAL                                     Neuro Re-Ed                                                                                                        Ther Ex             UBE  3'/3' 3'/3'          Pulleys  3 min 3 min           Finger ladder: forward only   10x 5"           Supine shoulder flexion AAROM with cane 2x10 10" 10"x10 10" x 10          Supine shoulder ER AAROM with cane 2x10 10" 10"x10 10" x 10           Sleeper str    2x10 10" 10"x10           Table slides (FF, scaption, ER)             IR with cane             ER - s/l  x10 10x           Prone row  2x10 2x 10           Webslide: L rows  RTB  2x10 RTB 2x 10                        Ther Activity                                       Gait Training                                       Modalities                                       1 on 1 time 42 min on 3/18/21

## 2021-03-22 ENCOUNTER — OFFICE VISIT (OUTPATIENT)
Dept: PHYSICAL THERAPY | Facility: CLINIC | Age: 46
End: 2021-03-22
Payer: COMMERCIAL

## 2021-03-22 DIAGNOSIS — M25.511 CHRONIC RIGHT SHOULDER PAIN: Primary | ICD-10-CM

## 2021-03-22 DIAGNOSIS — G89.29 CHRONIC RIGHT SHOULDER PAIN: Primary | ICD-10-CM

## 2021-03-22 PROCEDURE — 97110 THERAPEUTIC EXERCISES: CPT | Performed by: PHYSICAL THERAPIST

## 2021-03-22 PROCEDURE — 97140 MANUAL THERAPY 1/> REGIONS: CPT | Performed by: PHYSICAL THERAPIST

## 2021-03-22 NOTE — PROGRESS NOTES
Daily Note     Today's date: 3/22/2021  Patient name: Cruz Soares  : 1975  MRN: [id number]  Referring provider: Richy Mckinley DO  Dx:   Encounter Diagnosis     ICD-10-CM    1  Chronic right shoulder pain  M25 511     G89 29        Start Time: 935  Stop Time: 1043  Total time in clinic (min): 68 minutes    Subjective: Patient reports no significant changes to overall status since previous treatment session  Patient had increased soreness following her previous treatment session, but the following day it resolved  Objective: See treatment diary below      Assessment: Tolerated treatment well  Patient demonstrated fatigue post treatment and would benefit from continued PT  Patient with significantly improved right shoulder PROM in all planes, except IR with firm end feel  Plan: Continue per plan of care  Progress treatment as tolerated  Precautions: standard      Manuals 3/11 3/15 3/18 3/22         Right shoulder PROM  AFB SC GR         Gr  II-IV Inf, AP GHJ mobs  nv NV ask ONEAL  GR                                   Neuro Re-Ed                                                                                                        Ther Ex             UBE  3'/3' 3'/3' 3' / 3'         Pulleys  3 min 3 min  30x5"         Finger ladder: forward only   10x 5"           Supine shoulder flexion AAROM with cane 2x10 10" 10"x10 10" x 10          Supine shoulder ER AAROM with cane 2x10 10" 10"x10 10" x 10           Sleeper str  2x10 10" 10"x10  15x10"         Table slides (FF, scaption, ER)    D/C         IR with cane    20x5"         ER - s/l  x10 10x  2x10 2#         Prone row  2x10 2x 10  2x10 2# 3"         Webslide: L rows  RTB  2x10 RTB 2x 10  RTB 2x10 3"         TB ER    RTB 2x10         Bilateral shoulder ER with scapular retraction    RTB 2x10 5"         ER str   At 90/90 against wall    10x10"                                   Ther Activity                                       Gait Training                                       Modalities

## 2021-03-25 ENCOUNTER — OFFICE VISIT (OUTPATIENT)
Dept: PHYSICAL THERAPY | Facility: CLINIC | Age: 46
End: 2021-03-25
Payer: COMMERCIAL

## 2021-03-25 DIAGNOSIS — G89.29 CHRONIC RIGHT SHOULDER PAIN: Primary | ICD-10-CM

## 2021-03-25 DIAGNOSIS — M25.511 CHRONIC RIGHT SHOULDER PAIN: Primary | ICD-10-CM

## 2021-03-25 PROCEDURE — 97110 THERAPEUTIC EXERCISES: CPT | Performed by: PHYSICAL MEDICINE & REHABILITATION

## 2021-03-25 PROCEDURE — 97140 MANUAL THERAPY 1/> REGIONS: CPT | Performed by: PHYSICAL MEDICINE & REHABILITATION

## 2021-03-25 PROCEDURE — 97112 NEUROMUSCULAR REEDUCATION: CPT | Performed by: PHYSICAL MEDICINE & REHABILITATION

## 2021-03-25 NOTE — PROGRESS NOTES
Daily Note     Today's date: 3/25/2021  Patient name: Mohinder Vicente  : 1975  MRN: [id number]  Referring provider: Baldomero Perera DO  Dx:   Encounter Diagnosis     ICD-10-CM    1  Chronic right shoulder pain  M25 511     G89 29                   Subjective: Patient notes continued symptoms without significant change since last visit  Some soreness with addition of new TE following last visit, resolved quickly  Objective: See treatment diary below    Assessment: Tolerated treatment well  Patient challenged with TE as charted today, may tolerate progression of TB resistance on rows nv  Limited ER/IR range with discomfort at end of available range  Patient demonstrated fatigue post treatment and would benefit from continued PT  Plan: Continue per plan of care  Progress treatment as tolerated  Precautions: standard    Manuals 3/11 3/15 3/18 3/22 3/25        Right shoulder PROM  AFB SC GR LH        Gr  II-IV Inf, AP GHJ mobs  nv NV ask ONEAL  GR LH                                  Neuro Re-Ed                                                                                                        Ther Ex     3/25        UBE  3'/3' 3'/3' 3' / 3' 3'/3'        Pulleys  3 min 3 min  30x5" 30x5"        Finger ladder: forward only   10x 5"           Supine shoulder flexion AAROM with cane 2x10 10" 10"x10 10" x 10          Supine shoulder ER AAROM with cane 2x10 10" 10"x10 10" x 10           Sleeper str  2x10 10" 10"x10  15x10" 10x10"        Table slides (FF, scaption, ER)    D/C ---        IR with cane    20x5" 20x5"        ER - s/l  x10 10x  2x10 2# 2x10 2#        Prone row  2x10 2x 10  2x10 2# 3" 2x10 2#        Webslide: L rows  RTB  2x10 RTB 2x 10  RTB 2x10 3" RTB 2x10x3" inc       TB ER    RTB 2x10 RTB 2x10        Bilateral shoulder ER with scapular retraction    RTB 2x10 5" RTB 2x10x5"        ER str   At 90/90 against wall    10x10" 10x10"                                  Ther Activity Gait Training                                       Modalities

## 2021-03-29 ENCOUNTER — APPOINTMENT (OUTPATIENT)
Dept: PHYSICAL THERAPY | Facility: CLINIC | Age: 46
End: 2021-03-29
Payer: COMMERCIAL

## 2021-04-01 ENCOUNTER — APPOINTMENT (OUTPATIENT)
Dept: PHYSICAL THERAPY | Facility: CLINIC | Age: 46
End: 2021-04-01
Payer: COMMERCIAL

## 2021-04-05 ENCOUNTER — OFFICE VISIT (OUTPATIENT)
Dept: PHYSICAL THERAPY | Facility: CLINIC | Age: 46
End: 2021-04-05
Payer: COMMERCIAL

## 2021-04-05 DIAGNOSIS — M25.511 CHRONIC RIGHT SHOULDER PAIN: Primary | ICD-10-CM

## 2021-04-05 DIAGNOSIS — G89.29 CHRONIC RIGHT SHOULDER PAIN: Primary | ICD-10-CM

## 2021-04-05 PROCEDURE — 97110 THERAPEUTIC EXERCISES: CPT | Performed by: PHYSICAL THERAPIST

## 2021-04-05 PROCEDURE — 97140 MANUAL THERAPY 1/> REGIONS: CPT | Performed by: PHYSICAL THERAPIST

## 2021-04-05 NOTE — PROGRESS NOTES
Daily Note     Today's date: 2021  Patient name: Femi Chong  : 1975  MRN: [id number]  Referring provider: Farhana Munroe DO  Dx:   Encounter Diagnosis     ICD-10-CM    1  Chronic right shoulder pain  M25 511     G89 29        Start Time: 934  Stop Time: 103  Total time in clinic (min): 61 minutes    Subjective: Patient reports that her right shoulder pain seems to be getting more severe and is difficult to move in the morning  Patient was unable to perform her exercises over the past week d/t having to care for her mother  Patient is considering calling sports med to discuss MRI  Objective: See treatment diary below      Assessment: Tolerated treatment fair  Patient demonstrated fatigue post treatment and would benefit from continued PT  Patient with significantly improved right shoulder PROM in all planes except IR  Patient continues to present with significantly limited ROM into IR  Patient admitted to not performing her IR and posterior capsule stretches at home  Patient reeducated regarding importance of HEP compliance to maximize rehabilitation outcome  Plan: Continue per plan of care  Progress treatment as tolerated  Precautions: standard    Manuals 3/11 3/15 3/18 3/22 3/25 45       Right shoulder PROM  AFB SC GR LH GR       Gr  II-IV Inf, AP GHJ mobs  nv NV ask ONEAL  GR LH GR       Right posterior capsule str  GR                    Neuro Re-Ed                                                                                                        Ther Ex     3/25        UBE  3'/3' 3'/3' 3' / 3' 3'/3' 3' / 3'       Pulleys  3 min 3 min  30x5" 30x5" 5 min       Finger ladder: forward only   10x 5"           Supine shoulder flexion AAROM with cane 2x10 10" 10"x10 10" x 10          Supine shoulder ER AAROM with cane 2x10 10" 10"x10 10" x 10           Sleeper str    2x10 10" 10"x10  15x10" 10x10" 15x10"       Table slides (FF, scaption, ER)    D/C ---        IR with cane 20x5" 20x5" 13x5"       ER - s/l  x10 10x  2x10 2# 2x10 2#        Prone row  2x10 2x 10  2x10 2# 3" 2x10 2#        Webslide: L rows  RTB  2x10 RTB 2x 10  RTB 2x10 3" RTB 2x10x3" inc       TB ER    RTB 2x10 RTB 2x10        Bilateral shoulder ER with scapular retraction    RTB 2x10 5" RTB 2x10x5"        ER str  At 90/90 against wall    10x10" 10x10"        Supine posterior capsule str        15x10"                    Ther Activity                                       Gait Training                                       Modalities

## 2021-04-08 ENCOUNTER — EVALUATION (OUTPATIENT)
Dept: PHYSICAL THERAPY | Facility: CLINIC | Age: 46
End: 2021-04-08
Payer: COMMERCIAL

## 2021-04-08 DIAGNOSIS — G89.29 CHRONIC RIGHT SHOULDER PAIN: Primary | ICD-10-CM

## 2021-04-08 DIAGNOSIS — M25.511 CHRONIC RIGHT SHOULDER PAIN: Primary | ICD-10-CM

## 2021-04-08 PROCEDURE — 97110 THERAPEUTIC EXERCISES: CPT | Performed by: PHYSICAL THERAPIST

## 2021-04-08 PROCEDURE — 97140 MANUAL THERAPY 1/> REGIONS: CPT | Performed by: PHYSICAL THERAPIST

## 2021-04-08 NOTE — PROGRESS NOTES
PT Re-Evaluation     Today's date: 2021  Patient name: Zo Strong  : 1975  MRN: [id number]  Referring provider: Rachell Aguilar DO  Dx:   Encounter Diagnosis     ICD-10-CM    1  Chronic right shoulder pain  M25 511     G89 29        Start Time: 945  Stop Time: 1030  Total time in clinic (min): 45 minutes    Assessment  Assessment details: Since beginning physical therapy, Sun Dominguez has attended a total number of 7 visits and has maintained excellent compliance with established POC  Patient has made gradual improvements in shoulder ROM and strength, however, her pain remains unchanged  Patient does continue to present with limited GHJ mobility and ROM compared to contralateral, as well as UE weakness  Patient would continue to benefit from skilled physical therapy to address her aforementioned impairments, improve their level of function and to improve their overall quality of life  Impairments: abnormal muscle firing, abnormal or restricted ROM, activity intolerance, impaired physical strength, lacks appropriate home exercise program, pain with function and poor posture   Understanding of Dx/Px/POC: excellent   Prognosis: good    Goals  Short Term Goals: to be achieved by 4 weeks  1) Patient to be independent with basic HEP  MET  2) Decrease pain to 4/10 at it's worst  NOT MET  3) Increase UE strength by 1/2 MMT grade in all deficient planes  MET  4) Increase UE ROM by > 5 deg in all deficient planes  MET  5) Patient to report decreased sleep interruption secondary to pain  MET    Long Term Goals: to be achieved by discharge ALL GOALS PROGRESSING  1) FOTO equal to or greater than 71   2) Patient to be independent with comprehensive HEP  3) Abolish pain for improved quality of life  4) Increase UE strength to 5/5 MMT grade in all deficient planes to improve a/iadls  5) Increase UE ROM to within 5 deg of contralateral UE to improve a/iadls    6) Patient to report no sleep interruption secondary to pain     Plan  Patient would benefit from: skilled PT  Planned modality interventions: biofeedback, cryotherapy, hydrotherapy, unattended electrical stimulation, thermotherapy: hydrocollator packs and low level laser therapy  Planned therapy interventions: activity modification, ADL retraining, behavior modification, body mechanics training, functional ROM exercises, home exercise program, IADL retraining, joint mobilization, manual therapy, massage, neuromuscular re-education, patient education, postural training, strengthening, stretching, therapeutic activities and therapeutic exercise  Frequency: 2-3x week  Duration in weeks: 6  Plan of Care beginning date: 2021  Plan of Care expiration date: 2021  Treatment plan discussed with: patient        Subjective Evaluation    History of Present Illness  Mechanism of injury: Patient reports no significant changes to overall status since beginning physical therapy  Patient continues to have pain when reaching behind her back, when first waking up in the morning and with quick movements  Patient's arm continues to feel heavy in the morning with difficulty moving it  Patient's next f/u with ortho is scheduled for 21  Pain  Current pain ratin  At best pain ratin  At worst pain ratin  Location: right shoulder - diffuse    Treatments  Current treatment: physical therapy  Patient Goals  Patient goal: return to premorbid norm, play tennis        Objective     Postural Observations  Seated posture: fair  Standing posture: fair        Palpation     Right   Muscle spasm in the upper trapezius  Tenderness of the upper trapezius  Tenderness     Right Shoulder  Tenderness in the [facility] joint, bicipital groove and supraspinatus tendon       Neurological Testing     Sensation   Cervical/Thoracic   Left   Intact: light touch    Right   Intact: light touch    Active Range of Motion   Cervical/Thoracic Spine     Normal active range of motion  Left Shoulder Flexion: 175 degrees   Abduction: 175 degrees   External rotation BTH: T4   Internal rotation BTB: T6     Right Shoulder   Flexion: 160 degrees with pain  Abduction: 155 degrees with pain  External rotation BTH: T3 with pain  Internal rotation BTB: L5 with pain    Passive Range of Motion   Left Shoulder   Flexion: 175 degrees   Abduction: 175 degrees   External rotation 90°: 100 degrees   Internal rotation 90°: 70 degrees     Right Shoulder   Flexion: 152 degrees with pain  Abduction: 158 degrees with pain  External rotation 90°: 65 degrees with pain  Internal rotation 90°: 25 degrees with pain    Joint Play   Left Shoulder  Joints within functional limits are the anterior capsule, posterior capsule and inferior capsule  Right Shoulder  Hypomobile in the anterior capsule, posterior capsule and inferior capsule  Strength/Myotome Testing     Left Shoulder   Normal muscle strength    Right Shoulder     Planes of Motion   Flexion: 4 (+) pain   Abduction: 4 (+) pain   External rotation at 0°: 5   Internal rotation at 0°: 5 (+) pain     Tests   Cervical   Negative vertical compression  Right   Negative Spurling's Test A and Spurling's Test B  Lumbar   Negative vertical compression  Precautions: standard    Manuals 3/11 3/15 3/18 3/22 3/25 4/5 4/8      Right shoulder PROM  AFB SC GR LH GR       Gr  II-IV Inf, AP GHJ mobs  nv NV ask ONEAL  GR LH GR       Right posterior capsule str  GR       Reassessment       GR      Neuro Re-Ed                                                                                                        Ther Ex     3/25        UBE  3'/3' 3'/3' 3' / 3' 3'/3' 3' / 3' 3' / 3'      Pulleys  3 min 3 min  30x5" 30x5" 5 min       Finger ladder: forward only   10x 5"           Supine shoulder flexion AAROM with cane 2x10 10" 10"x10 10" x 10          Supine shoulder ER AAROM with cane 2x10 10" 10"x10 10" x 10           Sleeper str    2x10 10" 10"x10  15x10" 10x10" 15x10" Table slides (FF, scaption, ER)    D/C ---        IR with cane    20x5" 20x5" 13x5"       ER - s/l  x10 10x  2x10 2# 2x10 2#        Prone row  2x10 2x 10  2x10 2# 3" 2x10 2#        Webslide: L rows  RTB  2x10 RTB 2x 10  RTB 2x10 3" RTB 2x10x3" inc       TB ER    RTB 2x10 RTB 2x10        Bilateral shoulder ER with scapular retraction    RTB 2x10 5" RTB 2x10x5"        ER str  At 90/90 against wall    10x10" 10x10"        Supine posterior capsule str        15x10"       Patient education / HEP review       GR      Prone shoulder ext, h abd, scaption       NV      Scaption wall slides with TB       NV                                Ther Activity                                       Gait Training                                       Modalities

## 2021-04-10 DIAGNOSIS — R11.0 NAUSEA: ICD-10-CM

## 2021-04-12 ENCOUNTER — OFFICE VISIT (OUTPATIENT)
Dept: PHYSICAL THERAPY | Facility: CLINIC | Age: 46
End: 2021-04-12
Payer: COMMERCIAL

## 2021-04-12 DIAGNOSIS — M25.511 CHRONIC RIGHT SHOULDER PAIN: Primary | ICD-10-CM

## 2021-04-12 DIAGNOSIS — G89.29 CHRONIC RIGHT SHOULDER PAIN: Primary | ICD-10-CM

## 2021-04-12 PROCEDURE — 97140 MANUAL THERAPY 1/> REGIONS: CPT

## 2021-04-12 PROCEDURE — 97110 THERAPEUTIC EXERCISES: CPT

## 2021-04-12 RX ORDER — FAMOTIDINE 20 MG/1
TABLET, FILM COATED ORAL
Qty: 60 TABLET | Refills: 0 | Status: SHIPPED | OUTPATIENT
Start: 2021-04-12 | End: 2021-09-13 | Stop reason: SDUPTHER

## 2021-04-12 NOTE — PROGRESS NOTES
Daily Note     Today's date: 2021  Patient name: Beti Wall  : 1975  MRN: [id number]  Referring provider: Malaika Hairston DO  Dx:   Encounter Diagnosis     ICD-10-CM    1  Chronic right shoulder pain  M25 511     G89 29                   Subjective: Pt noted feeling okay but noted that it is morning therefore doesn't have a lot of pain at the moment  Noted that Gr  II-IV Inf, AP GHJ mobs felt good last time and gave her some relief  Objective: See treatment diary below      Assessment:  Continued with treatment session,  Progressed patient as she was able today with prone I, T, Y and wall scaption slide with RTB   Reviewed other exercises with RTB today as well as received a Tolerated treatment fair  Patient demonstrated fatigue post treatment, exhibited good technique with therapeutic exercises and would benefit from continued PT  S/p treatment session, no significant changes  Plan: Continue per plan of care  Precautions: standard    Manuals 3/11 3/15 3/18 3/22 3/25 4/5 4/8 4/12     Right shoulder PROM  AFB SC GR LH GR  SC     Gr  II-IV Inf, AP GHJ mobs  nv NV ask ONEAL  GR LH GR  GR     Right posterior capsule str  GR       Reassessment       GR      Neuro Re-Ed                                                                                                        Ther Ex     3/25        UBE  3'/3' 3'/3' 3' / 3' 3'/3' 3' / 3' 3' / 3' skipped today      Pulleys  3 min 3 min  30x5" 30x5" 5 min  5 min      Finger ladder: forward only   10x 5"           Supine shoulder flexion AAROM with cane 2x10 10" 10"x10 10" x 10          Supine shoulder ER AAROM with cane 2x10 10" 10"x10 10" x 10           Sleeper str    2x10 10" 10"x10  15x10" 10x10" 15x10"       Table slides (FF, scaption, ER)    D/C ---        IR with cane    20x5" 20x5" 13x5"       ER - s/l  x10 10x  2x10 2# 2x10 2#        Prone row  2x10 2x 10  2x10 2# 3" 2x10 2#        Webslide: L rows  RTB  2x10 RTB 2x 10  RTB 2x10 3" RTB 2x10x3" inc       TB ER    RTB 2x10 RTB 2x10        Bilateral shoulder ER with scapular retraction    RTB 2x10 5" RTB 2x10x5"        ER str  At 90/90 against wall    10x10" 10x10"        Supine posterior capsule str        15x10"       Patient education / HEP review       GR      Prone shoulder ext, h abd, scaption       NV 2x 10      Scaption wall slides with TB       NV 2x 10 RTB                                Ther Activity                                       Gait Training                                       Modalities                                          31 min 1 on 1 time 4/12/21

## 2021-04-15 ENCOUNTER — OFFICE VISIT (OUTPATIENT)
Dept: PHYSICAL THERAPY | Facility: CLINIC | Age: 46
End: 2021-04-15
Payer: COMMERCIAL

## 2021-04-15 DIAGNOSIS — M25.511 CHRONIC RIGHT SHOULDER PAIN: Primary | ICD-10-CM

## 2021-04-15 DIAGNOSIS — G89.29 CHRONIC RIGHT SHOULDER PAIN: Primary | ICD-10-CM

## 2021-04-15 PROCEDURE — 97140 MANUAL THERAPY 1/> REGIONS: CPT | Performed by: PHYSICAL THERAPIST

## 2021-04-15 PROCEDURE — 97110 THERAPEUTIC EXERCISES: CPT | Performed by: PHYSICAL THERAPIST

## 2021-04-15 NOTE — PROGRESS NOTES
Daily Note     Today's date: 4/15/2021  Patient name: Kiera Garcia  : 1975  MRN: [id number]  Referring provider: Cielo Hillman DO  Dx:   Encounter Diagnosis     ICD-10-CM    1  Chronic right shoulder pain  M25 511     G89 29        Start Time: 945  Stop Time: 1040  Total time in clinic (min): 55 minutes    Subjective: Patient reports that she had 2 days of relief from her shoulder pain following her previous treatment session  Patient reports that her pain is becoming more localized and is no longer posteriorly  Objective: See treatment diary below      Assessment: Tolerated treatment well  Patient demonstrated fatigue post treatment and would benefit from continued PT  Patient reported increased muscular soreness at end of session  Patient continues to benefit from [facility] mobilizations and posterior capsule stretching  Plan: Continue per plan of care  Progress treatment as tolerated  Precautions: standard    Manuals 3/11 3/15 3/18 3/22 3/25 4/5 4/8 4/12 4/15    Right shoulder PROM  AFB SC GR LH GR  SC GR    Gr  II-IV Inf, AP GHJ mobs  nv NV ask ONEAL  GR LH GR  GR GR    Right posterior capsule str  GR   GR    Reassessment       GR      Neuro Re-Ed                                                                                                        Ther Ex     3/25        UBE  3'/3' 3'/3' 3' / 3' 3'/3' 3' / 3' 3' / 3' skipped today  3' / 3'    Pulleys  3 min 3 min  30x5" 30x5" 5 min  5 min  20x5"    Finger ladder: forward only   10x 5"           Supine shoulder flexion AAROM with cane 2x10 10" 10"x10 10" x 10          Supine shoulder ER AAROM with cane 2x10 10" 10"x10 10" x 10           Sleeper str    2x10 10" 10"x10  15x10" 10x10" 15x10"       Table slides (FF, scaption, ER)    D/C ---        IR with cane    20x5" 20x5" 13x5"       ER - s/l  x10 10x  2x10 2# 2x10 2#        Prone row  2x10 2x 10  2x10 2# 3" 2x10 2#        Webslide: L rows  RTB  2x10 RTB 2x 10  RTB 2x10 3" RTB 2x10x3" inc       TB ER    RTB 2x10 RTB 2x10        Bilateral shoulder ER with scapular retraction    RTB 2x10 5" RTB 2x10x5"        ER str  At 90/90 against wall    10x10" 10x10"        Supine posterior capsule str  15x10"       Patient education / HEP review       GR      Prone shoulder ext, h abd, scaption       NV 2x 10  2x10 3" ea  Scaption wall slides with TB       NV 2x 10 RTB  2x10 RTB    Multidirectional ball stability on wall (V, H, CW/CCW circles)         Green 20x ea                   Ther Activity                                       Gait Training                                       Modalities

## 2021-04-19 ENCOUNTER — OFFICE VISIT (OUTPATIENT)
Dept: PHYSICAL THERAPY | Facility: CLINIC | Age: 46
End: 2021-04-19
Payer: COMMERCIAL

## 2021-04-19 DIAGNOSIS — M25.511 CHRONIC RIGHT SHOULDER PAIN: Primary | ICD-10-CM

## 2021-04-19 DIAGNOSIS — G89.29 CHRONIC RIGHT SHOULDER PAIN: Primary | ICD-10-CM

## 2021-04-19 PROCEDURE — 97140 MANUAL THERAPY 1/> REGIONS: CPT | Performed by: PHYSICAL THERAPIST

## 2021-04-19 PROCEDURE — 97110 THERAPEUTIC EXERCISES: CPT | Performed by: PHYSICAL THERAPIST

## 2021-04-19 NOTE — PROGRESS NOTES
Daily Note     Today's date: 2021  Patient name: Sylvia Manuel  : 1975  MRN: [id number]  Referring provider: Karlos Delvalle DO  Dx:   Encounter Diagnosis     ICD-10-CM    1  Chronic right shoulder pain  M25 511     G89 29        Start Time: 1015  Stop Time: 1108  Total time in clinic (min): 53 minutes    Subjective: Patient reports that the mornings continue to be the most uncomfortable  Patient does have temporary relief for a day or two when leaving PT, but her pain returns  Objective: See treatment diary below      Assessment: Tolerated treatment well  Patient demonstrated fatigue post treatment and would benefit from continued PT  Patient continues to lack IR ROM with limited posterior capsule flexibility  Patient with significant fatigue following RC and periscapular strengthening  Plan: Continue per plan of care  Progress treatment as tolerated  Precautions: standard    Manuals 3/11 3/15 3/18 3/22 3/25 4/5 4/8 4/12 4/15 4/19   Right shoulder PROM  AFB SC GR LH GR  SC GR GR   Gr  II-IV Inf, AP GHJ mobs  nv NV ask ONEAL  GR LH GR  GR GR GR   Right posterior capsule str  GR   GR GR   Reassessment       GR      Neuro Re-Ed                                                                                                        Ther Ex     3/25        UBE  3'/3' 3'/3' 3' / 3' 3'/3' 3' / 3' 3' / 3' skipped today  3' / 3' 3' / 3'   Pulleys  3 min 3 min  30x5" 30x5" 5 min  5 min  20x5" 20x5"   Finger ladder: forward only   10x 5"           Supine shoulder flexion AAROM with cane 2x10 10" 10"x10 10" x 10          Supine shoulder ER AAROM with cane 2x10 10" 10"x10 10" x 10           Sleeper str    2x10 10" 10"x10  15x10" 10x10" 15x10"       Table slides (FF, scaption, ER)    D/C ---        IR with cane    20x5" 20x5" 13x5"       ER - s/l  x10 10x  2x10 2# 2x10 2#        Prone row  2x10 2x 10  2x10 2# 3" 2x10 2#        Webslide: L rows  RTB  2x10 RTB 2x 10  RTB 2x10 3" RTB 2x10x3" inc TB ER    RTB 2x10 RTB 2x10        Bilateral shoulder ER with scapular retraction    RTB 2x10 5" RTB 2x10x5"     RTB 2x10 5"   ER str  At 90/90 against wall    10x10" 10x10"        Supine posterior capsule str  15x10"       Patient education / HEP review       GR      Prone shoulder ext, h abd, scaption       NV 2x 10  2x10 3" ea  10x3" 2# ea  Scaption wall slides with TB       NV 2x 10 RTB  2x10 RTB    Multidirectional ball stability on wall (V, H, CW/CCW circles)         Green 20x ea  Green 20x ea                  Ther Activity                                       Gait Training                                       Modalities

## 2021-04-22 ENCOUNTER — OFFICE VISIT (OUTPATIENT)
Dept: PHYSICAL THERAPY | Facility: CLINIC | Age: 46
End: 2021-04-22
Payer: COMMERCIAL

## 2021-04-22 DIAGNOSIS — G89.29 CHRONIC RIGHT SHOULDER PAIN: Primary | ICD-10-CM

## 2021-04-22 DIAGNOSIS — M25.511 CHRONIC RIGHT SHOULDER PAIN: Primary | ICD-10-CM

## 2021-04-22 PROCEDURE — 97140 MANUAL THERAPY 1/> REGIONS: CPT | Performed by: PHYSICAL THERAPIST

## 2021-04-22 PROCEDURE — 97110 THERAPEUTIC EXERCISES: CPT | Performed by: PHYSICAL THERAPIST

## 2021-04-22 NOTE — PROGRESS NOTES
Daily Note     Today's date: 2021  Patient name: Donny Braun  : 1975  MRN: [id number]  Referring provider: Darryl Aldana DO  Dx:   Encounter Diagnosis     ICD-10-CM    1  Chronic right shoulder pain  M25 511     G89 29        Start Time: 945  Stop Time: 1035  Total time in clinic (min): 50 minutes    Subjective: Patient reports that she was raking and is more sore  Objective: See treatment diary below      Assessment: Tolerated treatment well  Patient demonstrated fatigue post treatment and would benefit from continued PT  Patient challenged with overhead strengthening, reporting increased pain  Patient with gradually improving IR ROM and posterior capsule flexibility  Plan: Continue per plan of care  Progress treatment as tolerated  Precautions: standard    Manuals 4/22 3/15 3/18 3/22 3/25 4/5 4/8 4/12 4/15 4/19   Right shoulder PROM GR AFB SC GR LH GR  SC GR GR   Gr  II-IV Inf, AP GHJ mobs GR nv NV ask ONEAL  GR LH GR  GR GR GR   Right posterior capsule str  GR     GR   GR GR   Reassessment       GR      Neuro Re-Ed                                                                                                        Ther Ex     3/25        UBE 3' / 3' 3'/3' 3'/3' 3' / 3' 3'/3' 3' / 3' 3' / 3' skipped today  3' / 3' 3' / 3'   Pulleys 30x5" 3 min 3 min  30x5" 30x5" 5 min  5 min  20x5" 20x5"   Finger ladder: forward only   10x 5"           Supine shoulder flexion AAROM with cane  10"x10 10" x 10          Supine shoulder ER AAROM with cane  10"x10 10" x 10           Sleeper str     10"x10  15x10" 10x10" 15x10"       Table slides (FF, scaption, ER)    D/C ---        IR with cane    20x5" 20x5" 13x5"       ER - s/l  x10 10x  2x10 2# 2x10 2#        Prone row  2x10 2x 10  2x10 2# 3" 2x10 2#        Webslide: L rows  RTB  2x10 RTB 2x 10  RTB 2x10 3" RTB 2x10x3" inc       TB ER    RTB 2x10 RTB 2x10        Bilateral shoulder ER with scapular retraction    RTB 2x10 5" RTB 2x10x5"     RTB 2x10 5"   ER str  At 90/90 against wall    10x10" 10x10"        Supine posterior capsule str  15x10"       Patient education / HEP review       GR      Prone shoulder ext, h abd, scaption       NV 2x 10  2x10 3" ea  10x3" 2# ea  Scaption wall slides with TB       NV 2x 10 RTB  2x10 RTB    Multidirectional ball stability on wall (V, H, CW/CCW circles) GMB 20x ea  Green 20x ea  Green 20x ea     PNF D2 UE flex with TB YTB 2x10            Standing horizontal abduction with TB YTB 2x10            Ther Activity                                       Gait Training                                       Modalities

## 2021-04-26 ENCOUNTER — OFFICE VISIT (OUTPATIENT)
Dept: PHYSICAL THERAPY | Facility: CLINIC | Age: 46
End: 2021-04-26
Payer: COMMERCIAL

## 2021-04-26 DIAGNOSIS — G89.29 CHRONIC RIGHT SHOULDER PAIN: Primary | ICD-10-CM

## 2021-04-26 DIAGNOSIS — M25.511 CHRONIC RIGHT SHOULDER PAIN: Primary | ICD-10-CM

## 2021-04-26 PROCEDURE — 97110 THERAPEUTIC EXERCISES: CPT

## 2021-04-26 PROCEDURE — 97140 MANUAL THERAPY 1/> REGIONS: CPT

## 2021-04-26 NOTE — PROGRESS NOTES
Daily Note     Today's date: 2021  Patient name: Verenice Mar  : 1975  MRN: [id number]  Referring provider: Richard Gracia DO  Dx:   Encounter Diagnosis     ICD-10-CM    1  Chronic right shoulder pain  M25 511     G89 29        Start Time: 1015  Stop Time: 1105  Total time in clinic (min): 50 minutes    Subjective: Pt noted her pain is still present in the mornings and noted that she knows her limits t/o the day on what to do and what not to due to make her R shoulder not flair up  Pt noted that she no longer has a lot of pain in her scapular region  Pt noted when she tries to get up out of bed is when she feels it the most      MD appointment on Wednesday  Objective: See treatment diary below      Assessment:  Continued with treatment session, Continued to progress patient with RTB for Horizontal abd today to increase overall challenge  Tolerated treatment fair  Patient exhibited good technique with therapeutic exercises and would benefit from continued PT  Confirmed with patient having the appropriate TB for at home to continue with her HEP  Re-educated with DOMS due to minimal progressions today  Plan: Continue per plan of care  plan to make more appointments but holding til after follow up with MD on Wednesday  Precautions: standard    Manuals 4/22 4/26  3/22 3/25 4/5 4/8 4/12 4/15 4/19   Right shoulder PROM GR SC  GR LH GR  SC GR GR   Gr  II-IV Inf, AP GHJ mobs GR GR    GR LH GR  GR GR GR   Right posterior capsule str   GR SC    GR   GR GR   Reassessment       GR      Neuro Re-Ed                                                                                                        Ther Ex     3/25        UBE 3' / 3' 4'/4'  3' / 3' 3'/3' 3' / 3' 3' / 3' skipped today  3' / 3' 3' / 3'   Pulleys 30x5"   30x5" 30x5" 5 min  5 min  20x5" 20x5"   Finger ladder: forward only             Supine shoulder flexion AAROM with cane             Supine shoulder ER AAROM with cane Sleeper str  15x10" 10x10" 15x10"       Table slides (FF, scaption, ER)    D/C ---        IR with cane    20x5" 20x5" 13x5"       ER - s/l    2x10 2# 2x10 2#        Prone row    2x10 2# 3" 2x10 2#        Webslide: L rows    RTB 2x10 3" RTB 2x10x3" inc       TB ER    RTB 2x10 RTB 2x10        Bilateral shoulder ER with scapular retraction    RTB 2x10 5" RTB 2x10x5"     RTB 2x10 5"   ER str  At 90/90 against wall    10x10" 10x10"        Supine posterior capsule str  15x10"       Patient education / HEP review       GR      Prone shoulder ext, h abd, scaption       NV 2x 10  2x10 3" ea  10x3" 2# ea  Scaption wall slides with TB       NV 2x 10 RTB  2x10 RTB    Multidirectional ball stability on wall (V, H, CW/CCW circles) GMB 20x ea  GMB 20x        Green 20x ea  Green 20x ea     PNF D2 UE flex with TB YTB 2x10 YTb 2x 10            Standing horizontal abduction with TB YTB 2x10 RTB 2x 10            Ther Activity                                       Gait Training                                       Modalities

## 2021-04-28 ENCOUNTER — OFFICE VISIT (OUTPATIENT)
Dept: OBGYN CLINIC | Facility: CLINIC | Age: 46
End: 2021-04-28
Payer: COMMERCIAL

## 2021-04-28 VITALS
BODY MASS INDEX: 35.51 KG/M2 | HEART RATE: 74 BPM | SYSTOLIC BLOOD PRESSURE: 166 MMHG | HEIGHT: 62 IN | DIASTOLIC BLOOD PRESSURE: 83 MMHG | WEIGHT: 193 LBS

## 2021-04-28 DIAGNOSIS — S46.001D ROTATOR CUFF INJURY, RIGHT, SUBSEQUENT ENCOUNTER: Primary | ICD-10-CM

## 2021-04-28 PROCEDURE — 99213 OFFICE O/P EST LOW 20 MIN: CPT | Performed by: FAMILY MEDICINE

## 2021-04-28 PROCEDURE — 3008F BODY MASS INDEX DOCD: CPT | Performed by: FAMILY MEDICINE

## 2021-04-28 PROCEDURE — 1036F TOBACCO NON-USER: CPT | Performed by: FAMILY MEDICINE

## 2021-04-28 NOTE — PROGRESS NOTES
Assessment/Plan:  Assessment/Plan   Diagnoses and all orders for this visit:    Rotator cuff injury, right, subsequent encounter  -     MRI shoulder right wo contrast; Future        42year-old right-dominant female with right shoulder pain more than 8 months duration  Discussed with patient physical exam, impression and plan  Physical exam noted for tenderness at the posterior lateral aspect of shoulder  She has range of motion limited to forward flexion of 150°, abduction 150°, and internal rotation to the lateral hip  She has 4+/5 strength external rotation  There is negative empty can test   There is positive [place] maneuver  She more than 8 months since onset of symptoms and still symptomatic despite conservative management of corticosteroid injection 10/05/2020, formal physical therapy and home exercises since 03/11/2021  At this time I will refer for MRI of the right shoulder to evaluate for rotator cuff tear and impingement as more invasive management may be warranted  She will follow up after getting MRI done  Subjective:   Patient ID: David Grullon is a 42 y o  female  Chief Complaint   Patient presents with    Right Shoulder - Follow-up, Pain       73-year-old right-hand-dominant female following up for right shoulder pain of more than 8 months duration  She was last seen by me more than 6 months ago which point she received corticosteroid injection to the right shoulder and she was referred to physical therapy  She has been attending physical therapy and doing home exercises since 03/11/2021  She reports that pain at the scapular region has improved but she still experiencing pain described as localized mainly to the posterior lateral aspect the shoulder, radiating distally along the posterior lateral aspect the upper arm to the elbow, worse with reaching behind back, worse with elevating above shoulder level, associated limited range of motion, and improved with resting    She has been using electrical stimulation unit  Shoulder Pain  This is a new problem  The current episode started more than 1 month ago  The problem occurs daily  The problem has been waxing and waning  Associated symptoms include arthralgias  Pertinent negatives include no joint swelling, numbness or weakness  Exacerbated by: Arm use  She has tried rest, acetaminophen and position changes (Corticosteroid injection, physical therapy, home exercise, electrical stimulation) for the symptoms  The treatment provided mild relief  Review of Systems   Musculoskeletal: Positive for arthralgias  Negative for joint swelling  Neurological: Negative for weakness and numbness  Objective:  Vitals:    04/28/21 1139   BP: 166/83   Pulse: 74   Weight: 87 5 kg (193 lb)   Height: 5' 2" (1 575 m)     Right Shoulder Exam     Tenderness   Right shoulder tenderness location: Posterior lateral, trapezius  Range of Motion   Active abduction: 150   Forward flexion: 150   Right shoulder internal rotation 0 degrees: Lateral hip  Muscle Strength   Right shoulder normal muscle strength: 4+/5 external rotation  Abduction: 5/5   Internal rotation: 5/5   Supraspinatus: 5/5     Tests   [place] test: positive    Comments:  Negative empty can  Negative belly press  Negative push-off            Physical Exam  Vitals signs and nursing note reviewed  Constitutional:       General: She is not in acute distress  Appearance: She is well-developed  HENT:      Head: Normocephalic  Right Ear: External ear normal       Left Ear: External ear normal    Eyes:      Conjunctiva/sclera: Conjunctivae normal    Neck:      Trachea: No tracheal deviation  Cardiovascular:      Rate and Rhythm: Normal rate  Pulmonary:      Effort: Pulmonary effort is normal  No respiratory distress  Abdominal:      General: There is no distension  Musculoskeletal:         General: Tenderness present  Skin:     General: Skin is warm and dry  Neurological:      Mental Status: She is alert and oriented to person, place, and time     Psychiatric:         Behavior: Behavior normal

## 2021-05-04 ENCOUNTER — HOSPITAL ENCOUNTER (OUTPATIENT)
Dept: MRI IMAGING | Facility: HOSPITAL | Age: 46
Discharge: HOME | End: 2021-05-04
Attending: FAMILY MEDICINE
Payer: COMMERCIAL

## 2021-05-04 DIAGNOSIS — S46.001D ROTATOR CUFF INJURY, RIGHT, SUBSEQUENT ENCOUNTER: ICD-10-CM

## 2021-05-04 PROCEDURE — 73221 MRI JOINT UPR EXTREM W/O DYE: CPT

## 2021-05-04 PROCEDURE — G1004 CDSM NDSC: HCPCS

## 2021-05-07 ENCOUNTER — OFFICE VISIT (OUTPATIENT)
Dept: OBGYN CLINIC | Facility: CLINIC | Age: 46
End: 2021-05-07
Payer: COMMERCIAL

## 2021-05-07 VITALS
DIASTOLIC BLOOD PRESSURE: 74 MMHG | WEIGHT: 192 LBS | HEIGHT: 62 IN | BODY MASS INDEX: 35.33 KG/M2 | HEART RATE: 86 BPM | SYSTOLIC BLOOD PRESSURE: 107 MMHG

## 2021-05-07 DIAGNOSIS — M67.911 TENDINOPATHY OF RIGHT ROTATOR CUFF: ICD-10-CM

## 2021-05-07 DIAGNOSIS — M67.819 TENDINOSIS OF ROTATOR CUFF: ICD-10-CM

## 2021-05-07 DIAGNOSIS — M75.51 SUBACROMIAL BURSITIS OF RIGHT SHOULDER JOINT: Primary | ICD-10-CM

## 2021-05-07 DIAGNOSIS — M67.813 BICEPS TENDINOSIS OF RIGHT SHOULDER: ICD-10-CM

## 2021-05-07 DIAGNOSIS — M75.41 SUBACROMIAL IMPINGEMENT OF RIGHT SHOULDER: ICD-10-CM

## 2021-05-07 PROCEDURE — 99213 OFFICE O/P EST LOW 20 MIN: CPT | Performed by: FAMILY MEDICINE

## 2021-05-07 NOTE — PROGRESS NOTES
Assessment/Plan:  Assessment/Plan   Diagnoses and all orders for this visit:    Subacromial bursitis of right shoulder joint    Subacromial impingement of right shoulder    Tendinosis of rotator cuff    Tendinopathy of right rotator cuff    Biceps tendinosis of right shoulder          47-year-old right-dominant female right shoulder pain of more than 8 months duration  Discussed with patient MRI results, impression and plan  MRI of the right shoulder noted for tendinosis of supraspinatus with mild partial articular surface tear, mild subacromial/subdeltoid bursitis, mild tendinosis long head of the biceps,  Arthritic changes of the AC joint  On physical exam she has tenderness at the posterior lateral aspect the shoulder and mild tenderness at the coracoid process  She has range of motion limited to forward flexion of 160, abduction 150, and internal rotation to the sacrum  She has 4+/5 strength external rotation  There is negative empty can test and mild pain with [place] maneuver  Clinical impression that she may be symptomatic from subacromial bursitis and rotator cuff tendinopathy  She may benefit from repeat corticosteroid injection, however recommend ultrasound guidance  I will refer to my sports medicine colleague for ultrasound-guided corticosteroid injection  She will follow up with me afterward  Subjective:   Patient ID: Yanelis Kevin is a 42 y o  female  Chief Complaint   Patient presents with    Right Shoulder - Follow-up        47-year-old right-hand-dominant female following up for right shoulder pain of more than 8 months duration  She was last seen by me 9 days ago at which point she was referred for MRI of the right shoulder    She has been experiencing pain described as generalized to the shoulder but worse at the posterior lateral aspect of the shoulder, radiating distally along the lateral aspect the upper arm to the elbow, worse with reaching behind her back and abducting the arm and reaching above shoulder level, associated with limited range of motion, and improved with resting  Shoulder Pain  This is a new problem  The current episode started more than 1 month ago  The problem occurs daily  The problem has been unchanged  Associated symptoms include arthralgias  Pertinent negatives include no joint swelling, numbness or weakness  Exacerbated by: Arm elevation  She has tried rest and NSAIDs (Physical therapy, home exercise, corticosteroid injection, electrical stimulation) for the symptoms  The treatment provided mild relief  Review of Systems   Musculoskeletal: Positive for arthralgias  Negative for joint swelling  Neurological: Negative for weakness and numbness  Objective:  Vitals:    05/07/21 1104   BP: 107/74   Pulse: 86   Weight: 87 1 kg (192 lb)   Height: 5' 2" (1 575 m)     Right Shoulder Exam     Tenderness   Right shoulder tenderness location: Lateral, coracoid process  Range of Motion   Active abduction: 150   Forward flexion: 160   Internal rotation 0 degrees: Lumbar     Muscle Strength   Right shoulder normal muscle strength: 4+/5 external rotation  Abduction: 5/5   Internal rotation: 5/5   Supraspinatus: 5/5     Tests   [place] test: positive (  Mild)    Comments:   Negative empty can test  Negative belly press            Physical Exam  Vitals signs and nursing note reviewed  Constitutional:       General: She is not in acute distress  Appearance: She is well-developed  HENT:      Head: Normocephalic  Right Ear: External ear normal       Left Ear: External ear normal    Eyes:      Conjunctiva/sclera: Conjunctivae normal    Neck:      Trachea: No tracheal deviation  Cardiovascular:      Rate and Rhythm: Normal rate  Pulmonary:      Effort: Pulmonary effort is normal  No respiratory distress  Abdominal:      General: There is no distension  Musculoskeletal:         General: Tenderness present     Skin:     General: Skin is warm and dry  Neurological:      Mental Status: She is alert and oriented to person, place, and time  Psychiatric:         Behavior: Behavior normal          I have personally reviewed pertinent films in PACS and my interpretation is Subacromial bursitis

## 2021-05-11 ENCOUNTER — CONSULT (OUTPATIENT)
Dept: CARDIOLOGY CLINIC | Facility: CLINIC | Age: 46
End: 2021-05-11
Payer: COMMERCIAL

## 2021-05-11 VITALS
HEART RATE: 83 BPM | SYSTOLIC BLOOD PRESSURE: 108 MMHG | BODY MASS INDEX: 35.15 KG/M2 | DIASTOLIC BLOOD PRESSURE: 70 MMHG | WEIGHT: 191 LBS | HEIGHT: 62 IN | OXYGEN SATURATION: 98 %

## 2021-05-11 DIAGNOSIS — R07.89 ATYPICAL CHEST PAIN: Primary | ICD-10-CM

## 2021-05-11 PROCEDURE — 93000 ELECTROCARDIOGRAM COMPLETE: CPT | Performed by: INTERNAL MEDICINE

## 2021-05-11 PROCEDURE — 99243 OFF/OP CNSLTJ NEW/EST LOW 30: CPT | Performed by: INTERNAL MEDICINE

## 2021-05-11 NOTE — PROGRESS NOTES
Cardiology Consultation     Rusty Pramod  [id number]  1975  [facility] [address]  [facility] [place] [address]    1  Atypical chest pain  Ambulatory referral to Cardiology    POCT ECG    Lipid Panel with Direct LDL reflex   2  BMI 34 0-34 9,adult         Chief Complaint:   chest pain    HPI:   72-year-old female with migraine, GERD /hiatal hernia was referred from primary care physician's office for evaluation of chest pain   patient complains of chest tightness more than 6 months and reproducible on taking deep breath and on palpation  As per patient her symptoms started after she had EGD  Chest pain resolved after few weeks and she did not have any chest pain for more than last 6 month  she denies shortness of breath, palpitation, dizziness, orthopnea, leg edema, paroxysmal nocturnal dyspnea or loss of consciousness   no chest pain or shortness of breath on exertion  Exercise tolerance is unlimited     patient denies personal history of hypertension, diabetes, hyperlipidemia myocardial infarction, TIA/CVA, heart failure, cardiac arrhythmia or peripheral vascular disease    Social History:  Denies smoking, alcohol intake or illicit drug use  Family History: mother has hypertension and diabetes but no family history of coronary artery disease     current medications reviewed   no recent labs available for review  Labs from February 2020 reviewed    Creatinine 0 76, AST ALT within normal limits   hemoglobin 14 1    Review of Systems:   all review of system negative except as mentioned above    Patient Active Problem List   Diagnosis    Gastroesophageal reflux disease without esophagitis    Family history of breast cancer    Migraine    Epigastric pain    Patient desires pregnancy    Urine pregnancy test negative    Missed menses    Encounter for gynecological examination (general) (routine) without abnormal findings    Screening for breast cancer    Inappropriate change in quantitative hCG in early pregnancy    Pregnancy of unknown anatomic location    Acute pain of right shoulder    Nausea     Past Medical History:   Diagnosis Date    Acid reflux disease     Allergic rhinitis, seasonal     Asthma     Hiatal hernia     Migraine     Ulcer of gastroesophageal junction      Social History     Socioeconomic History    Marital status: /Civil Union     Spouse name: Not on file    Number of children: Not on file    Years of education: Not on file    Highest education level: Not on file   Occupational History    Not on file   Social Needs    Financial resource strain: Not on file    Food insecurity     Worry: Not on file     Inability: Not on file   [employer] needs     Medical: Not on file     Non-medical: Not on file   Tobacco Use    Smoking status: Never Smoker    Smokeless tobacco: Never Used   Substance and Sexual Activity    Alcohol use: Yes     Comment: social    Drug use: No    Sexual activity: Yes     Partners: Male     Birth control/protection: None     Comment:    Lifestyle    Physical activity     Days per week: Not on file     Minutes per session: Not on file    Stress: Not on file   Relationships    Social connections     Talks on phone: Not on file     Gets together: Not on file     Attends Shinto service: Not on file     Active member of club or organization: Not on file     Attends meetings of clubs or organizations: Not on file     Relationship status: Not on file    Intimate partner violence     Fear of current or ex partner: Not on file     Emotionally abused: Not on file     Physically abused: Not on file     Forced sexual activity: Not on file   Other Topics Concern    Not on file   Social History Narrative    Not on file      Family History   Problem Relation Age of Onset    Anxiety disorder Mother     Hyperlipidemia Mother     Anxiety disorder Sister     No Known Problems Brother     Anxiety disorder Daughter     Asthma Son    [employer] Diabetes Maternal Grandmother     No Known Problems Maternal Grandfather     No Known Problems Paternal Grandmother     No Known Problems Paternal Grandfather     No Known Problems Son     No Known Problems Brother     Anxiety disorder Brother     No Known Problems Brother      Past Surgical History:   Procedure Laterality Date    [place] EGD TRANSORAL BIOPSY SINGLE/MULTIPLE N/A 2/5/2019    Procedure: ESOPHAGOGASTRODUODENOSCOPY (EGD); Surgeon: Larisa Wilcox DO;  Location: [facility]; Service: Gastroenterology    WISDOM TOOTH EXTRACTION         Current Outpatient Medications:     famotidine (PEPCID) 20 mg tablet, TAKE 1 TABLET BY MOUTH TWICE A DAY, Disp: 60 tablet, Rfl: 0    pantoprazole (PROTONIX) 40 mg tablet, Take 1 tablet (40 mg total) by mouth daily, Disp: 90 tablet, Rfl: 3    SUMAtriptan (IMITREX) 50 mg tablet, TAKE 1 TABLET (50 MG TOTAL) BY MOUTH ONCE AS NEEDED FOR MIGRAINE FOR UP TO 1 DOSE, Disp: 9 tablet, Rfl: 0    tiZANidine (ZANAFLEX) 4 mg tablet, Take 4 mg by mouth every 8 (eight) hours as needed for muscle spasms, Disp: , Rfl:   No Known Allergies  Vitals:    05/11/21 0958   BP: 108/70   BP Location: Left arm   Patient Position: Sitting   Cuff Size: Standard   Pulse: 83   SpO2: 98%   Weight: 86 6 kg (191 lb)   Height: 5' 2" (1 575 m)         Labs:   Annual Exam on 03/09/2021   Component Date Value    Case Report 03/09/2021                      Value:Gynecologic Cytology Report                       Case: [id number]                                  Authorizing Provider:  Dario Abdi MD         Collected:           03/09/2021 1037              Ordering Location:     [facility]     Received:            03/09/2021 [address] First Screen:          Remi Harris, CT                                                         Specimen:    LIQUID-BASED PAP, SCREENING, Cervix                                                        Primary Interpretation 03/09/2021 Negative for intraepithelial lesion or malignancy     Specimen Adequacy 03/09/2021 Satisfactory for evaluation  Endocervical/transformation zone component present   Additional Information 03/09/2021                      Value: This result contains rich text formatting which cannot be displayed here   HPV Other HR 03/09/2021 Negative     HPV16 03/09/2021 Negative     HPV18 03/09/2021 Negative    Appointment on 12/10/2020   Component Date Value    Fecal Occult Blood Diagn* 12/10/2020 Negative     Fecal Occult Blood Diagn* 12/10/2020 Negative     Fecal Occult Blood Diagn* 12/10/2020 Negative    Lab on 12/07/2020   Component Date Value    WBC 12/07/2020 7 72     RBC 12/07/2020 4 86     Hemoglobin 12/07/2020 14 1     Hematocrit 12/07/2020 45 2     MCV 12/07/2020 93     MCH 12/07/2020 29 0     MCHC 12/07/2020 31 2*    RDW 12/07/2020 13 1     MPV 12/07/2020 10 0     Platelets 85/01/5786 272     nRBC 12/07/2020 0     Neutrophils Relative 12/07/2020 62     Immat GRANS % 12/07/2020 1     Lymphocytes Relative 12/07/2020 27     Monocytes Relative 12/07/2020 6     Eosinophils Relative 12/07/2020 3     Basophils Relative 12/07/2020 1     Neutrophils Absolute 12/07/2020 4 82     Immature Grans Absolute 12/07/2020 0 04     Lymphocytes Absolute 12/07/2020 2 11     Monocytes Absolute 12/07/2020 0 47     Eosinophils Absolute 12/07/2020 0 21     Basophils Absolute 12/07/2020 0 07      Imaging: Mri Shoulder Right Wo Contrast    Result Date: 5/4/2021  Narrative: MRI RIGHT SHOULDER INDICATION:   S46 001D: Unspecified injury of muscle(s) and tendon(s) of the rotator cuff of right shoulder, subsequent encounter   COMPARISON: None  TECHNIQUE:   The following MR sequences were obtained of the right shoulder: Localizer, axial GRE/PD fat sat, oblique coronal T2 fat sat, oblique sagittal T1/T2 fat sat  Gadolinium was not used  FINDINGS: SUBCUTANEOUS TISSUES: Normal JOINT EFFUSION: None  ACROMION PROCESS: Type I acromion seen ROTATOR CUFF: There is no full-thickness rotator cuff tear seen  There is tendinosis of the supraspinatus with mild partial articular surface tearing of the posterior aspect of the supraspinatus Subscapularis is intact SUBACROMIAL/SUBDELTOID BURSA: Mild inflammatory changes in the subacromial subdeltoid bursa LONG HEAD OF BICEPS TENDON: Normal   Biceps tendon is intact  Minimal tendinosis of the intra-articular portion of the long head of the biceps tendon GLENOID LABRUM: No labral detachment mild chronic degenerative changes are seen in the anterosuperior labrum GLENOHUMERAL JOINT: No full-thickness or partial thickness articular cartilage defect and no significant degenerative changes ACROMIOCLAVICULAR JOINT:  Arthritic changes seen in the [facility] joint BONES: Bone contusion No bone marrow edema     Impression: No full-thickness rotator cuff tear seen Tendinosis supraspinatus with mild partial articular surface tearing   Mild subacromial subdeltoid bursitis Intact subscapularis Mild tendinosis intra-articular portion of long head of the biceps tendon No labral detachment or tearing Workstation performed: [id number]         Physical Exam:  General:   obese, awake, alert and oriented x3, not in distress  Neck: supple, no JVD  Eyes: PERRL, conjunctiva normal  Lungs:  Bilateral air entry positive, no wheeze/rhonchi or crackle  Heart:  S1-S2 normal, no murmur  Abdomen:  Soft ,nondistended ,nontender, bowel sounds positive  Extremities:  No leg edema, no deformity, ROM normal  Neuro:  Moving all extremities, speech clear  Skin: warm, no rash    /70 (BP Location: Left arm, Patient Position: Sitting, Cuff Size: Standard) Pulse 83   Ht 5' 2" (1 575 m)   Wt 86 6 kg (191 lb)   SpO2 98%   BMI 34 93 kg/m²       Cardiographics :  ECG:  Sinus rhythm, normal axis      Assessment:    1  Atypical chest pain   patient did not have any chest pain for last 6 months  Exercise tolerance is unlimited  2   Migraine  3  GERD    Recommendations:     patient chest pain is atypical and she is pain free for more than 6 months  She does not have lot of cardiac risk factor  Her exercise tolerance is unlimited   she does not need any cardiac workup at present time  patient was advised to call us back if she experiences chest pain or shortness of breath or as needed then she will require further cardiac workup     lipid panel ordered   she was advised to try to lose weight     return to clinic in 1 year or early if needed  Above all discussed with patient    Patient understands and agrees

## 2021-05-25 NOTE — PROGRESS NOTES
Huyen Magana has attended a total of 12 physical therapy appointments to date  Patient was last treated on 4/26 and has no remaining appointments scheduled  Goals, objective and subjective information unable to be updated at this time  Patient will be discharged from physical therapy secondary to inactivity

## 2021-05-27 ENCOUNTER — TELEPHONE (OUTPATIENT)
Dept: OBGYN CLINIC | Facility: CLINIC | Age: 46
End: 2021-05-27

## 2021-06-01 ENCOUNTER — OFFICE VISIT (OUTPATIENT)
Dept: FAMILY MEDICINE CLINIC | Facility: CLINIC | Age: 46
End: 2021-06-01
Payer: COMMERCIAL

## 2021-06-01 VITALS
HEIGHT: 62 IN | TEMPERATURE: 97.8 F | BODY MASS INDEX: 35.48 KG/M2 | DIASTOLIC BLOOD PRESSURE: 78 MMHG | SYSTOLIC BLOOD PRESSURE: 112 MMHG | HEART RATE: 76 BPM | OXYGEN SATURATION: 98 % | WEIGHT: 192.8 LBS

## 2021-06-01 DIAGNOSIS — Z02.89 ENCOUNTER FOR PHYSICAL EXAMINATION OF PROSPECTIVE FOSTER PARENT: Primary | ICD-10-CM

## 2021-06-01 PROBLEM — R11.0 NAUSEA: Status: RESOLVED | Noted: 2020-12-04 | Resolved: 2021-06-01

## 2021-06-01 PROBLEM — R10.13 EPIGASTRIC PAIN: Status: RESOLVED | Noted: 2019-01-25 | Resolved: 2021-06-01

## 2021-06-01 PROBLEM — M25.511 ACUTE PAIN OF RIGHT SHOULDER: Status: RESOLVED | Noted: 2020-08-13 | Resolved: 2021-06-01

## 2021-06-01 PROCEDURE — 3725F SCREEN DEPRESSION PERFORMED: CPT | Performed by: FAMILY MEDICINE

## 2021-06-01 PROCEDURE — 99396 PREV VISIT EST AGE 40-64: CPT | Performed by: FAMILY MEDICINE

## 2021-06-01 NOTE — PROGRESS NOTES
BMI Counseling: Body mass index is 35 26 kg/m²  The BMI is above normal  Nutrition recommendations include reducing portion sizes, decreasing overall calorie intake and 3-5 servings of fruits/vegetables daily  Exercise recommendations include exercising 3-5 times per week

## 2021-06-01 NOTE — PROGRESS NOTES
Assessment/Plan:    No problem-specific Assessment & Plan notes found for this encounter  Diagnoses and all orders for this visit:    Encounter for physical examination of prospective   Normal exam     Follow up as needed        Subjective:      Patient ID: Luda Storm is a 42 y o  female  Patient is here for a physical to become a   She denies any communicable diseases today  denies any emotional problems including anxiety or depression  No complaints today  The following portions of the patient's history were reviewed and updated as appropriate:   She  has a past medical history of Acid reflux disease, Allergic rhinitis, seasonal, Asthma, Hiatal hernia, Migraine, and Ulcer of gastroesophageal junction  She   Patient Active Problem List    Diagnosis Date Noted    Encounter for physical examination of prospective  06/01/2021    Pregnancy of unknown anatomic location 02/25/2020    Inappropriate change in quantitative hCG in early pregnancy 02/16/2020    Encounter for gynecological examination (general) (routine) without abnormal findings 11/22/2019    Screening for breast cancer 11/22/2019    Patient desires pregnancy 11/08/2019    Urine pregnancy test negative 11/08/2019    Missed menses 11/08/2019    Migraine 10/18/2018    Family history of breast cancer 10/05/2018    Gastroesophageal reflux disease without esophagitis 08/31/2018     She  has a past surgical history that includes [place] tooth extraction and pr egd transoral biopsy single/multiple (N/A, 2/5/2019)  Her family history includes Anxiety disorder in her brother, daughter, mother, and sister; Asthma in her son; Diabetes in her maternal grandmother; Hyperlipidemia in her mother; No Known Problems in her brother, brother, brother, maternal grandfather, paternal grandfather, paternal grandmother, and son  She  reports that she has never smoked   She has never used smokeless tobacco  She reports current alcohol use  She reports that she does not use drugs  Current Outpatient Medications   Medication Sig Dispense Refill    famotidine (PEPCID) 20 mg tablet TAKE 1 TABLET BY MOUTH TWICE A DAY 60 tablet 0    pantoprazole (PROTONIX) 40 mg tablet Take 1 tablet (40 mg total) by mouth daily 90 tablet 3    SUMAtriptan (IMITREX) 50 mg tablet TAKE 1 TABLET (50 MG TOTAL) BY MOUTH ONCE AS NEEDED FOR MIGRAINE FOR UP TO 1 DOSE 9 tablet 0    tiZANidine (ZANAFLEX) 4 mg tablet Take 4 mg by mouth every 8 (eight) hours as needed for muscle spasms       No current facility-administered medications for this visit  Current Outpatient Medications on File Prior to Visit   Medication Sig    famotidine (PEPCID) 20 mg tablet TAKE 1 TABLET BY MOUTH TWICE A DAY    pantoprazole (PROTONIX) 40 mg tablet Take 1 tablet (40 mg total) by mouth daily    SUMAtriptan (IMITREX) 50 mg tablet TAKE 1 TABLET (50 MG TOTAL) BY MOUTH ONCE AS NEEDED FOR MIGRAINE FOR UP TO 1 DOSE    tiZANidine (ZANAFLEX) 4 mg tablet Take 4 mg by mouth every 8 (eight) hours as needed for muscle spasms     No current facility-administered medications on file prior to visit  She has No Known Allergies       Review of Systems   Constitutional: Negative for activity change, appetite change, fatigue and fever  HENT: Negative for congestion and ear discharge  Respiratory: Negative for cough and shortness of breath  Cardiovascular: Negative for chest pain and palpitations  Gastrointestinal: Negative for diarrhea and nausea  Musculoskeletal: Negative for arthralgias and back pain  Skin: Negative for color change and rash  Neurological: Negative for dizziness and headaches  Psychiatric/Behavioral: Negative for agitation and behavioral problems           Objective:      /78   Pulse 76   Temp 97 8 °F (36 6 °C) (Temporal)   Ht 5' 2" (1 575 m)   Wt 87 5 kg (192 lb 12 8 oz)   SpO2 98%   BMI 35 26 kg/m²          Physical Exam  Constitutional:       General: She is not in acute distress  Appearance: She is well-developed  She is not diaphoretic  HENT:      Head: Normocephalic and atraumatic  Nose: Nose normal    Eyes:      Conjunctiva/sclera: Conjunctivae normal       Pupils: Pupils are equal, round, and reactive to light  Cardiovascular:      Rate and Rhythm: Normal rate and regular rhythm  Heart sounds: Normal heart sounds  No murmur  Pulmonary:      Effort: Pulmonary effort is normal  No respiratory distress  Breath sounds: Normal breath sounds  No wheezing  Abdominal:      General: Bowel sounds are normal  There is no distension  Palpations: Abdomen is soft  Tenderness: There is no abdominal tenderness  Skin:     General: Skin is warm and dry  Findings: No erythema or rash  Neurological:      Mental Status: She is alert and oriented to person, place, and time

## 2021-06-10 ENCOUNTER — OFFICE VISIT (OUTPATIENT)
Dept: OBGYN CLINIC | Facility: MEDICAL CENTER | Age: 46
End: 2021-06-10
Payer: COMMERCIAL

## 2021-06-10 VITALS
BODY MASS INDEX: 35.33 KG/M2 | SYSTOLIC BLOOD PRESSURE: 102 MMHG | HEART RATE: 71 BPM | HEIGHT: 62 IN | DIASTOLIC BLOOD PRESSURE: 74 MMHG | WEIGHT: 192 LBS | RESPIRATION RATE: 17 BRPM

## 2021-06-10 DIAGNOSIS — M75.51 SUBACROMIAL BURSITIS OF RIGHT SHOULDER JOINT: ICD-10-CM

## 2021-06-10 DIAGNOSIS — M67.911 TENDINOPATHY OF ROTATOR CUFF, RIGHT: Primary | ICD-10-CM

## 2021-06-10 PROCEDURE — 20611 DRAIN/INJ JOINT/BURSA W/US: CPT | Performed by: PHYSICAL MEDICINE & REHABILITATION

## 2021-06-10 PROCEDURE — 99214 OFFICE O/P EST MOD 30 MIN: CPT | Performed by: PHYSICAL MEDICINE & REHABILITATION

## 2021-06-10 PROCEDURE — 1036F TOBACCO NON-USER: CPT | Performed by: PHYSICAL MEDICINE & REHABILITATION

## 2021-06-10 PROCEDURE — 3008F BODY MASS INDEX DOCD: CPT | Performed by: PHYSICAL MEDICINE & REHABILITATION

## 2021-06-10 RX ORDER — TRIAMCINOLONE ACETONIDE 40 MG/ML
40 INJECTION, SUSPENSION INTRA-ARTICULAR; INTRAMUSCULAR
Status: COMPLETED | OUTPATIENT
Start: 2021-06-10 | End: 2021-06-10

## 2021-06-10 RX ORDER — LIDOCAINE HYDROCHLORIDE 10 MG/ML
3 INJECTION, SOLUTION INFILTRATION; PERINEURAL
Status: COMPLETED | OUTPATIENT
Start: 2021-06-10 | End: 2021-06-10

## 2021-06-10 RX ADMIN — TRIAMCINOLONE ACETONIDE 40 MG: 40 INJECTION, SUSPENSION INTRA-ARTICULAR; INTRAMUSCULAR at 12:16

## 2021-06-10 RX ADMIN — LIDOCAINE HYDROCHLORIDE 3 ML: 10 INJECTION, SOLUTION INFILTRATION; PERINEURAL at 12:16

## 2021-06-10 NOTE — PROGRESS NOTES
1  Tendinopathy of rotator cuff, right     2  Subacromial bursitis of right shoulder joint       Orders Placed This Encounter   Procedures    Large joint arthrocentesis        Impression: This is a patient referred by Dr Holt Parents' with rotator cuff tendinopathy/subacromial bursitis and biceps tendinopathy  The patient is a good candidate for US guided subacromial space steroid injection as she did not have a great response to an anatomically guided injection  Please see procedure note below  Patient tolerated the procedure and had immediate relief of symptoms  Patient is already scheduled to see Dr Holt Parents in two weeks  Can consider USGI of bicep tendon sheath if still having pain  Imaging Studies (I personally reviewed images in PACS and report):  Right shoulder MRI dated 5/4/2021:   "No full-thickness rotator cuff tear seen  Tendinosis supraspinatus with mild partial articular surface tearing  Mild subacromial subdeltoid bursitis  Intact subscapularis  Mild tendinosis intra-articular portion of long head of the biceps tendon      No labral detachment or tearing "    No follow-ups on file  HPI:  Kiera Garcia is a 42 y o  female  who presents for evaluation of   Chief Complaint   Patient presents with    Right Shoulder - Pain     Onset/Mechanism: Chronic pain for over 10 months  Location: Anterolateral shoulder  Radiation: Down the arm but not past the elbow  Provocative: Painting right now which hurts it a lot  Severity: Depends but hurts a lot  Associated Symptoms: Trouble sleeping on her side  Treatment so far: Steroid injection in the past     Review of Systems   Constitutional: Positive for activity change  Negative for fever  HENT: Negative for sore throat  Eyes: Negative for visual disturbance  Respiratory: Negative for shortness of breath  Cardiovascular: Negative for chest pain  Gastrointestinal: Negative for abdominal pain  Endocrine: Negative for polydipsia  Genitourinary: Negative for difficulty urinating  Musculoskeletal: Positive for arthralgias  Skin: Negative for rash  Allergic/Immunologic: Negative for immunocompromised state  Neurological: Negative for numbness  Hematological: Does not bruise/bleed easily  Psychiatric/Behavioral: Negative for confusion  Following history reviewed and updated:  Past Medical History:   Diagnosis Date    Acid reflux disease     Allergic rhinitis, seasonal     Asthma     Hiatal hernia     Migraine     Ulcer of gastroesophageal junction      Past Surgical History:   Procedure Laterality Date    [place] EGD TRANSORAL BIOPSY SINGLE/MULTIPLE N/A 2/5/2019    Procedure: ESOPHAGOGASTRODUODENOSCOPY (EGD); Surgeon: Elvin Gutierrez DO;  Location: [facility]; Service: Gastroenterology    WISDOM TOOTH EXTRACTION       Social History   Social History     Substance and Sexual Activity   Alcohol Use Yes    Comment: social     Social History     Substance and Sexual Activity   Drug Use No     Social History     Tobacco Use   Smoking Status Never Smoker   Smokeless Tobacco Never Used     Family History   Problem Relation Age of Onset    Anxiety disorder Mother     Hyperlipidemia Mother     Anxiety disorder Sister     No Known Problems Brother     Anxiety disorder Daughter     Asthma Son     Diabetes Maternal Grandmother     No Known Problems Maternal Grandfather     No Known Problems Paternal Grandmother     No Known Problems Paternal Grandfather     No Known Problems Son     No Known Problems Brother     Anxiety disorder Brother     No Known Problems Brother      No Known Allergies     Constitutional:  /74 (BP Location: Left arm, Patient Position: Sitting)   Pulse 71   Resp 17   Ht 5' 2" (1 575 m)   Wt 87 1 kg (192 lb)   BMI 35 12 kg/m²    General: NAD  Eyes: Anicteric sclerae  Neck: Supple  Lungs: Unlabored breathing  Cardiovascular: No lower extremity edema    Skin: Intact without erythema  Neurologic: Sensation intact to light touch  Psychiatric: Mood and affect are appropriate  Right Shoulder Exam     Tenderness   The patient is experiencing tenderness in the acromion and biceps tendon  Range of Motion   Active abduction:  150 abnormal   Passive abduction: normal   External rotation: abnormal   Forward flexion:  160 abnormal     Tests   [place] test: positive  Impingement: positive  Sulcus: absent    Other   Erythema: absent  Scars: absent  Sensation: normal  Pulse: present    Comments:  Positive Speed's  Injection site was CDI  Large joint arthrocentesis: R subacromial bursa  Universal Protocol:  Consent given by: patient  Timeout called at: 6/10/2021 12:12 PM   Site marked: the operative site was marked  Supporting Documentation  Indications: pain   Procedure Details  Location: shoulder - R subacromial bursa  Ultrasound guidance: yes (US guidance was used to find the area of interest )  Approach: lateral  Medications administered: 40 mg triamcinolone acetonide 40 mg/mL; 3 mL lidocaine 1 %    Patient tolerance: patient tolerated the procedure well with no immediate complications  Dressing:  Sterile dressing applied    The right subacromial-subdeltoid bursa was visualized with ultrasound and injected with steroid/anesthetic solution as indicated  Prior to the injection, the ultrasound was used to evaluate for any neural or vascular structures  Care was taken to avoid these structures  The images (and video if taken) were saved to the [employer] ultrasound system  Prior to the procedure, the patient was informed of the following risks in layman terms:    - Risk of bleeding since a needle is involved  - Risk of infection (1/10,000 chance as per recent studies)  Signs/symptoms were discussed and they would prompt an urgent evaluation at an emergency department   - Risk of pigmentation or skin dimpling in the skin (2-3% chance as per recent studies) from the steroid    - Risk of increased pain from steroid flare (1% chance as per recent studies) that typically lasts 24-48 hours  - Risk of increased blood sugars from the steroid medication that can last for a few weeks  If the patient is a diabetic or pre-diabetic, they were encouraged to closely monitor their blood sugars and discuss with PCP if elevated more than usual or if having symptoms  After going over these risks, we decided that the benefits outweigh the risks and proceeded with the procedure

## 2021-07-07 ENCOUNTER — OFFICE VISIT (OUTPATIENT)
Dept: OBGYN CLINIC | Facility: CLINIC | Age: 46
End: 2021-07-07
Payer: COMMERCIAL

## 2021-07-07 VITALS
DIASTOLIC BLOOD PRESSURE: 69 MMHG | RESPIRATION RATE: 18 BRPM | HEART RATE: 88 BPM | HEIGHT: 62 IN | BODY MASS INDEX: 35.51 KG/M2 | WEIGHT: 193 LBS | SYSTOLIC BLOOD PRESSURE: 100 MMHG

## 2021-07-07 DIAGNOSIS — M67.813 BICEPS TENDINOSIS OF RIGHT SHOULDER: ICD-10-CM

## 2021-07-07 DIAGNOSIS — M75.51 SUBACROMIAL BURSITIS OF RIGHT SHOULDER JOINT: Primary | ICD-10-CM

## 2021-07-07 DIAGNOSIS — M67.911 TENDINOPATHY OF ROTATOR CUFF, RIGHT: ICD-10-CM

## 2021-07-07 PROCEDURE — 3008F BODY MASS INDEX DOCD: CPT | Performed by: FAMILY MEDICINE

## 2021-07-07 PROCEDURE — 99213 OFFICE O/P EST LOW 20 MIN: CPT | Performed by: FAMILY MEDICINE

## 2021-07-07 PROCEDURE — 1036F TOBACCO NON-USER: CPT | Performed by: FAMILY MEDICINE

## 2021-07-07 NOTE — PROGRESS NOTES
Assessment/Plan:  Assessment/Plan   Diagnoses and all orders for this visit:    Subacromial bursitis of right shoulder joint    Tendinopathy of rotator cuff, right    Biceps tendinosis of right shoulder            57-year-old right-hand-dominant female with right shoulder pain of more than 10 months duration  Discussed with patient physical exam, impression and plan  She has range of motion forward flexion to 160°, abduction 160, and internal rotation to the lumbar spine  She has normal strength of the shoulder  There is mild pain with [place] maneuver and supraspinatus loading/empty can test   Clinical impression is that she has significant improved regard to the inflammatory component of her rotator cuff tendinopathy and subacromial bursitis  I recommend she continue with home exercise program as instructed her by physical therapy  She will follow up as needed  Subjective:   Patient ID: Ravindra Griggs is a 42 y o  female  Chief Complaint   Patient presents with    Right Shoulder - Follow-up       57-year-old right-hand-dominant female following up for right shoulder pain of more than 10 months duration  She was last seen by me 2 months ago at which point MRI reviewed with her was noted for rotator cuff tendinosis, biceps tendinosis, and AC joint arthritic changes  She was recommended for injection  She underwent ultrasound-guided subacromial corticosteroid injection on 06/10/2021  She reports that few days after receiving injection she started have dramatic improvement in symptoms  Been having pain described as localized to the posterior lateral aspect the shoulder, achy and sore, mild intensity, worse with laying on her right side, and improved with changing position  She states that she is able to sleep on her right side and has better range of motion and able to be more physically active  Shoulder Pain  This is a new problem  The current episode started more than 1 month ago   The problem occurs intermittently  The problem has been rapidly improving  Associated symptoms include arthralgias  Pertinent negatives include no joint swelling, numbness or weakness  Exacerbated by: Physical activity  She has tried rest and position changes (Physical therapy, home exercise, corticosteroid injection) for the symptoms  The treatment provided significant relief  Review of Systems   Musculoskeletal: Positive for arthralgias  Negative for joint swelling  Neurological: Negative for weakness and numbness  Objective:  Vitals:    07/07/21 1041   BP: 100/69   Pulse: 88   Resp: 18   Weight: 87 5 kg (193 lb)   Height: 5' 2" (1 575 m)     Right Elbow Exam     Muscle Strength   The patient has normal right elbow strength (5/5 flexion and extension)  Right Shoulder Exam     Range of Motion   Active abduction: 160   Forward flexion: 160   Internal rotation 0 degrees: Lumbar     Muscle Strength   Abduction: 5/5   Internal rotation: 5/5   External rotation: 5/5   Supraspinatus: 5/5     Tests   [place] test: positive (Mild)    Comments:  Mild pain with empty can test   Negative belly press  Negative Yergason's            Physical Exam  Vitals and nursing note reviewed  Constitutional:       General: She is not in acute distress  Appearance: She is well-developed  HENT:      Head: Normocephalic  Right Ear: External ear normal       Left Ear: External ear normal    Eyes:      Conjunctiva/sclera: Conjunctivae normal    Neck:      Trachea: No tracheal deviation  Cardiovascular:      Rate and Rhythm: Normal rate  Pulmonary:      Effort: Pulmonary effort is normal  No respiratory distress  Abdominal:      General: There is no distension  Musculoskeletal:         General: No deformity or signs of injury  Skin:     General: Skin is warm and dry  Neurological:      Mental Status: She is alert and oriented to person, place, and time     Psychiatric:         Behavior: Behavior normal

## 2021-09-13 ENCOUNTER — OFFICE VISIT (OUTPATIENT)
Dept: FAMILY MEDICINE CLINIC | Facility: CLINIC | Age: 46
End: 2021-09-13
Payer: COMMERCIAL

## 2021-09-13 VITALS
TEMPERATURE: 97.1 F | WEIGHT: 194 LBS | SYSTOLIC BLOOD PRESSURE: 117 MMHG | HEART RATE: 74 BPM | OXYGEN SATURATION: 98 % | BODY MASS INDEX: 35.7 KG/M2 | DIASTOLIC BLOOD PRESSURE: 73 MMHG | HEIGHT: 62 IN

## 2021-09-13 DIAGNOSIS — K21.9 GASTROESOPHAGEAL REFLUX DISEASE WITHOUT ESOPHAGITIS: ICD-10-CM

## 2021-09-13 DIAGNOSIS — G43.809 OTHER MIGRAINE WITHOUT STATUS MIGRAINOSUS, NOT INTRACTABLE: Primary | ICD-10-CM

## 2021-09-13 DIAGNOSIS — R11.0 NAUSEA: ICD-10-CM

## 2021-09-13 PROCEDURE — 1036F TOBACCO NON-USER: CPT | Performed by: FAMILY MEDICINE

## 2021-09-13 PROCEDURE — 3008F BODY MASS INDEX DOCD: CPT | Performed by: FAMILY MEDICINE

## 2021-09-13 PROCEDURE — 99214 OFFICE O/P EST MOD 30 MIN: CPT | Performed by: FAMILY MEDICINE

## 2021-09-13 RX ORDER — SUMATRIPTAN 50 MG/1
50 TABLET, FILM COATED ORAL ONCE AS NEEDED
Qty: 9 TABLET | Refills: 3 | Status: SHIPPED | OUTPATIENT
Start: 2021-09-13 | End: 2021-09-14 | Stop reason: ALTCHOICE

## 2021-09-13 RX ORDER — PANTOPRAZOLE SODIUM 40 MG/1
40 TABLET, DELAYED RELEASE ORAL DAILY
Qty: 90 TABLET | Refills: 3 | Status: CANCELLED | OUTPATIENT
Start: 2021-09-13

## 2021-09-13 RX ORDER — FAMOTIDINE 20 MG/1
20 TABLET, FILM COATED ORAL 2 TIMES DAILY
Qty: 60 TABLET | Refills: 3 | Status: SHIPPED | OUTPATIENT
Start: 2021-09-13

## 2021-09-13 RX ORDER — AMITRIPTYLINE HYDROCHLORIDE 25 MG/1
25 TABLET, FILM COATED ORAL
Qty: 30 TABLET | Refills: 3 | Status: SHIPPED | OUTPATIENT
Start: 2021-09-13

## 2021-09-13 NOTE — PROGRESS NOTES
Assessment/Plan:    No problem-specific Assessment & Plan notes found for this encounter  Diagnoses and all orders for this visit:    Other migraine without status migrainosus, not intractable  After discussing risks and benefits of medication along with side effects will start the following:  -     amitriptyline (ELAVIL) 25 mg tablet; Take 1 tablet (25 mg total) by mouth daily at bedtime  -     SUMAtriptan (IMITREX) 50 mg tablet; Take 1 tablet (50 mg total) by mouth once as needed for migraine for up to 1 dose    Nausea  -     famotidine (PEPCID) 20 mg tablet; Take 1 tablet (20 mg total) by mouth 2 (two) times a day    Gastroesophageal reflux disease without esophagitis  Continue pepcid only at this time  If symptoms worsen F/U with Gastro  Follow up in 6 months or as needed          Subjective:      Patient ID: Julianne Daniels is a 42 y o  female  Patient has been having daily headaches which are pressure like and present on the back of her head as well as temples  She has a Hx of migraine headaches  Was taking Imitrex hopwever ran out  The following portions of the patient's history were reviewed and updated as appropriate:   She  has a past medical history of Acid reflux disease, Allergic rhinitis, seasonal, Asthma, Hiatal hernia, Migraine, and Ulcer of gastroesophageal junction    She   Patient Active Problem List    Diagnosis Date Noted    Tendinopathy of rotator cuff, right 06/10/2021    Subacromial bursitis of right shoulder joint 06/10/2021    Encounter for physical examination of prospective  06/01/2021    Pregnancy of unknown anatomic location 02/25/2020    Inappropriate change in quantitative hCG in early pregnancy 02/16/2020    Encounter for gynecological examination (general) (routine) without abnormal findings 11/22/2019    Screening for breast cancer 11/22/2019    Patient desires pregnancy 11/08/2019    Urine pregnancy test negative 11/08/2019    Missed menses 11/08/2019    Migraine 10/18/2018    Family history of breast cancer 10/05/2018    Gastroesophageal reflux disease without esophagitis 08/31/2018     She  has a past surgical history that includes [place] tooth extraction and pr egd transoral biopsy single/multiple (N/A, 2/5/2019)  Her family history includes Anxiety disorder in her brother, daughter, mother, and sister; Asthma in her son; Diabetes in her maternal grandmother; Hyperlipidemia in her mother; No Known Problems in her brother, brother, brother, maternal grandfather, paternal grandfather, paternal grandmother, and son  She  reports that she has never smoked  She has never used smokeless tobacco  She reports current alcohol use  She reports that she does not use drugs  Current Outpatient Medications   Medication Sig Dispense Refill    pantoprazole (PROTONIX) 40 mg tablet Take 1 tablet (40 mg total) by mouth daily 90 tablet 3    amitriptyline (ELAVIL) 25 mg tablet Take 1 tablet (25 mg total) by mouth daily at bedtime 30 tablet 3    famotidine (PEPCID) 20 mg tablet Take 1 tablet (20 mg total) by mouth 2 (two) times a day 60 tablet 3    SUMAtriptan (IMITREX) 50 mg tablet Take 1 tablet (50 mg total) by mouth once as needed for migraine for up to 1 dose 9 tablet 3     No current facility-administered medications for this visit  Current Outpatient Medications on File Prior to Visit   Medication Sig    pantoprazole (PROTONIX) 40 mg tablet Take 1 tablet (40 mg total) by mouth daily    [DISCONTINUED] famotidine (PEPCID) 20 mg tablet TAKE 1 TABLET BY MOUTH TWICE A DAY    [DISCONTINUED] tiZANidine (ZANAFLEX) 4 mg tablet Take 4 mg by mouth every 8 (eight) hours as needed for muscle spasms    [DISCONTINUED] SUMAtriptan (IMITREX) 50 mg tablet TAKE 1 TABLET (50 MG TOTAL) BY MOUTH ONCE AS NEEDED FOR MIGRAINE FOR UP TO 1 DOSE (Patient not taking: Reported on 9/13/2021)     No current facility-administered medications on file prior to visit       She has No Known Allergies       Review of Systems   Constitutional: Negative for activity change, appetite change, fatigue and fever  HENT: Negative for congestion and ear discharge  Respiratory: Negative for cough and shortness of breath  Cardiovascular: Negative for chest pain and palpitations  Gastrointestinal: Negative for diarrhea and nausea  Musculoskeletal: Negative for arthralgias and back pain  Skin: Negative for color change and rash  Neurological: Positive for headaches  Negative for dizziness  Psychiatric/Behavioral: Negative for agitation and behavioral problems  Objective:      /73   Pulse 74   Temp (!) 97 1 °F (36 2 °C)   Ht 5' 2" (1 575 m)   Wt 88 kg (194 lb)   LMP 08/01/2021   SpO2 98%   BMI 35 48 kg/m²          Physical Exam  Constitutional:       General: She is not in acute distress  Appearance: She is well-developed  She is not diaphoretic  HENT:      Head: Normocephalic and atraumatic  Nose: Nose normal    Eyes:      Conjunctiva/sclera: Conjunctivae normal       Pupils: Pupils are equal, round, and reactive to light  Cardiovascular:      Rate and Rhythm: Normal rate and regular rhythm  Heart sounds: Normal heart sounds  No murmur heard  Pulmonary:      Effort: Pulmonary effort is normal  No respiratory distress  Breath sounds: Normal breath sounds  No wheezing  Abdominal:      General: Bowel sounds are normal  There is no distension  Palpations: Abdomen is soft  Tenderness: There is no abdominal tenderness  Skin:     General: Skin is warm and dry  Findings: No erythema or rash  Neurological:      Mental Status: She is alert and oriented to person, place, and time

## 2021-09-14 DIAGNOSIS — G43.809 OTHER MIGRAINE WITHOUT STATUS MIGRAINOSUS, NOT INTRACTABLE: Primary | ICD-10-CM

## 2021-09-14 RX ORDER — BUTALBITAL, ACETAMINOPHEN AND CAFFEINE 50; 325; 40 MG/1; MG/1; MG/1
1 TABLET ORAL EVERY 4 HOURS PRN
Qty: 30 TABLET | Refills: 1 | Status: SHIPPED | OUTPATIENT
Start: 2021-09-14

## 2022-04-02 ENCOUNTER — OFFICE VISIT (OUTPATIENT)
Dept: URGENT CARE | Facility: CLINIC | Age: 47
End: 2022-04-02
Payer: COMMERCIAL

## 2022-04-02 VITALS
HEART RATE: 115 BPM | SYSTOLIC BLOOD PRESSURE: 98 MMHG | RESPIRATION RATE: 14 BRPM | OXYGEN SATURATION: 100 % | TEMPERATURE: 98.6 F | DIASTOLIC BLOOD PRESSURE: 62 MMHG

## 2022-04-02 DIAGNOSIS — J01.91 ACUTE RECURRENT SINUSITIS, UNSPECIFIED LOCATION: Primary | ICD-10-CM

## 2022-04-02 PROCEDURE — G0382 LEV 3 HOSP TYPE B ED VISIT: HCPCS | Performed by: EMERGENCY MEDICINE

## 2022-04-02 RX ORDER — AMOXICILLIN AND CLAVULANATE POTASSIUM 875; 125 MG/1; MG/1
1 TABLET, FILM COATED ORAL EVERY 12 HOURS SCHEDULED
Qty: 14 TABLET | Refills: 0 | Status: SHIPPED | OUTPATIENT
Start: 2022-04-02 | End: 2022-04-09

## 2022-04-02 RX ORDER — METHYLPREDNISOLONE 4 MG/1
TABLET ORAL
Qty: 21 TABLET | Refills: 0 | Status: SHIPPED | OUTPATIENT
Start: 2022-04-02 | End: 2022-04-02

## 2022-04-02 RX ORDER — METHYLPREDNISOLONE 4 MG/1
TABLET ORAL
Qty: 21 TABLET | Refills: 0 | Status: SHIPPED | OUTPATIENT
Start: 2022-04-02

## 2022-04-02 RX ORDER — AMOXICILLIN AND CLAVULANATE POTASSIUM 875; 125 MG/1; MG/1
1 TABLET, FILM COATED ORAL EVERY 12 HOURS SCHEDULED
Qty: 14 TABLET | Refills: 0 | Status: SHIPPED | OUTPATIENT
Start: 2022-04-02 | End: 2022-04-02

## 2022-04-02 NOTE — PATIENT INSTRUCTIONS
1   F/u with PCP in 3-5 days  2  Tylenol/ Motrin for pain  3  Encourage liquids and rest          Sinusitis, Ambulatory Care   GENERAL INFORMATION:   Sinusitis  is inflammation or infection of your sinuses  It is most often caused by a virus  Acute sinusitis may last up to 12 weeks  Chronic sinusitis lasts longer than 12 weeks  Recurrent sinusitis is when you have 3 or more episodes of sinusitis in 1 year  Common symptoms include the following:   · Fever    · Pain, pressure, redness, or swelling around the forehead, cheeks, or eyes    · Thick yellow or green discharge from your nose    · Tenderness when you touch your face over your sinuses    · Dry cough that happens mostly at night or when you lie down    · Headache and face pain that is worse when you lean forward    · Teeth pain or pain when you chew  Seek immediate care for the following symptoms:   · Vision changes such as double vision    · Confusion or trouble thinking clearly    · Headache and stiff neck    · Trouble breathing  Treatment for sinusitis  may include medicines to relieve nasal and sinus congestion or to decrease pain and fever  Ask your healthcare provider which medicines you should take and how much is safe  Manage sinusitis:   · Drink liquids as directed  Ask your healthcare provider how much liquid to drink each day and which liquids are best for you  Liquids will help loosen and drain the mucus in your sinuses  · Breathe in steam   Heat a bowl of water until you see steam  Lean over the bowl and make a tent over your head with a large towel  Breathe deeply for about 20 minutes  Be careful not to get too close to the steam or burn yourself  Do this 3 times a day  You can also breathe deeply when you take a hot shower  · Rinse your sinuses  Use a sinus rinse device to rinse your nasal passages with a saline (salt water) solution  This will help thin the mucus in your nose and rinse away pollen and dirt   It will also help reduce swelling so you can breathe normally  Ask how often to do this  · Use heat on your sinuses  to decrease pain  Apply heat for 15 to 20 minutes every hour for as many days as directed  · Sleep with your head elevated  Place an extra pillow under your head before you go to sleep to help your sinuses drain  · Do not smoke and avoid secondhand smoke  If you smoke, it is never too late to quit  Ask for information about how to stop smoking if you need help  Prevent the spread of germs that cause sinusitis:  Wash your hands often with soap and water  Wash your hands after you use the bathroom, change a child's diaper, or sneeze  Wash your hands before you prepare or eat food  Follow up with your healthcare provider as directed:  Write down your questions so you remember to ask them during your visits  CARE AGREEMENT:   You have the right to help plan your care  Learn about your health condition and how it may be treated  Discuss treatment options with your caregivers to decide what care you want to receive  You always have the right to refuse treatment  The above information is an  only  It is not intended as medical advice for individual conditions or treatments  Talk to your doctor, nurse or pharmacist before following any medical regimen to see if it is safe and effective for you  © 2014 [address] is for End User's use only and may not be sold, redistributed or otherwise used for commercial purposes  All illustrations and images included in CareNotes® are the copyrighted property of [employer]  or [employer]

## 2022-04-02 NOTE — PROGRESS NOTES
[facility]        NAME: Haritha Ambrocio is a 41 y o  female  : 1975    MRN: [id number]  DATE: 2022  TIME: 11:33 AM    Assessment and Plan   Acute recurrent sinusitis, unspecified location [J01 [id number]]  1  Acute recurrent sinusitis, unspecified location  amoxicillin-clavulanate (AUGMENTIN) 875-125 mg per tablet    methylPREDNISolone 4 MG tablet therapy pack    DISCONTINUED: amoxicillin-clavulanate (AUGMENTIN) 875-125 mg per tablet    DISCONTINUED: methylPREDNISolone 4 MG tablet therapy pack         Patient Instructions     Patient Instructions   1  F/u with PCP in 3-5 days  2  Tylenol/ Motrin for pain  3  Encourage liquids and rest          Sinusitis, Ambulatory Care   GENERAL INFORMATION:   Sinusitis  is inflammation or infection of your sinuses  It is most often caused by a virus  Acute sinusitis may last up to 12 weeks  Chronic sinusitis lasts longer than 12 weeks  Recurrent sinusitis is when you have 3 or more episodes of sinusitis in 1 year  Common symptoms include the following:   · Fever    · Pain, pressure, redness, or swelling around the forehead, cheeks, or eyes    · Thick yellow or green discharge from your nose    · Tenderness when you touch your face over your sinuses    · Dry cough that happens mostly at night or when you lie down    · Headache and face pain that is worse when you lean forward    · Teeth pain or pain when you chew  Seek immediate care for the following symptoms:   · Vision changes such as double vision    · Confusion or trouble thinking clearly    · Headache and stiff neck    · Trouble breathing  Treatment for sinusitis  may include medicines to relieve nasal and sinus congestion or to decrease pain and fever  Ask your healthcare provider which medicines you should take and how much is safe  Manage sinusitis:   · Drink liquids as directed  Ask your healthcare provider how much liquid to drink each day and which liquids are best for you   Liquids will help loosen and drain the mucus in your sinuses  · Breathe in steam   Heat a bowl of water until you see steam  Lean over the bowl and make a tent over your head with a large towel  Breathe deeply for about 20 minutes  Be careful not to get too close to the steam or burn yourself  Do this 3 times a day  You can also breathe deeply when you take a hot shower  · Rinse your sinuses  Use a sinus rinse device to rinse your nasal passages with a saline (salt water) solution  This will help thin the mucus in your nose and rinse away pollen and dirt  It will also help reduce swelling so you can breathe normally  Ask how often to do this  · Use heat on your sinuses  to decrease pain  Apply heat for 15 to 20 minutes every hour for as many days as directed  · Sleep with your head elevated  Place an extra pillow under your head before you go to sleep to help your sinuses drain  · Do not smoke and avoid secondhand smoke  If you smoke, it is never too late to quit  Ask for information about how to stop smoking if you need help  Prevent the spread of germs that cause sinusitis:  Wash your hands often with soap and water  Wash your hands after you use the bathroom, change a child's diaper, or sneeze  Wash your hands before you prepare or eat food  Follow up with your healthcare provider as directed:  Write down your questions so you remember to ask them during your visits  CARE AGREEMENT:   You have the right to help plan your care  Learn about your health condition and how it may be treated  Discuss treatment options with your caregivers to decide what care you want to receive  You always have the right to refuse treatment  The above information is an  only  It is not intended as medical advice for individual conditions or treatments  Talk to your doctor, nurse or pharmacist before following any medical regimen to see if it is safe and effective for you    © 2014 [facility]  Information is for End User's use only and may not be sold, redistributed or otherwise used for commercial purposes  All illustrations and images included in CareNotes® are the copyrighted property of [employer]  or [employer]  Follow up with PCP in 3-5 days  Proceed to  ER if symptoms worsen  Chief Complaint     Chief Complaint   Patient presents with    Sore Throat     since last night  Mild cough when laying down, post nasal drip  Prone to seasonal allergies  Son had similar sx  No flu or covid vaccine  Negative home covid test today   Earache     mostly when swallowing   Nasal Congestion     sinus pressure, headache  Not taking any meds currently          History of Present Illness       40 yo w female with cc nasal congestion, sinus pressure, headache x 1 day  Patient states this is similar to her sinus infections in the past   Patient took a home COVID test that was negative  Review of Systems   Review of Systems   Constitutional: Negative for chills and fever  HENT: Positive for congestion, sinus pressure, sinus pain and sore throat  Negative for rhinorrhea  Eyes: Negative for discharge and visual disturbance  Respiratory: Positive for cough  Negative for shortness of breath and wheezing  Cardiovascular: Negative for chest pain and palpitations  Gastrointestinal: Negative for abdominal pain and vomiting  Endocrine: Negative for polydipsia and polyuria  Genitourinary: Negative for dysuria and hematuria  Musculoskeletal: Negative for arthralgias, gait problem and neck stiffness  Skin: Negative for rash and wound  Neurological: Positive for headaches  Negative for dizziness  Psychiatric/Behavioral: Negative for confusion and suicidal ideas           Current Medications       Current Outpatient Medications:     amitriptyline (ELAVIL) 25 mg tablet, Take 1 tablet (25 mg total) by mouth daily at bedtime, Disp: 30 tablet, Rfl: 3   butalbital-acetaminophen-caffeine (FIORICET,ESGIC) -40 mg per tablet, Take 1 tablet by mouth every 4 (four) hours as needed for headaches, Disp: 30 tablet, Rfl: 1    famotidine (PEPCID) 20 mg tablet, Take 1 tablet (20 mg total) by mouth 2 (two) times a day, Disp: 60 tablet, Rfl: 3    amoxicillin-clavulanate (AUGMENTIN) 875-125 mg per tablet, Take 1 tablet by mouth every 12 (twelve) hours for 7 days, Disp: 14 tablet, Rfl: 0    methylPREDNISolone 4 MG tablet therapy pack, Use as directed on package, Disp: 21 tablet, Rfl: 0    pantoprazole (PROTONIX) 40 mg tablet, Take 1 tablet (40 mg total) by mouth daily (Patient not taking: Reported on 4/2/2022 ), Disp: 90 tablet, Rfl: 3    Current Allergies     Allergies as of 04/02/2022    (No Known Allergies)            The following portions of the patient's history were reviewed and updated as appropriate: allergies, current medications, past family history, past medical history, past social history, past surgical history and problem list      Past Medical History:   Diagnosis Date    Acid reflux disease     Allergic rhinitis, seasonal     Asthma     Hiatal hernia     Migraine     Ulcer of gastroesophageal junction        Past Surgical History:   Procedure Laterality Date    MI EGD TRANSORAL BIOPSY SINGLE/MULTIPLE N/A 2/5/2019    Procedure: ESOPHAGOGASTRODUODENOSCOPY (EGD); Surgeon: Edda Pacheco DO;  Location: [facility];   Service: Gastroenterology    WISDOM TOOTH EXTRACTION         Family History   Problem Relation Age of Onset    Anxiety disorder Mother     Hyperlipidemia Mother     Anxiety disorder Sister     No Known Problems Brother     Anxiety disorder Daughter     Asthma Son     Diabetes Maternal Grandmother     No Known Problems Maternal Grandfather     No Known Problems Paternal Grandmother     No Known Problems Paternal Grandfather     No Known Problems Son     No Known Problems Brother     Anxiety disorder Brother     No Known Problems Brother          Medications have been verified  Objective   BP 98/62   Pulse (!) 115   Temp 98 6 °F (37 °C)   Resp 14   SpO2 100%        Physical Exam     Physical Exam  Vitals reviewed  Constitutional:       General: She is not in acute distress  Appearance: She is well-developed  She is not ill-appearing, toxic-appearing or diaphoretic  Comments: 73-year-old white female who is extremely congested complaining of sinus pressure  HENT:      Head: Normocephalic and atraumatic  Right Ear: Tympanic membrane normal       Left Ear: Tympanic membrane and ear canal normal       Nose: Congestion present  Comments: Positive maxillary and frontal sinus tenderness     Mouth/Throat:      Pharynx: Oropharynx is clear  Posterior oropharyngeal erythema present  Comments: Mild erythema posterior pharynx  Eyes:      General: No scleral icterus  Extraocular Movements: Extraocular movements intact  Pupils: Pupils are equal, round, and reactive to light  Cardiovascular:      Rate and Rhythm: Tachycardia present  Heart sounds: Normal heart sounds  Pulmonary:      Effort: Pulmonary effort is normal  No respiratory distress  Breath sounds: Normal breath sounds  No stridor  No wheezing, rhonchi or rales  Chest:      Chest wall: No tenderness  Abdominal:      General: Abdomen is flat  Bowel sounds are normal  There is no distension  Palpations: Abdomen is soft  There is no shifting dullness, hepatomegaly, splenomegaly or mass  Tenderness: There is no abdominal tenderness  There is no right CVA tenderness, left CVA tenderness or guarding  Negative signs include Figueroa's sign and McBurney's sign  Hernia: No hernia is present  Skin:     General: Skin is warm and dry  Coloration: Skin is not cyanotic, jaundiced, mottled or pale  Findings: No erythema  Neurological:      General: No focal deficit present        Mental Status: She is alert and oriented to person, place, and time     Psychiatric:         Mood and Affect: Mood normal

## 2022-04-02 NOTE — PROGRESS NOTES
[facility]        NAME: Greg Griffin is a 41 y o  female  : 1975    MRN: [id number]  DATE: 2022  TIME: 11:02 AM    Assessment and Plan   No primary diagnosis found  No diagnosis found  Patient Instructions   There are no Patient Instructions on file for this visit  Follow up with PCP in 3-5 days  Proceed to  ER if symptoms worsen  Chief Complaint     Chief Complaint   Patient presents with    Sore Throat     since last night  Mild cough when laying down, post nasal drip  Prone to seasonal allergies  Son had similar sx  No flu or covid vaccine  Negative home covid test today   Earache     mostly when swallowing   Nasal Congestion     sinus pressure, headache   Not taking any meds currently          History of Present Illness       HPI    Review of Systems   Review of Systems      Current Medications       Current Outpatient Medications:     amitriptyline (ELAVIL) 25 mg tablet, Take 1 tablet (25 mg total) by mouth daily at bedtime, Disp: 30 tablet, Rfl: 3    butalbital-acetaminophen-caffeine (FIORICET,ESGIC) -40 mg per tablet, Take 1 tablet by mouth every 4 (four) hours as needed for headaches, Disp: 30 tablet, Rfl: 1    famotidine (PEPCID) 20 mg tablet, Take 1 tablet (20 mg total) by mouth 2 (two) times a day, Disp: 60 tablet, Rfl: 3    pantoprazole (PROTONIX) 40 mg tablet, Take 1 tablet (40 mg total) by mouth daily (Patient not taking: Reported on 2022 ), Disp: 90 tablet, Rfl: 3    Current Allergies     Allergies as of 2022    (No Known Allergies)            The following portions of the patient's history were reviewed and updated as appropriate: allergies, current medications, past family history, past medical history, past social history, past surgical history and problem list      Past Medical History:   Diagnosis Date    Acid reflux disease     Allergic rhinitis, seasonal     Asthma     Hiatal hernia     Migraine     Ulcer of gastroesophageal junction        Past Surgical History:   Procedure Laterality Date    [place] EGD TRANSORAL BIOPSY SINGLE/MULTIPLE N/A 2/5/2019    Procedure: ESOPHAGOGASTRODUODENOSCOPY (EGD); Surgeon: Kannan Kenney DO;  Location: [facility]; Service: Gastroenterology    WISDOM TOOTH EXTRACTION         Family History   Problem Relation Age of Onset    Anxiety disorder Mother     Hyperlipidemia Mother     Anxiety disorder Sister     No Known Problems Brother     Anxiety disorder Daughter     Asthma Son     Diabetes Maternal Grandmother     No Known Problems Maternal Grandfather     No Known Problems Paternal Grandmother     No Known Problems Paternal Grandfather     No Known Problems Son     No Known Problems Brother     Anxiety disorder Brother     No Known Problems Brother          Medications have been verified          Objective   BP 98/62   Pulse (!) 115   Temp 98 6 °F (37 °C)   Resp 14   SpO2 100%        Physical Exam     Physical Exam

## 2022-08-18 ENCOUNTER — OFFICE VISIT (OUTPATIENT)
Dept: FAMILY MEDICINE CLINIC | Facility: CLINIC | Age: 47
End: 2022-08-18
Payer: COMMERCIAL

## 2022-08-18 VITALS
BODY MASS INDEX: 33.47 KG/M2 | TEMPERATURE: 97.5 F | DIASTOLIC BLOOD PRESSURE: 64 MMHG | WEIGHT: 183 LBS | SYSTOLIC BLOOD PRESSURE: 114 MMHG | HEART RATE: 75 BPM | OXYGEN SATURATION: 98 %

## 2022-08-18 DIAGNOSIS — R11.2 NAUSEA AND VOMITING, UNSPECIFIED VOMITING TYPE: ICD-10-CM

## 2022-08-18 DIAGNOSIS — H81.11 BENIGN PAROXYSMAL POSITIONAL VERTIGO OF RIGHT EAR: Primary | ICD-10-CM

## 2022-08-18 PROCEDURE — 99213 OFFICE O/P EST LOW 20 MIN: CPT | Performed by: NURSE PRACTITIONER

## 2022-08-18 PROCEDURE — 3725F SCREEN DEPRESSION PERFORMED: CPT | Performed by: NURSE PRACTITIONER

## 2022-08-18 RX ORDER — ONDANSETRON 4 MG/1
4 TABLET, ORALLY DISINTEGRATING ORAL EVERY 6 HOURS PRN
Qty: 20 TABLET | Refills: 0 | Status: SHIPPED | OUTPATIENT
Start: 2022-08-18

## 2022-08-18 RX ORDER — MECLIZINE HYDROCHLORIDE 25 MG/1
25 TABLET ORAL EVERY 8 HOURS PRN
Qty: 30 TABLET | Refills: 0 | Status: SHIPPED | OUTPATIENT
Start: 2022-08-18 | End: 2022-08-28

## 2022-08-18 NOTE — PROGRESS NOTES
Assessment/Plan:           Problem List Items Addressed This Visit        Digestive    Nausea and vomiting    Relevant Medications    meclizine (ANTIVERT) 25 mg tablet    ondansetron (Zofran ODT) 4 mg disintegrating tablet    Other Relevant Orders    Ambulatory Referral to Physical Therapy       Nervous and Auditory    Benign paroxysmal positional vertigo of right ear - Primary     DW patient about BPPV and discussed medications and to f/u with PT if not improving          Relevant Medications    meclizine (ANTIVERT) 25 mg tablet    ondansetron (Zofran ODT) 4 mg disintegrating tablet    Other Relevant Orders    Ambulatory Referral to Physical Therapy            Subjective:      Patient ID: Andrew Chaves is a 41 y o  female  Patient here today and report sthat she woke up dizziness and nausea and felt very dizziness and when she sat up felt very dizziness and threw up twice and vomitted twice and not as bad as this morning but still present  Patient has not had this happen before and felt fine going to bed last night just woke up like this       The following portions of the patient's history were reviewed and updated as appropriate:   She  has a past medical history of Acid reflux disease, Allergic rhinitis, seasonal, Asthma, Hiatal hernia, Migraine, and Ulcer of gastroesophageal junction    She   Patient Active Problem List    Diagnosis Date Noted    Benign paroxysmal positional vertigo of right ear 08/18/2022    Nausea and vomiting 08/18/2022    Tendinopathy of rotator cuff, right 06/10/2021    Subacromial bursitis of right shoulder joint 06/10/2021    Encounter for physical examination of prospective  06/01/2021    Pregnancy of unknown anatomic location 02/25/2020    Inappropriate change in quantitative hCG in early pregnancy 02/16/2020    Encounter for gynecological examination (general) (routine) without abnormal findings 11/22/2019    Screening for breast cancer 11/22/2019    Patient desires pregnancy 11/08/2019    Urine pregnancy test negative 11/08/2019    Missed menses 11/08/2019    Migraine 10/18/2018    Family history of breast cancer 10/05/2018    Gastroesophageal reflux disease without esophagitis 08/31/2018     She  has a past surgical history that includes [place] tooth extraction and pr egd transoral biopsy single/multiple (N/A, 2/5/2019)  Her family history includes Anxiety disorder in her brother, daughter, mother, and sister; Asthma in her son; Diabetes in her maternal grandmother; Hyperlipidemia in her mother; No Known Problems in her brother, brother, brother, maternal grandfather, paternal grandfather, paternal grandmother, and son  She  reports that she has never smoked  She has never used smokeless tobacco  She reports previous alcohol use  She reports that she does not use drugs  Current Outpatient Medications   Medication Sig Dispense Refill    meclizine (ANTIVERT) 25 mg tablet Take 1 tablet (25 mg total) by mouth every 8 (eight) hours as needed for dizziness for up to 10 days 30 tablet 0    ondansetron (Zofran ODT) 4 mg disintegrating tablet Take 1 tablet (4 mg total) by mouth every 6 (six) hours as needed for nausea or vomiting 20 tablet 0    amitriptyline (ELAVIL) 25 mg tablet Take 1 tablet (25 mg total) by mouth daily at bedtime 30 tablet 3    butalbital-acetaminophen-caffeine (FIORICET,ESGIC) -40 mg per tablet Take 1 tablet by mouth every 4 (four) hours as needed for headaches 30 tablet 1    famotidine (PEPCID) 20 mg tablet Take 1 tablet (20 mg total) by mouth 2 (two) times a day 60 tablet 3     No current facility-administered medications for this visit  She has No Known Allergies       Review of Systems   Constitutional: Negative for activity change, appetite change, chills, diaphoresis, fatigue, fever and unexpected weight change     HENT: Negative for congestion, ear pain, hearing loss, postnasal drip, sinus pressure, sinus pain, sneezing, sore throat and trouble swallowing  Eyes: Negative for pain, redness and visual disturbance  Respiratory: Negative for cough and shortness of breath  Cardiovascular: Negative for chest pain and leg swelling  Gastrointestinal: Positive for nausea and vomiting  Negative for abdominal pain and diarrhea  Endocrine: Negative  Genitourinary: Negative  Musculoskeletal: Positive for back pain and neck pain  Negative for arthralgias  Working from home and has been having pain in her neck and back    Skin: Negative  Allergic/Immunologic: Negative  Neurological: Positive for dizziness  Negative for tremors, syncope, speech difficulty, weakness, light-headedness and headaches  Hematological: Negative  Psychiatric/Behavioral: Negative for behavioral problems and dysphoric mood  Objective:      /64   Pulse 75   Temp 97 5 °F (36 4 °C)   Wt 83 kg (183 lb)   SpO2 98%   BMI 33 47 kg/m²          Physical Exam  Constitutional:       General: She is not in acute distress  Appearance: She is well-developed  HENT:      Head: Normocephalic and atraumatic  Right Ear: Tympanic membrane normal       Nose: Nose normal       Mouth/Throat:      Mouth: Mucous membranes are moist    Eyes:      Pupils: Pupils are equal, round, and reactive to light  Neck:      Thyroid: No thyromegaly  Cardiovascular:      Rate and Rhythm: Normal rate and regular rhythm  Heart sounds: Normal heart sounds  No murmur heard  Pulmonary:      Effort: Pulmonary effort is normal  No respiratory distress  Breath sounds: Normal breath sounds  No wheezing  Abdominal:      General: Bowel sounds are normal       Palpations: Abdomen is soft  Musculoskeletal:         General: Normal range of motion  Cervical back: Normal range of motion  Skin:     General: Skin is warm and dry  Neurological:      General: No focal deficit present        Mental Status: She is alert and oriented to person, place, and time  Comments: Slight nystagamus to the right    Psychiatric:         Mood and Affect: Mood normal          Behavior: Behavior normal          Thought Content:  Thought content normal          Judgment: Judgment normal

## 2022-08-19 ENCOUNTER — TELEPHONE (OUTPATIENT)
Dept: FAMILY MEDICINE CLINIC | Facility: CLINIC | Age: 47
End: 2022-08-19

## 2022-08-19 NOTE — TELEPHONE ENCOUNTER
Saw Arlet Daigle yesterday    she is feeling better but still dizzy  And is asking if Arlet Daigle would give her a Dr's note for her to be out of work yesterday and today    she forgot to ask for the note when she was here yesterday  Please put the note in pt's My chart portal

## 2022-09-13 ENCOUNTER — TELEPHONE (OUTPATIENT)
Dept: OBGYN CLINIC | Facility: CLINIC | Age: 47
End: 2022-09-13

## 2022-10-12 PROBLEM — Z02.89 ENCOUNTER FOR PHYSICAL EXAMINATION OF PROSPECTIVE FOSTER PARENT: Status: RESOLVED | Noted: 2021-06-01 | Resolved: 2022-10-12

## 2022-10-20 PROBLEM — Z80.3 FAMILY HISTORY OF BREAST CANCER: Status: RESOLVED | Noted: 2018-10-05 | Resolved: 2022-10-20

## 2022-11-04 ENCOUNTER — OFFICE VISIT (OUTPATIENT)
Dept: FAMILY MEDICINE CLINIC | Facility: CLINIC | Age: 47
End: 2022-11-04

## 2022-11-04 VITALS
TEMPERATURE: 97.9 F | BODY MASS INDEX: 34.48 KG/M2 | HEIGHT: 62 IN | DIASTOLIC BLOOD PRESSURE: 68 MMHG | HEART RATE: 74 BPM | OXYGEN SATURATION: 98 % | WEIGHT: 187.4 LBS | SYSTOLIC BLOOD PRESSURE: 118 MMHG

## 2022-11-04 DIAGNOSIS — Z13.220 SCREENING FOR LIPID DISORDERS: ICD-10-CM

## 2022-11-04 DIAGNOSIS — Z12.31 SCREENING MAMMOGRAM FOR BREAST CANCER: ICD-10-CM

## 2022-11-04 DIAGNOSIS — M54.6 ACUTE RIGHT-SIDED THORACIC BACK PAIN: ICD-10-CM

## 2022-11-04 DIAGNOSIS — G43.809 OTHER MIGRAINE WITHOUT STATUS MIGRAINOSUS, NOT INTRACTABLE: Primary | ICD-10-CM

## 2022-11-04 DIAGNOSIS — T14.8XXA ABRASION: ICD-10-CM

## 2022-11-04 DIAGNOSIS — Z13.1 SCREENING FOR DIABETES MELLITUS: ICD-10-CM

## 2022-11-04 DIAGNOSIS — R53.82 CHRONIC FATIGUE: ICD-10-CM

## 2022-11-04 DIAGNOSIS — R41.3 MEMORY LOSS: ICD-10-CM

## 2022-11-04 PROBLEM — M75.51 SUBACROMIAL BURSITIS OF RIGHT SHOULDER JOINT: Status: RESOLVED | Noted: 2021-06-10 | Resolved: 2022-11-04

## 2022-11-04 PROBLEM — N92.6 MISSED MENSES: Status: RESOLVED | Noted: 2019-11-08 | Resolved: 2022-11-04

## 2022-11-04 PROBLEM — N91.2 ABSENT MENSES: Status: ACTIVE | Noted: 2022-11-04

## 2022-11-04 PROBLEM — M54.50 ACUTE RIGHT-SIDED LOW BACK PAIN WITHOUT SCIATICA: Status: ACTIVE | Noted: 2022-11-04

## 2022-11-04 PROBLEM — O36.80X0 PREGNANCY OF UNKNOWN ANATOMIC LOCATION: Status: RESOLVED | Noted: 2020-02-25 | Resolved: 2022-11-04

## 2022-11-04 PROBLEM — N39.3 URINARY, INCONTINENCE, STRESS FEMALE: Status: ACTIVE | Noted: 2022-11-04

## 2022-11-04 PROBLEM — Z01.419 ENCOUNTER FOR GYNECOLOGICAL EXAMINATION (GENERAL) (ROUTINE) WITHOUT ABNORMAL FINDINGS: Status: RESOLVED | Noted: 2019-11-22 | Resolved: 2022-11-04

## 2022-11-04 PROBLEM — H81.11 BENIGN PAROXYSMAL POSITIONAL VERTIGO OF RIGHT EAR: Status: RESOLVED | Noted: 2022-08-18 | Resolved: 2022-11-04

## 2022-11-04 PROBLEM — O02.81 INAPPROPRIATE CHANGE IN QUANTITATIVE HCG IN EARLY PREGNANCY: Status: RESOLVED | Noted: 2020-02-16 | Resolved: 2022-11-04

## 2022-11-04 PROBLEM — Z31.9 PATIENT DESIRES PREGNANCY: Status: RESOLVED | Noted: 2019-11-08 | Resolved: 2022-11-04

## 2022-11-04 PROBLEM — R11.2 NAUSEA AND VOMITING: Status: RESOLVED | Noted: 2022-08-18 | Resolved: 2022-11-04

## 2022-11-04 PROBLEM — Z32.02 URINE PREGNANCY TEST NEGATIVE: Status: RESOLVED | Noted: 2019-11-08 | Resolved: 2022-11-04

## 2022-11-04 RX ORDER — METHOCARBAMOL 500 MG/1
500 TABLET, FILM COATED ORAL 2 TIMES DAILY PRN
Qty: 28 TABLET | Refills: 0 | Status: SHIPPED | OUTPATIENT
Start: 2022-11-04 | End: 2022-11-18

## 2022-11-04 RX ORDER — AMITRIPTYLINE HYDROCHLORIDE 25 MG/1
25 TABLET, FILM COATED ORAL
Qty: 30 TABLET | Refills: 3 | Status: SHIPPED | OUTPATIENT
Start: 2022-11-04

## 2022-11-04 NOTE — PROGRESS NOTES
Assessment/Plan:           Problem List Items Addressed This Visit        Cardiovascular and Mediastinum    Migraine - Primary     elavil helps prevent and reduce her migraines         Relevant Medications    amitriptyline (ELAVIL) 25 mg tablet    methocarbamol (ROBAXIN) 500 mg tablet       Other    Screening mammogram for breast cancer    Relevant Orders    Mammo screening bilateral w 3d & cad    Acute right-sided thoracic back pain     Discussed exercises to relieve back pain and adding a muscle relaxant          Relevant Medications    methocarbamol (ROBAXIN) 500 mg tablet    Abrasion    Relevant Medications    mupirocin (BACTROBAN) 2 % ointment    Chronic fatigue    Relevant Orders    CBC and differential    Comprehensive metabolic panel    TSH, 3rd generation with Free T4 reflex    Memory loss    Relevant Orders    CBC and differential    Comprehensive metabolic panel    TSH, 3rd generation with Free T4 reflex    Screening for lipid disorders    Relevant Orders    Lipid Panel with Direct LDL reflex    Screening for diabetes mellitus    Relevant Orders    HEMOGLOBIN A1C W/ EAG ESTIMATION            Subjective:      Patient ID: Jenni Logan is a 41 y o  female  Patient her today for several concerns  Allergies congestion and runny nose for 3 days  Concerned about right side of her back having needle- like stabbing pain and tingling  She has been working from home for a year now, and says sitting all day didn't hurt for months, and she bought a standing desk but it didn't help  Starts from right side of waist down to hip  Only started happening after she started working from home, about 6 months  Pain is less while standing or sitting up very straight, denies pain with rotation or moving arm  Denies tingling or numbness to right arm or leg  Has tried tylenol for the back pain which has not helped  Has not gotten her menses in a year, spoke to a gynecologist last year about her skipped periods   Patient has hot flashes while sleeping, and is having trouble sleeping  Gets up during night to go to bathroom, about twice  Very hard for her to get back to sleep  Patient reports that when she sneezes she is having urine leak out while she is standing  Patient says she is losing her memory, has been going on for approximately 6 months  Says she is forgetting processes at work while she is completing them  Forgetting things she has said  Needs refills for her migraine medication  Had one yesterday, and reports she has more than 3 a month  Says it is a tight squeezing sensation  Says she used to be on fioricet and elavil  Unsure which one would be more helpful  Says was prescribed elavil by another provider to relax her overnight to reduce her migraines, however it did make her feel groggy in the morning  She feels it helped her  Sometimes fioricet doesn't help  Has had migraines since approximately 27years old  Had a miscarriage several years ago  Back Pain  This is a new problem  The current episode started more than 1 month ago  The problem occurs daily  The problem has been waxing and waning since onset  The pain is present in the thoracic spine  The quality of the pain is described as aching, burning and shooting  The pain does not radiate  The pain is at a severity of 6/10  The pain is worse during the day  The symptoms are aggravated by sitting  Stiffness is present all day  Associated symptoms include bladder incontinence, headaches, paresthesias and tingling  Pertinent negatives include no abdominal pain, bowel incontinence, chest pain, dysuria, fever, leg pain, numbness, paresis, pelvic pain, perianal numbness, weakness or weight loss  Risk factors include lack of exercise and obesity         The following portions of the patient's history were reviewed and updated as appropriate:   She  has a past medical history of Acid reflux disease, Allergic rhinitis, seasonal, Asthma, Hiatal hernia, Migraine, and Ulcer of gastroesophageal junction  She   Patient Active Problem List    Diagnosis Date Noted   • Screening mammogram for breast cancer 11/04/2022   • Absent menses 11/04/2022   • Acute right-sided thoracic back pain 11/04/2022   • Urinary, incontinence, stress female 11/04/2022   • Memory problem 11/04/2022   • Abrasion 11/04/2022   • Chronic fatigue 11/04/2022   • Memory loss 11/04/2022   • Screening for lipid disorders 11/04/2022   • Screening for diabetes mellitus 11/04/2022   • Tendinopathy of rotator cuff, right 06/10/2021   • Screening for breast cancer 11/22/2019   • Migraine 10/18/2018   • Gastroesophageal reflux disease without esophagitis 08/31/2018     She  has a past surgical history that includes [place] tooth extraction and pr egd transoral biopsy single/multiple (N/A, 2/5/2019)  Her family history includes Anxiety disorder in her brother, daughter, mother, and sister; Asthma in her son; Diabetes in her maternal grandmother; Hyperlipidemia in her mother; No Known Problems in her brother, brother, brother, maternal grandfather, paternal grandfather, paternal grandmother, and son  She  reports that she has never smoked  She has never used smokeless tobacco  She reports previous alcohol use  She reports that she does not use drugs    Current Outpatient Medications   Medication Sig Dispense Refill   • amitriptyline (ELAVIL) 25 mg tablet Take 1 tablet (25 mg total) by mouth daily at bedtime 30 tablet 3   • butalbital-acetaminophen-caffeine (FIORICET,ESGIC) -40 mg per tablet Take 1 tablet by mouth every 4 (four) hours as needed for headaches 30 tablet 1   • famotidine (PEPCID) 20 mg tablet Take 1 tablet (20 mg total) by mouth 2 (two) times a day 60 tablet 3   • meclizine (ANTIVERT) 25 mg tablet Take 1 tablet (25 mg total) by mouth every 8 (eight) hours as needed for dizziness for up to 10 days 30 tablet 0   • methocarbamol (ROBAXIN) 500 mg tablet Take 1 tablet (500 mg total) by mouth 2 (two) times a day as needed for muscle spasms for up to 14 days 28 tablet 0   • mupirocin (BACTROBAN) 2 % ointment Apply topically 3 (three) times a day 22 g 0     No current facility-administered medications for this visit  She has No Known Allergies       Review of Systems   Constitutional: Negative  Negative for activity change, appetite change, chills, diaphoresis, fatigue, fever, unexpected weight change and weight loss  HENT: Positive for congestion, rhinorrhea and sneezing  Negative for ear pain, hearing loss, postnasal drip, sinus pressure, sinus pain, sore throat, trouble swallowing and voice change  Eyes: Negative  Negative for photophobia, pain, redness and visual disturbance  Respiratory: Negative for apnea, cough, choking, chest tightness, shortness of breath, wheezing and stridor  Cardiovascular: Negative for chest pain, palpitations and leg swelling  Gastrointestinal: Positive for constipation  Negative for abdominal distention, abdominal pain, anal bleeding, blood in stool, bowel incontinence, diarrhea, nausea, rectal pain and vomiting  Reports it is hard to have a bowel movement until it is very urgent, feels like she doesn't empty completely every time   Endocrine: Negative  Genitourinary: Positive for bladder incontinence and menstrual problem  Negative for decreased urine volume, difficulty urinating, dyspareunia, dysuria, enuresis, flank pain, frequency, genital sores, hematuria, pelvic pain, vaginal bleeding, vaginal discharge and vaginal pain  Leaking urine while sneezing; absence of menses x 1 year; frequency and urgency with sometimes little amounts of urine   Musculoskeletal: Positive for back pain  Negative for arthralgias, gait problem, joint swelling, myalgias and neck pain  Right sided thoracic back pain   Skin: Negative  Allergic/Immunologic: Positive for environmental allergies  Neurological: Positive for tingling, headaches and paresthesias  Negative for dizziness, tremors, seizures, syncope, facial asymmetry, speech difficulty, weakness, light-headedness and numbness  Hematological: Negative  Psychiatric/Behavioral: Negative  Negative for behavioral problems and dysphoric mood  Objective:      /68 (BP Location: Left arm, Patient Position: Sitting)   Pulse 74   Temp 97 9 °F (36 6 °C) (Temporal)   Ht 5' 1 5" (1 562 m)   Wt 85 kg (187 lb 6 4 oz)   SpO2 98%   BMI 34 84 kg/m²          Physical Exam  Vitals and nursing note reviewed  Constitutional:       General: She is not in acute distress  Appearance: Normal appearance  She is well-developed  HENT:      Head: Normocephalic and atraumatic  Nose: Nose normal       Mouth/Throat:      Mouth: Mucous membranes are moist    Eyes:      Extraocular Movements: Extraocular movements intact  Conjunctiva/sclera: Conjunctivae normal       Pupils: Pupils are equal, round, and reactive to light  Neck:      Thyroid: No thyromegaly  Cardiovascular:      Rate and Rhythm: Normal rate and regular rhythm  Heart sounds: Normal heart sounds  No murmur heard  Pulmonary:      Effort: Pulmonary effort is normal  No respiratory distress  Breath sounds: Normal breath sounds  No wheezing  Abdominal:      General: Bowel sounds are normal  There is no distension  Palpations: Abdomen is soft  Musculoskeletal:         General: Tenderness present  No swelling  Normal range of motion  Arms:       Cervical back: Normal range of motion  Skin:     General: Skin is warm and dry  Capillary Refill: Capillary refill takes less than 2 seconds  Neurological:      General: No focal deficit present  Mental Status: She is alert and oriented to person, place, and time  Mental status is at baseline  Psychiatric:         Mood and Affect: Mood normal          Behavior: Behavior normal          Thought Content:  Thought content normal

## 2022-11-09 ENCOUNTER — TELEPHONE (OUTPATIENT)
Dept: OBGYN CLINIC | Facility: CLINIC | Age: 47
End: 2022-11-09

## 2022-11-09 DIAGNOSIS — R58 BLEEDING: Primary | ICD-10-CM

## 2022-11-09 NOTE — TELEPHONE ENCOUNTER
Patient hasn't had her menses for a year and she is now experiencing heavy bleeding with sharp pains - I scheduled an appt with [facility] for Friday 11/11

## 2022-11-09 NOTE — TELEPHONE ENCOUNTER
I am not sure if pt is  or not. She said she has gone 319 days and lmp was 12/21. Does not make sense. She has used 4 pads so far this morning. Cannot take advil. She has an appt to see you on the 18th. Do you want any testing prior? Thanks

## 2022-11-10 NOTE — TELEPHONE ENCOUNTER
If she is soaking through more than 1 pad every 1/2 hour - 1 hour and experiencing severe pain she should go to the ER. We can check a CBC, and hcg quant.

## 2022-11-11 ENCOUNTER — APPOINTMENT (OUTPATIENT)
Dept: LAB | Facility: CLINIC | Age: 47
End: 2022-11-11

## 2022-11-11 DIAGNOSIS — R53.82 CHRONIC FATIGUE: ICD-10-CM

## 2022-11-11 DIAGNOSIS — Z13.1 SCREENING FOR DIABETES MELLITUS: ICD-10-CM

## 2022-11-11 DIAGNOSIS — R41.3 MEMORY LOSS: ICD-10-CM

## 2022-11-11 DIAGNOSIS — Z13.220 SCREENING FOR LIPID DISORDERS: ICD-10-CM

## 2022-11-11 DIAGNOSIS — R58 BLEEDING: ICD-10-CM

## 2022-11-11 LAB
ALBUMIN SERPL BCP-MCNC: 3.5 G/DL (ref 3.5–5)
ALP SERPL-CCNC: 91 U/L (ref 46–116)
ALT SERPL W P-5'-P-CCNC: 22 U/L (ref 12–78)
ANION GAP SERPL CALCULATED.3IONS-SCNC: 3 MMOL/L (ref 4–13)
AST SERPL W P-5'-P-CCNC: 15 U/L (ref 5–45)
B-HCG SERPL-ACNC: <2 MIU/ML
BASOPHILS # BLD AUTO: 0.07 THOUSANDS/ÂΜL (ref 0–0.1)
BASOPHILS NFR BLD AUTO: 1 % (ref 0–1)
BILIRUB SERPL-MCNC: 0.68 MG/DL (ref 0.2–1)
BUN SERPL-MCNC: 14 MG/DL (ref 5–25)
CALCIUM SERPL-MCNC: 8.9 MG/DL (ref 8.3–10.1)
CHLORIDE SERPL-SCNC: 112 MMOL/L (ref 96–108)
CHOLEST SERPL-MCNC: 194 MG/DL
CO2 SERPL-SCNC: 25 MMOL/L (ref 21–32)
CREAT SERPL-MCNC: 0.79 MG/DL (ref 0.6–1.3)
EOSINOPHIL # BLD AUTO: 0.48 THOUSAND/ÂΜL (ref 0–0.61)
EOSINOPHIL NFR BLD AUTO: 9 % (ref 0–6)
ERYTHROCYTE [DISTWIDTH] IN BLOOD BY AUTOMATED COUNT: 13.2 % (ref 11.6–15.1)
EST. AVERAGE GLUCOSE BLD GHB EST-MCNC: 97 MG/DL
GFR SERPL CREATININE-BSD FRML MDRD: 90 ML/MIN/1.73SQ M
GLUCOSE P FAST SERPL-MCNC: 98 MG/DL (ref 65–99)
HBA1C MFR BLD: 5 %
HCT VFR BLD AUTO: 41.7 % (ref 34.8–46.1)
HDLC SERPL-MCNC: 51 MG/DL
HGB BLD-MCNC: 13.2 G/DL (ref 11.5–15.4)
IMM GRANULOCYTES # BLD AUTO: 0.01 THOUSAND/UL (ref 0–0.2)
IMM GRANULOCYTES NFR BLD AUTO: 0 % (ref 0–2)
LDLC SERPL CALC-MCNC: 127 MG/DL (ref 0–100)
LYMPHOCYTES # BLD AUTO: 1.53 THOUSANDS/ÂΜL (ref 0.6–4.47)
LYMPHOCYTES NFR BLD AUTO: 29 % (ref 14–44)
MCH RBC QN AUTO: 28.5 PG (ref 26.8–34.3)
MCHC RBC AUTO-ENTMCNC: 31.7 G/DL (ref 31.4–37.4)
MCV RBC AUTO: 90 FL (ref 82–98)
MONOCYTES # BLD AUTO: 0.29 THOUSAND/ÂΜL (ref 0.17–1.22)
MONOCYTES NFR BLD AUTO: 6 % (ref 4–12)
NEUTROPHILS # BLD AUTO: 2.84 THOUSANDS/ÂΜL (ref 1.85–7.62)
NEUTS SEG NFR BLD AUTO: 55 % (ref 43–75)
NRBC BLD AUTO-RTO: 0 /100 WBCS
PLATELET # BLD AUTO: 267 THOUSANDS/UL (ref 149–390)
PMV BLD AUTO: 10.2 FL (ref 8.9–12.7)
POTASSIUM SERPL-SCNC: 4.1 MMOL/L (ref 3.5–5.3)
PROT SERPL-MCNC: 7.3 G/DL (ref 6.4–8.4)
RBC # BLD AUTO: 4.63 MILLION/UL (ref 3.81–5.12)
SODIUM SERPL-SCNC: 140 MMOL/L (ref 135–147)
TRIGL SERPL-MCNC: 81 MG/DL
TSH SERPL DL<=0.05 MIU/L-ACNC: 2.28 UIU/ML (ref 0.45–4.5)
WBC # BLD AUTO: 5.22 THOUSAND/UL (ref 4.31–10.16)

## 2022-11-18 ENCOUNTER — OFFICE VISIT (OUTPATIENT)
Dept: OBGYN CLINIC | Facility: CLINIC | Age: 47
End: 2022-11-18

## 2022-11-18 VITALS
SYSTOLIC BLOOD PRESSURE: 102 MMHG | HEIGHT: 62 IN | DIASTOLIC BLOOD PRESSURE: 70 MMHG | WEIGHT: 187.2 LBS | BODY MASS INDEX: 34.45 KG/M2

## 2022-11-18 DIAGNOSIS — H81.11 BENIGN PAROXYSMAL POSITIONAL VERTIGO OF RIGHT EAR: ICD-10-CM

## 2022-11-18 DIAGNOSIS — N93.9 ABNORMAL UTERINE BLEEDING (AUB): Primary | ICD-10-CM

## 2022-11-18 DIAGNOSIS — R11.2 NAUSEA AND VOMITING, UNSPECIFIED VOMITING TYPE: ICD-10-CM

## 2022-11-18 RX ORDER — MECLIZINE HYDROCHLORIDE 25 MG/1
25 TABLET ORAL EVERY 8 HOURS PRN
Qty: 30 TABLET | Refills: 0 | Status: SHIPPED | OUTPATIENT
Start: 2022-11-18 | End: 2022-11-28

## 2022-11-18 NOTE — ASSESSMENT & PLAN NOTE
-LMP prior to this was 11/27/21- almost 1 year without cycle  discussed EMB and Pelvic US  Recommended patient have EMB today but she is apprehensive at today's visit  Would like to get pelvic US first  Advised patient depending on US results may need to come back for EMB

## 2022-11-18 NOTE — PROGRESS NOTES
Assessment/Plan:    Abnormal uterine bleeding (AUB)  -LMP prior to this was 11/27/21- almost 1 year without cycle  discussed EMB and Pelvic US  Recommended patient have EMB today but she is apprehensive at today's visit  Would like to get pelvic US first  Advised patient depending on US results may need to come back for EMB  Diagnoses and all orders for this visit:    Abnormal uterine bleeding (AUB)  -     US pelvis complete w transvaginal; Future          Subjective:      Patient ID: David Grullon is a 41 y o  female here for evaluation of abnormal uterine bleeding  patient reports heavy menstrual bleeding requiring her to change pads every 2 hours from 11/11- 11/16  she reports intense bilateral pelvic pain during bleeding episode also  Both her period and pelvic pain stopped 2 days ago  Her LMP prior to this was 11/27/21  She is sexually active  Serum hcg negative  TSH normal on recent labs  She is not currently taking any hormonal contraception  Denies any recent illness, vaccines, or increased stress  The following portions of the patient's history were reviewed and updated as appropriate:   She  has a past medical history of Acid reflux disease, Allergic rhinitis, seasonal, Asthma, Hiatal hernia, Migraine, and Ulcer of gastroesophageal junction    She   Patient Active Problem List    Diagnosis Date Noted   • Abnormal uterine bleeding (AUB) 11/18/2022   • Screening mammogram for breast cancer 11/04/2022   • Absent menses 11/04/2022   • Acute right-sided thoracic back pain 11/04/2022   • Urinary, incontinence, stress female 11/04/2022   • Memory problem 11/04/2022   • Abrasion 11/04/2022   • Chronic fatigue 11/04/2022   • Memory loss 11/04/2022   • Screening for lipid disorders 11/04/2022   • Screening for diabetes mellitus 11/04/2022   • Tendinopathy of rotator cuff, right 06/10/2021   • Screening for breast cancer 11/22/2019   • Migraine 10/18/2018   • Gastroesophageal reflux disease without esophagitis 08/31/2018     She  has a past surgical history that includes [place] tooth extraction and pr egd transoral biopsy single/multiple (N/A, 2/5/2019)  Her family history includes Anxiety disorder in her brother, daughter, mother, and sister; Asthma in her son; Diabetes in her maternal grandmother; Hyperlipidemia in her mother; No Known Problems in her brother, brother, brother, maternal grandfather, paternal grandfather, paternal grandmother, and son  She  reports that she has never smoked  She has never used smokeless tobacco  She reports that she does not currently use alcohol  She reports that she does not use drugs  Current Outpatient Medications   Medication Sig Dispense Refill   • amitriptyline (ELAVIL) 25 mg tablet Take 1 tablet (25 mg total) by mouth daily at bedtime 30 tablet 3   • famotidine (PEPCID) 20 mg tablet Take 1 tablet (20 mg total) by mouth 2 (two) times a day 60 tablet 3   • meclizine (ANTIVERT) 25 mg tablet Take 1 tablet (25 mg total) by mouth every 8 (eight) hours as needed for dizziness for up to 10 days 30 tablet 0   • methocarbamol (ROBAXIN) 500 mg tablet Take 1 tablet (500 mg total) by mouth 2 (two) times a day as needed for muscle spasms for up to 14 days 28 tablet 0   • mupirocin (BACTROBAN) 2 % ointment Apply topically 3 (three) times a day 22 g 0   • butalbital-acetaminophen-caffeine (FIORICET,ESGIC) -40 mg per tablet Take 1 tablet by mouth every 4 (four) hours as needed for headaches (Patient not taking: Reported on 11/18/2022) 30 tablet 1     No current facility-administered medications for this visit  She has No Known Allergies       Review of Systems   Constitutional: Negative for chills, fatigue and fever  Respiratory: Negative for shortness of breath  Cardiovascular: Negative for chest pain  Gastrointestinal: Negative for abdominal pain, constipation, diarrhea, nausea and vomiting  Endocrine: Negative for cold intolerance and heat intolerance  Genitourinary: Positive for menstrual problem, pelvic pain and vaginal bleeding  Negative for dyspareunia, dysuria, flank pain, frequency, vaginal discharge and vaginal pain  Musculoskeletal: Negative for back pain, myalgias and neck pain  Skin: Negative for pallor and rash  Neurological: Negative for dizziness, light-headedness and headaches  Hematological: Negative for adenopathy  Does not bruise/bleed easily  Psychiatric/Behavioral: Negative for dysphoric mood  Objective:      /70 (BP Location: Left arm, Patient Position: Sitting, Cuff Size: Large)   Ht 5' 1 5" (1 562 m)   Wt 84 9 kg (187 lb 3 2 oz)   BMI 34 80 kg/m²          Physical Exam  Vitals and nursing note reviewed  Constitutional:       General: She is not in acute distress  Appearance: Normal appearance  She is not ill-appearing  HENT:      Head: Normocephalic and atraumatic  Eyes:      Conjunctiva/sclera: Conjunctivae normal    Pulmonary:      Effort: Pulmonary effort is normal    Abdominal:      Palpations: Abdomen is soft  Tenderness: There is no abdominal tenderness  Genitourinary:     General: Normal vulva  Exam position: Lithotomy position  Labia:         Right: No rash, tenderness, lesion or injury  Left: No rash, tenderness, lesion or injury  Urethra: No prolapse, urethral pain, urethral swelling or urethral lesion  Vagina: No signs of injury and foreign body  No vaginal discharge, erythema, tenderness, bleeding, lesions or prolapsed vaginal walls  Cervix: No cervical motion tenderness, discharge, friability, lesion, erythema, cervical bleeding or eversion  Uterus: Not deviated, not enlarged, not fixed, not tender and no uterine prolapse  Adnexa:         Right: No mass, tenderness or fullness  Left: No mass, tenderness or fullness  Musculoskeletal:         General: Normal range of motion  Cervical back: Neck supple     Lymphadenopathy: Lower Body: No right inguinal adenopathy  No left inguinal adenopathy  Skin:     General: Skin is warm and dry  Neurological:      General: No focal deficit present  Mental Status: She is alert     Psychiatric:         Mood and Affect: Mood normal          Behavior: Behavior normal

## 2022-11-18 NOTE — PROGRESS NOTES
C/o heavy bleeding that began 11/11/22 - 11/16/22  States she does not get a period and her last LMP was 11/27/2021

## 2022-11-18 NOTE — PATIENT INSTRUCTIONS
Abnormal (Dysfunctional) Uterine Bleeding   WHAT YOU NEED TO KNOW:   Abnormal uterine bleeding (AUB) is uterine bleeding that is not usual for you  It may also be called dysfunctional uterine bleeding  You may have bleeding from your uterus at times other than your normal monthly period  Your monthly periods may last longer or shorter, and bleeding may be heavier or lighter than usual  AUB can be acute (lasting a short time) or chronic (lasting longer than 6 months)  DISCHARGE INSTRUCTIONS:   Return to the emergency department if:   You continue to bleed heavily, or you feel faint  Call your doctor or gynecologist if:   You need to change your sanitary pad or tampon more than 1 time each hour  Your medicine causes nausea, vomiting, or diarrhea  You have questions or concerns about your condition or care  Medicines: You may need any of the following:  Hormones  help decrease bleeding by making your monthly periods more regular  Sometimes this medicine may be given as birth control pills  Iron supplements  may be given if your blood iron level decreases because of heavy bleeding  Iron may make you constipated  Ask your healthcare provider for ways to prevent or treat constipation  Iron may also make your bowel movements turn dark or black  Take your medicine as directed  Contact your healthcare provider if you think your medicine is not helping or if you have side effects  Tell him or her if you are allergic to any medicine  Keep a list of the medicines, vitamins, and herbs you take  Include the amounts, and when and why you take them  Bring the list or the pill bottles to follow-up visits  Carry your medicine list with you in case of an emergency  Self-care:   Apply heat on your lower abdomen to decrease pain and muscle spasms  Apply heat for 20 to 30 minutes every 2 hours for as many days as directed  Include foods high in iron if needed    Examples of foods high in iron are leafy green vegetables, beef, pork, liver, eggs, and whole-grain breads and cereals  Keep a diary of your menstrual cycles  Keep track of the number of tampons or pads you use each day  Talk to your healthcare provider before you start a weight loss program   You may need to wait until the abnormal bleeding has stopped before you try to lose weight  The amount of iron in your blood should be normal before you lose weight  Ask your provider if weight loss will help your AUB  He or she can tell you what weight is healthy for you  He or she can help you create a safe weight loss plan, if needed  Follow up with your doctor or gynecologist as directed: You may need to return in 4 to 6 months so your provider knows if the AUB has stopped  Bring the diary of your menstrual cycles to your follow-up visits  Write down your questions so you remember to ask them during your visits  © [employer] 2022 Information is for End User's use only and may not be sold, redistributed or otherwise used for commercial purposes  All illustrations and images included in CareNotes® are the copyrighted property of A D A M , Inc  or [facility] Ethel fadi   The above information is an  only  It is not intended as medical advice for individual conditions or treatments  Talk to your doctor, nurse or pharmacist before following any medical regimen to see if it is safe and effective for you  Menopause   WHAT YOU NEED TO KNOW:   What is menopause? Menopause is a normal stage in a woman's life when her monthly periods stop  You are considered to be in menopause when you have not had a period for a full year after the age of 36  Menopause usually occurs between ages 52 to 48  Perimenopause is a stage before menopause that may cause signs and symptoms similar to menopause  Perimenopause may start about 4 years before menopause  What causes menopause?   Menopause starts when the ovaries stop making the female hormones estrogen and progesterone  After menopause, you are no longer able to become pregnant  Any of the following may trigger menopause or early menopause:  Surgery, including a hysterectomy or oophorectomy    Family history of early menopause    Smoking    Chemotherapy or pelvic radiation    Chromosome abnormalities, including Barber syndrome and Fragile X syndrome    Premature ovarian insufficiency (the ovaries stop producing eggs before age 36)    What are the signs and symptoms of menopause? You may have any of the following:  Irregular menstrual cycles with heavy vaginal bleeding followed by decreased bleeding until it stops    Hot flashes (feeling warm, flushed, and sweaty)    Vaginal changes such as increased dryness    Mood changes such as anxiety, depression, or decreased desire to have sex    Trouble sleeping, joint pain, headaches    Brittle nails, hair on chin or chest where it is normally absent    Decrease in breast size and change in skin texture    Weight gain    How is menopause treated or managed? Hormone replacement therapy (HRT) is medicine that replaces your low hormone levels  HRT contains estrogen and sometimes progestin  HRT has several benefits  HRT helps prevent osteoporosis, which decreases your risk for bone fractures  HRT also protects you from colorectal cancer  HRT also has some risks  HRT increases your risk for breast cancer, blood clots, heart disease, a heart attack, or a stroke  If you are 72 years or older, HRT can also increase your risk for dementia  Your risk for uterine or endometrial cancer is higher if you take estrogen-only HRT  Manage hot flashes  Hot flashes are brief periods of feeling very warm, flushed, and sweaty  Hot flashes can last from a few seconds to several minutes  They may happen many times during the day, and are common at night  Layer your clothing so that you can easily remove some clothing and cool yourself during a hot flash   Cold drinks may also be helpful  Non-hormone medicines can help relieve or prevent hot flashes  Examples include certain antidepressants, nerve medicines, and high blood pressure medicines  Reduce vaginal dryness by using over-the-counter vaginal creams  Vaginal dryness may cause you to have pain or discomfort during sex  Only use creams that are made for vaginal use  Do  not  use petroleum jelly  You may put an estrogen cream in and around your vagina  Estrogen cream may help decrease vaginal dryness and lower your risk of vaginal infections  Continue to use birth control during perimenopause if you do not want to get pregnant  You may need to use birth control until it has been 1 year since your periods stopped  Ask your healthcare provider when you can stop using birth control to prevent pregnancy  How can I live a healthy lifestyle during and after menopause? After menopause, your risk for heart disease and bone loss increases  Ask about these and other ways to stay healthy:  Exercise regularly  Exercise helps you maintain a healthy weight  Exercise can also help to control your blood pressure and cholesterol levels  Include weight-bearing exercise for strong bones  Weight bearing exercise is recommended for at least 30 minutes, 3 times a week  Ask your healthcare provider about the best exercise plan for you  Eat a variety of healthy foods  Include fruits, vegetables, whole grains (whole-wheat bread, pasta, and cereals), low-fat dairy, and lean protein foods (beans, poultry, and fish)  Limit foods high in sodium (salt)  Ask your healthcare provider for more information about a meal plan that is right for you  Do not smoke  If you smoke, it is never too late to quit  You are more likely to have a heart attack, lung disease, blood clots, and cancer if you smoke  Ask your healthcare provider for information if you need help quitting  Take supplements as directed    You may need extra calcium and vitamin D to help prevent osteoporosis  Limit alcohol and caffeine  Alcohol and caffeine may worsen your symptoms  When should I call my doctor? You have vaginal bleeding after menopause  You have questions or concerns about your condition or care  CARE AGREEMENT:   You have the right to help plan your care  Learn about your health condition and how it may be treated  Discuss treatment options with your healthcare providers to decide what care you want to receive  You always have the right to refuse treatment  The above information is an  only  It is not intended as medical advice for individual conditions or treatments  Talk to your doctor, nurse or pharmacist before following any medical regimen to see if it is safe and effective for you  © Copyright [employer] 2022 Information is for End User's use only and may not be sold, redistributed or otherwise used for commercial purposes   All illustrations and images included in CareNotes® are the copyrighted property of [employer] , Inc  or [address] [place] [address] [email or web address]

## 2022-11-25 ENCOUNTER — APPOINTMENT (OUTPATIENT)
Dept: RADIOLOGY | Facility: MEDICAL CENTER | Age: 47
End: 2022-11-25

## 2022-11-25 ENCOUNTER — HOSPITAL ENCOUNTER (OUTPATIENT)
Dept: RADIOLOGY | Facility: MEDICAL CENTER | Age: 47
Discharge: HOME | End: 2022-11-25

## 2022-11-25 VITALS — HEIGHT: 62 IN | WEIGHT: 187 LBS | BODY MASS INDEX: 34.41 KG/M2

## 2022-11-25 DIAGNOSIS — Z12.31 SCREENING MAMMOGRAM FOR BREAST CANCER: ICD-10-CM

## 2022-11-26 DIAGNOSIS — G43.809 OTHER MIGRAINE WITHOUT STATUS MIGRAINOSUS, NOT INTRACTABLE: ICD-10-CM

## 2022-11-28 RX ORDER — AMITRIPTYLINE HYDROCHLORIDE 25 MG/1
TABLET, FILM COATED ORAL
Qty: 90 TABLET | Refills: 2 | Status: SHIPPED | OUTPATIENT
Start: 2022-11-28

## 2022-12-15 ENCOUNTER — HOSPITAL ENCOUNTER (OUTPATIENT)
Dept: RADIOLOGY | Facility: MEDICAL CENTER | Age: 47
Discharge: HOME | End: 2022-12-15

## 2022-12-15 DIAGNOSIS — N93.9 ABNORMAL UTERINE BLEEDING (AUB): ICD-10-CM

## 2022-12-21 ENCOUNTER — TELEPHONE (OUTPATIENT)
Dept: OBGYN CLINIC | Facility: CLINIC | Age: 47
End: 2022-12-21

## 2022-12-21 NOTE — TELEPHONE ENCOUNTER
Pt has significant findings on u/s and charly not here but will be here tomorrow  [place]  [birth year]  Chart will follow

## 2022-12-22 ENCOUNTER — TELEPHONE (OUTPATIENT)
Dept: OBGYN CLINIC | Facility: CLINIC | Age: 47
End: 2022-12-22

## 2022-12-22 NOTE — TELEPHONE ENCOUNTER
----- Message from Nadira Joaquin sent at 12/22/2022  8:25 AM EST -----  Please schedule patient for EMB  Mychart message sent with results  Recommend taking ibuprofen prior to appointment to help with any cramping from EMB

## 2022-12-22 NOTE — TELEPHONE ENCOUNTER
----- Message from Carina Retana sent at 12/22/2022  8:49 AM EST -----  Regarding: Test results  Contact: [phone]  What is a benign finding?  And I'll be expecting the call to schedule appt

## 2023-01-03 PROBLEM — Z13.220 SCREENING FOR LIPID DISORDERS: Status: RESOLVED | Noted: 2022-11-04 | Resolved: 2023-01-03

## 2023-01-03 PROBLEM — Z13.1 SCREENING FOR DIABETES MELLITUS: Status: RESOLVED | Noted: 2022-11-04 | Resolved: 2023-01-03

## 2023-01-31 ENCOUNTER — PROCEDURE VISIT (OUTPATIENT)
Dept: OBGYN CLINIC | Facility: CLINIC | Age: 48
End: 2023-01-31

## 2023-01-31 VITALS
BODY MASS INDEX: 34.89 KG/M2 | SYSTOLIC BLOOD PRESSURE: 102 MMHG | DIASTOLIC BLOOD PRESSURE: 68 MMHG | WEIGHT: 189.6 LBS | HEIGHT: 62 IN

## 2023-01-31 DIAGNOSIS — R93.89 THICKENED ENDOMETRIUM: ICD-10-CM

## 2023-01-31 DIAGNOSIS — Z32.02 NEGATIVE PREGNANCY TEST: ICD-10-CM

## 2023-01-31 DIAGNOSIS — N93.9 ABNORMAL UTERINE BLEEDING (AUB): Primary | ICD-10-CM

## 2023-01-31 LAB — SL AMB POCT URINE HCG: NORMAL

## 2023-01-31 NOTE — PATIENT INSTRUCTIONS
Menopause   WHAT YOU NEED TO KNOW:   What is menopause? Menopause is a normal stage in a woman's life when her monthly periods stop  You are considered to be in menopause when you have not had a period for a full year after the age of 36  Menopause usually occurs between ages 52 to 48  Perimenopause is a stage before menopause that may cause signs and symptoms similar to menopause  Perimenopause may start about 4 years before menopause  What causes menopause? Menopause starts when the ovaries stop making the female hormones estrogen and progesterone  After menopause, you are no longer able to become pregnant  Any of the following may trigger menopause or early menopause:  Surgery, including a hysterectomy or oophorectomy    Family history of early menopause    Smoking    Chemotherapy or pelvic radiation    Chromosome abnormalities, including Barber syndrome and Fragile X syndrome    Premature ovarian insufficiency (the ovaries stop producing eggs before age 36)    What are the signs and symptoms of menopause? You may have any of the following:  Irregular menstrual cycles with heavy vaginal bleeding followed by decreased bleeding until it stops    Hot flashes (feeling warm, flushed, and sweaty)    Vaginal changes such as increased dryness    Mood changes such as anxiety, depression, or decreased desire to have sex    Trouble sleeping, joint pain, headaches    Brittle nails, hair on chin or chest where it is normally absent    Decrease in breast size and change in skin texture    Weight gain    How is menopause treated or managed? Hormone replacement therapy (HRT) is medicine that replaces your low hormone levels  HRT contains estrogen and sometimes progestin  HRT has several benefits  HRT helps prevent osteoporosis, which decreases your risk for bone fractures  HRT also protects you from colorectal cancer  HRT also has some risks    HRT increases your risk for breast cancer, blood clots, heart disease, a heart attack, or a stroke  If you are 72 years or older, HRT can also increase your risk for dementia  Your risk for uterine or endometrial cancer is higher if you take estrogen-only HRT  Manage hot flashes  Hot flashes are brief periods of feeling very warm, flushed, and sweaty  Hot flashes can last from a few seconds to several minutes  They may happen many times during the day, and are common at night  Layer your clothing so that you can easily remove some clothing and cool yourself during a hot flash  Cold drinks may also be helpful  Non-hormone medicines can help relieve or prevent hot flashes  Examples include certain antidepressants, nerve medicines, and high blood pressure medicines  Reduce vaginal dryness by using over-the-counter vaginal creams  Vaginal dryness may cause you to have pain or discomfort during sex  Only use creams that are made for vaginal use  Do  not  use petroleum jelly  You may put an estrogen cream in and around your vagina  Estrogen cream may help decrease vaginal dryness and lower your risk of vaginal infections  Continue to use birth control during perimenopause if you do not want to get pregnant  You may need to use birth control until it has been 1 year since your periods stopped  Ask your healthcare provider when you can stop using birth control to prevent pregnancy  How can I live a healthy lifestyle during and after menopause? After menopause, your risk for heart disease and bone loss increases  Ask about these and other ways to stay healthy:  Exercise regularly  Exercise helps you maintain a healthy weight  Exercise can also help to control your blood pressure and cholesterol levels  Include weight-bearing exercise for strong bones  Weight bearing exercise is recommended for at least 30 minutes, 3 times a week  Ask your healthcare provider about the best exercise plan for you  Eat a variety of healthy foods    Include fruits, vegetables, whole grains (whole-wheat bread, pasta, and cereals), low-fat dairy, and lean protein foods (beans, poultry, and fish)  Limit foods high in sodium (salt)  Ask your healthcare provider for more information about a meal plan that is right for you  Do not smoke  If you smoke, it is never too late to quit  You are more likely to have a heart attack, lung disease, blood clots, and cancer if you smoke  Ask your healthcare provider for information if you need help quitting  Take supplements as directed  You may need extra calcium and vitamin D to help prevent osteoporosis  Limit alcohol and caffeine  Alcohol and caffeine may worsen your symptoms  When should I call my doctor? You have vaginal bleeding after menopause  You have questions or concerns about your condition or care  CARE AGREEMENT:   You have the right to help plan your care  Learn about your health condition and how it may be treated  Discuss treatment options with your healthcare providers to decide what care you want to receive  You always have the right to refuse treatment  The above information is an  only  It is not intended as medical advice for individual conditions or treatments  Talk to your doctor, nurse or pharmacist before following any medical regimen to see if it is safe and effective for you  © Copyright [employer] 2022 Information is for End User's use only and may not be sold, redistributed or otherwise used for commercial purposes  All illustrations and images included in CareNotes® are the copyrighted property of A D A M , Inc  or [facility] Ethel Collins    Endometrial Biopsy   WHAT YOU NEED TO KNOW:   Endometrial biopsy is a procedure to remove a tissue sample from the lining of your uterus  This procedure is done through your vagina  DISCHARGE INSTRUCTIONS:   Call your doctor or surgeon if:   You have severe pain that does not go away after you take pain medicine      You have a fever     You have pain or cramping that lasts longer than a few days  You have white or yellow vaginal discharge  You have more vaginal bleeding than you were told to expect  You have questions or concerns about your condition or care  Medicines:   NSAIDs , such as ibuprofen, help decrease swelling, pain, and fever  NSAIDs can cause stomach bleeding or kidney problems in certain people  If you take blood thinner medicine, always ask your healthcare provider if NSAIDs are safe for you  Always read the medicine label and follow directions  Take your medicine as directed  Contact your healthcare provider if you think your medicine is not helping or if you have side effects  Tell him or her if you are allergic to any medicine  Keep a list of the medicines, vitamins, and herbs you take  Include the amounts, and when and why you take them  Bring the list or the pill bottles to follow-up visits  Carry your medicine list with you in case of an emergency  Do not have sex, douche, or use a tampon  for at least 10 to 14 days  Follow up with your doctor or surgeon as directed:  Write down your questions so you remember to ask them during your visits  © Copyright [employer] 2022 Information is for End User's use only and may not be sold, redistributed or otherwise used for commercial purposes  All illustrations and images included in CareNotes® are the copyrighted property of [employer] M , Inc  or [facility] Ethel Collins  The above information is an  only  It is not intended as medical advice for individual conditions or treatments  Talk to your doctor, nurse or pharmacist before following any medical regimen to see if it is safe and effective for you

## 2023-01-31 NOTE — PROGRESS NOTES
Endometrial biopsy    Date/Time: 1/31/2023 8:30 AM  Performed by: QUITA Aiken  Authorized by: QUITA Aiken   Universal Protocol:  Procedure performed by:  Consent: Verbal consent obtained  Written consent not obtained  Risks and benefits: risks, benefits and alternatives were discussed  Consent given by: patient  Time out: Immediately prior to procedure a "time out" was called to verify the correct patient, procedure, equipment, support staff and site/side marked as required  Patient understanding: patient states understanding of the procedure being performed  Patient consent: the patient's understanding of the procedure matches consent given  Procedure consent: procedure consent matches procedure scheduled  Relevant documents: relevant documents not present or verified  Test results: test results not available  Site marked: the operative site was not marked  Radiology Images displayed and confirmed  If images not available, report reviewed: imaging studies not available  Patient identity confirmed: verbally with patient      Indication:     Indications: Other disorder of menstruation and other abnormal bleeding from female genital tract    Procedure:     Procedure: endometrial biopsy with Pipelle      A bivalve speculum was placed in the vagina: yes      Cervix cleaned and prepped: yes      A paracervical block was performed: no      An intracervical block was performed: no      The cervix was dilated: yes      Uterus sounded: yes      Uterus sound depth (cm):  7    Curettes used:  2    Specimen collected: specimen collected and sent to pathology      Patient tolerated procedure well with no complications: yes    Findings:     Uterus size:  Non-gravid    Cervix: normal    Comments:     Procedure comments:  Procedure explained to patient consent form obtained  Procedure completed without difficulty    Hemostasis appreciated  Patient advised nothing in the vagina for 1 week  Patient advised she may have bleeding or spotting for the next several days    Patient advised to take Tylenol or ibuprofen for cramping if necessary  Patient advised that we will call with biopsy results

## 2023-02-03 ENCOUNTER — TELEPHONE (OUTPATIENT)
Dept: OBGYN CLINIC | Facility: CLINIC | Age: 48
End: 2023-02-03

## 2023-02-03 NOTE — TELEPHONE ENCOUNTER
Spoke with pt, results reviewed  Pt may continue to monitor bleeding patterns   If bleeding continues and is concerning to her she may opt for D&C,   Advised to call office and update as needed   Pt grateful for phone call

## 2023-02-08 ENCOUNTER — TELEMEDICINE (OUTPATIENT)
Dept: FAMILY MEDICINE CLINIC | Facility: CLINIC | Age: 48
End: 2023-02-08

## 2023-02-08 DIAGNOSIS — R09.81 NASAL CONGESTION: Primary | ICD-10-CM

## 2023-02-08 RX ORDER — FLUTICASONE PROPIONATE 50 MCG
1 SPRAY, SUSPENSION (ML) NASAL DAILY
Qty: 16 G | Refills: 0 | Status: SHIPPED | OUTPATIENT
Start: 2023-02-08

## 2023-02-08 RX ORDER — BROMPHENIRAMINE MALEATE, PSEUDOEPHEDRINE HYDROCHLORIDE, AND DEXTROMETHORPHAN HYDROBROMIDE 2; 30; 10 MG/5ML; MG/5ML; MG/5ML
5 SYRUP ORAL 4 TIMES DAILY PRN
Qty: 120 ML | Refills: 0 | Status: SHIPPED | OUTPATIENT
Start: 2023-02-08

## 2023-02-08 NOTE — PROGRESS NOTES
Virtual Regular Visit    Verification of patient location:    Patient is located in the following state in which I hold an active license [place]      Assessment/Plan:    Problem List Items Addressed This Visit    None  Visit Diagnoses     Nasal congestion    -  Primary    Relevant Medications    brompheniramine-pseudoephedrine-DM 30-2-10 MG/5ML syrup    fluticasone (FLONASE) 50 mcg/act nasal spray        Will COVID swab tomorrow morning pt cannot get here today   Given supportive care instructions        Reason for visit is   Chief Complaint   Patient presents with   • Virtual Regular Visit        Encounter provider Kathleen Baumgarten, PA-C    Provider located at [place] [address]  [address] [place] [id number]      Recent Visits  No visits were found meeting these conditions  Showing recent visits within past 7 days and meeting all other requirements  Today's Visits  Date Type Provider Dept   02/08/23 Telemedicine Manny Araya PA-C [facility]   Showing today's visits and meeting all other requirements  Future Appointments  No visits were found meeting these conditions  Showing future appointments within next 150 days and meeting all other requirements       The patient was identified by name and date of birth  Carina Retana was informed that this is a telemedicine visit and that the visit is being conducted through the [employer]  She agrees to proceed     My office door was closed  No one else was in the room  She acknowledged consent and understanding of privacy and security of the video platform  The patient has agreed to participate and understands they can discontinue the visit at any time  Patient is aware this is a billable service       Subjective  Carina Retana is a 41 y o  female  With cold like sx      Pt presents virtually with sore throat, stuffy nose, cough  Sore throat worse with coughing, PND   Has not taken a COVID test   Taking tylenol sinus        Past Medical History:   Diagnosis Date   • Acid reflux disease    • Allergic rhinitis, seasonal    • Asthma    • Hiatal hernia    • Migraine    • Ulcer of gastroesophageal junction        Past Surgical History:   Procedure Laterality Date   • SD EGD TRANSORAL BIOPSY SINGLE/MULTIPLE N/A 2/5/2019    Procedure: ESOPHAGOGASTRODUODENOSCOPY (EGD); Surgeon: Susana Montoya DO;  Location: [facility]; Service: Gastroenterology   • WISDOM TOOTH EXTRACTION         Current Outpatient Medications   Medication Sig Dispense Refill   • brompheniramine-pseudoephedrine-DM 30-2-10 MG/5ML syrup Take 5 mL by mouth 4 (four) times a day as needed for congestion or cough 120 mL 0   • fluticasone (FLONASE) 50 mcg/act nasal spray 1 spray into each nostril daily 16 g 0   • amitriptyline (ELAVIL) 25 mg tablet TAKE 1 TABLET BY MOUTH DAILY AT BEDTIME 90 tablet 2   • butalbital-acetaminophen-caffeine (FIORICET,ESGIC) -40 mg per tablet Take 1 tablet by mouth every 4 (four) hours as needed for headaches 30 tablet 1   • famotidine (PEPCID) 20 mg tablet Take 1 tablet (20 mg total) by mouth 2 (two) times a day 60 tablet 3   • methocarbamol (ROBAXIN) 500 mg tablet Take 1 tablet (500 mg total) by mouth 2 (two) times a day as needed for muscle spasms for up to 14 days 28 tablet 0   • mupirocin (BACTROBAN) 2 % ointment Apply topically 3 (three) times a day 22 g 0     No current facility-administered medications for this visit  No Known Allergies    Review of Systems   Constitutional: Negative for chills, fatigue and fever  HENT: Positive for congestion, sinus pain and sore throat  Negative for ear pain and trouble swallowing  Eyes: Negative for pain, discharge and redness  Respiratory: Positive for cough  Negative for chest tightness, shortness of breath and wheezing  Cardiovascular: Negative for chest pain, palpitations and leg swelling     Gastrointestinal: Negative for abdominal pain, diarrhea, nausea and vomiting  Musculoskeletal: Negative for arthralgias, joint swelling and myalgias  Skin: Negative for rash  Neurological: Negative for dizziness, weakness, numbness and headaches  Video Exam    There were no vitals filed for this visit  Physical Exam  Vitals and nursing note reviewed  Constitutional:       Appearance: She is well-developed  HENT:      Head: Normocephalic  Right Ear: Hearing and tympanic membrane normal       Left Ear: Hearing and tympanic membrane normal       Nose: No mucosal edema  Mouth/Throat:      Pharynx: Uvula midline  Posterior oropharyngeal erythema present  Cardiovascular:      Rate and Rhythm: Normal rate and regular rhythm  Pulmonary:      Effort: Pulmonary effort is normal       Breath sounds: Normal breath sounds            I spent 12 minutes directly with the patient during this visit

## 2023-03-02 DIAGNOSIS — R09.81 NASAL CONGESTION: ICD-10-CM

## 2023-03-02 RX ORDER — FLUTICASONE PROPIONATE 50 MCG
SPRAY, SUSPENSION (ML) NASAL
Qty: 48 ML | Refills: 1 | Status: SHIPPED | OUTPATIENT
Start: 2023-03-02

## 2023-06-27 ENCOUNTER — TELEPHONE (OUTPATIENT)
Dept: FAMILY MEDICINE CLINIC | Facility: CLINIC | Age: 48
End: 2023-06-27

## 2023-06-27 NOTE — TELEPHONE ENCOUNTER
Hi, my name is Reji Juarez  My date of birth is [birth year]  I've been trying several times to call you guys and someone keeps picking up the phone and I hear people's conversations in the background  I don't know, sometimes the phone is silent  I guess you guys must be having some kind of phone issues, I don't know  But in any case, I have an appointment with you guys sometime in July, but I'm trying to see if I can get an appointment  Maybe if you guys have any cancellations for this week between 8:00 and 99 AM before I start working  The lady I had spoke to the last time, she told me I spoke to her last week  She told me that I will have to call every morning to see if there's any cancellations  So my appointment is actually for back pains, but I am currently experiencing a lot of body itching  Some parts of my body have a rash, you just can't stop itching  So I was trying to get in to see the doctors but I can only go in before I go to work  So if someone can please call me back [phone]  Thank you

## 2023-07-05 ENCOUNTER — OFFICE VISIT (OUTPATIENT)
Dept: FAMILY MEDICINE CLINIC | Facility: CLINIC | Age: 48
End: 2023-07-05
Payer: COMMERCIAL

## 2023-07-05 ENCOUNTER — TELEPHONE (OUTPATIENT)
Dept: FAMILY MEDICINE CLINIC | Facility: CLINIC | Age: 48
End: 2023-07-05

## 2023-07-05 VITALS
TEMPERATURE: 97.4 F | BODY MASS INDEX: 34.78 KG/M2 | WEIGHT: 189 LBS | HEIGHT: 62 IN | HEART RATE: 84 BPM | OXYGEN SATURATION: 99 % | SYSTOLIC BLOOD PRESSURE: 108 MMHG | DIASTOLIC BLOOD PRESSURE: 70 MMHG

## 2023-07-05 DIAGNOSIS — L23.9 ALLERGIC CONTACT DERMATITIS, UNSPECIFIED TRIGGER: ICD-10-CM

## 2023-07-05 DIAGNOSIS — M54.6 CHRONIC RIGHT-SIDED THORACIC BACK PAIN: ICD-10-CM

## 2023-07-05 DIAGNOSIS — G43.809 OTHER MIGRAINE WITHOUT STATUS MIGRAINOSUS, NOT INTRACTABLE: ICD-10-CM

## 2023-07-05 DIAGNOSIS — R11.0 NAUSEA: ICD-10-CM

## 2023-07-05 DIAGNOSIS — G89.29 CHRONIC RIGHT-SIDED THORACIC BACK PAIN: ICD-10-CM

## 2023-07-05 DIAGNOSIS — G89.29 CHRONIC RIGHT-SIDED LOW BACK PAIN WITHOUT SCIATICA: Primary | ICD-10-CM

## 2023-07-05 DIAGNOSIS — M54.50 CHRONIC RIGHT-SIDED LOW BACK PAIN WITHOUT SCIATICA: Primary | ICD-10-CM

## 2023-07-05 PROCEDURE — 99214 OFFICE O/P EST MOD 30 MIN: CPT | Performed by: PHYSICIAN ASSISTANT

## 2023-07-05 RX ORDER — CYCLOBENZAPRINE HCL 5 MG
5 TABLET ORAL 3 TIMES DAILY PRN
Qty: 20 TABLET | Refills: 0 | Status: SHIPPED | OUTPATIENT
Start: 2023-07-05

## 2023-07-05 RX ORDER — FAMOTIDINE 20 MG/1
20 TABLET, FILM COATED ORAL 2 TIMES DAILY
Qty: 60 TABLET | Refills: 3 | Status: SHIPPED | OUTPATIENT
Start: 2023-07-05

## 2023-07-05 RX ORDER — AMITRIPTYLINE HYDROCHLORIDE 25 MG/1
25 TABLET, FILM COATED ORAL
Qty: 90 TABLET | Refills: 2 | Status: SHIPPED | OUTPATIENT
Start: 2023-07-05

## 2023-07-05 RX ORDER — NAPROXEN 500 MG/1
500 TABLET ORAL 2 TIMES DAILY PRN
Qty: 30 TABLET | Refills: 0 | Status: SHIPPED | OUTPATIENT
Start: 2023-07-05

## 2023-07-05 NOTE — PROGRESS NOTES
Name: Ivan White      : 1975      MRN: [id number]  Encounter Provider: Fernanda Rincon PA-C  Encounter Date: 2023   Encounter department: [address] [place] [address]     1. Chronic right-sided low back pain without sciatica  -     Ambulatory Referral to Physical Therapy; Future  -     naproxen (Naprosyn) 500 mg tablet; Take 1 tablet (500 mg total) by mouth 2 (two) times a day as needed for mild pain  -     cyclobenzaprine (FLEXERIL) 5 mg tablet; Take 1 tablet (5 mg total) by mouth 3 (three) times a day as needed for muscle spasms    2. Other migraine without status migrainosus, not intractable  -     amitriptyline (ELAVIL) 25 mg tablet; Take 1 tablet (25 mg total) by mouth daily at bedtime    3. Chronic right-sided thoracic back pain  -     Ambulatory Referral to Physical Therapy; Future  -     naproxen (Naprosyn) 500 mg tablet; Take 1 tablet (500 mg total) by mouth 2 (two) times a day as needed for mild pain  -     cyclobenzaprine (FLEXERIL) 5 mg tablet; Take 1 tablet (5 mg total) by mouth 3 (three) times a day as needed for muscle spasms    4. Allergic contact dermatitis, unspecified trigger    suspect muscular tension/ergonomic or postural strain. Recommend PT, prn flexeril/naproxen. No red flag signs. No radicular sx. Suspect likely contact dermatitis. Rash is improving. Prn claritin/allegra. No rash today. Subjective     Pt presents with concerns of 6 months of intermittent R sided mid/low back pain. No injuries. She works from home and has pain after sitting for a while. She notes improvement with standing, stretching. She was previously getting relief with tylenol/robaxin but now is not helping. Does not radiate. No  sx. No LE weakness/numbness. She also notes "itching" for the past week. Diffuse. She did change a detergent, no other changes. Was associated with fine red discrete rash. She feels it is improving. Taking benadryl but making her tired. Review of Systems   Constitutional: Negative for chills, fatigue and fever. HENT: Negative for congestion, ear pain, hearing loss, nosebleeds, postnasal drip, rhinorrhea, sinus pressure, sinus pain, sneezing and sore throat. Eyes: Negative for pain, discharge, itching and visual disturbance. Respiratory: Negative for cough, chest tightness, shortness of breath and wheezing. Cardiovascular: Negative for chest pain, palpitations and leg swelling. Gastrointestinal: Negative for abdominal pain, blood in stool, constipation, diarrhea, nausea and vomiting. Genitourinary: Negative for frequency and urgency. Musculoskeletal: Positive for back pain. Skin: Positive for rash. Itching     Neurological: Negative for dizziness, light-headedness and numbness. Past Medical History:   Diagnosis Date   • Acid reflux disease    • Allergic rhinitis, seasonal    • Asthma    • Hiatal hernia    • Migraine    • Ulcer of gastroesophageal junction      Past Surgical History:   Procedure Laterality Date   • [place] EGD TRANSORAL BIOPSY SINGLE/MULTIPLE N/A 2/5/2019    Procedure: ESOPHAGOGASTRODUODENOSCOPY (EGD); Surgeon: Reyes Dash DO;  Location: [facility];   Service: Gastroenterology   • WISDOM TOOTH EXTRACTION       Family History   Problem Relation Age of Onset   • Anxiety disorder Mother    • Hyperlipidemia Mother    • Anxiety disorder Sister    • Anxiety disorder Daughter    • Diabetes Maternal Grandmother    • No Known Problems Maternal Grandfather    • No Known Problems Paternal Grandmother    • No Known Problems Paternal Grandfather    • No Known Problems Brother    • No Known Problems Brother    • Anxiety disorder Brother    • No Known Problems Brother    • Asthma Son    • No Known Problems Son    • No Known Problems Maternal Aunt    • No Known Problems Maternal Aunt    • No Known Problems Maternal Aunt    • No Known Problems Maternal Aunt    • No Known Problems Maternal Uncle    • Breast cancer Neg Hx    • Colon cancer Neg Hx    • Ovarian cancer Neg Hx    • Uterine cancer Neg Hx    • Cervical cancer Neg Hx      Social History     Socioeconomic History   • Marital status: /Civil Union     Spouse name: None   • Number of children: None   • Years of education: None   • Highest education level: None   Occupational History   • None   Tobacco Use   • Smoking status: Never   • Smokeless tobacco: Never   Vaping Use   • Vaping Use: Never used   Substance and Sexual Activity   • Alcohol use: Not Currently     Comment: social   • Drug use: No   • Sexual activity: Yes     Partners: Male     Birth control/protection: None     Comment:    Other Topics Concern   • None   Social History Narrative   • None     Social Determinants of Health     Financial Resource Strain: Not on file   Food Insecurity: Not on file   Transportation Needs: Not on file   Physical Activity: Not on file   Stress: Not on file   Social Connections: Not on file   Intimate Partner Violence: Not on file   Housing Stability: Not on file     Current Outpatient Medications on File Prior to Visit   Medication Sig   • butalbital-acetaminophen-caffeine (FIORICET,ESGIC) -40 mg per tablet Take 1 tablet by mouth every 4 (four) hours as needed for headaches   • famotidine (PEPCID) 20 mg tablet Take 1 tablet (20 mg total) by mouth 2 (two) times a day   • fluticasone (FLONASE) 50 mcg/act nasal spray SPRAY 1 SPRAY INTO EACH NOSTRIL EVERY DAY   • [DISCONTINUED] amitriptyline (ELAVIL) 25 mg tablet TAKE 1 TABLET BY MOUTH DAILY AT BEDTIME   • [DISCONTINUED] brompheniramine-pseudoephedrine-DM 30-2-10 MG/5ML syrup Take 5 mL by mouth 4 (four) times a day as needed for congestion or cough   • [DISCONTINUED] methocarbamol (ROBAXIN) 500 mg tablet Take 1 tablet (500 mg total) by mouth 2 (two) times a day as needed for muscle spasms for up to 14 days   • [DISCONTINUED] mupirocin (BACTROBAN) 2 % ointment Apply topically 3 (three) times a day     No Known Allergies  Immunization History   Administered Date(s) Administered   • COVID-19, unspecified 11/06/2021, 01/07/2022       Objective     /70 (BP Location: Left arm, Patient Position: Sitting, Cuff Size: Large)   Pulse 84   Temp (!) 97.4 °F (36.3 °C)   Ht 5' 1.5" (1.562 m)   Wt 85.7 kg (189 lb)   SpO2 99%   BMI 35.13 kg/m²     Physical Exam  Vitals and nursing note reviewed. Constitutional:       General: She is not in acute distress. Appearance: Normal appearance. She is not ill-appearing. HENT:      Head: Normocephalic and atraumatic. Pulmonary:      Effort: Pulmonary effort is normal. No respiratory distress. Musculoskeletal:      Cervical back: Normal range of motion. Thoracic back: Spasms and tenderness present. No signs of trauma or bony tenderness. Normal range of motion. Lumbar back: Tenderness present. No signs of trauma or bony tenderness. Normal range of motion. Negative right straight leg raise test and negative left straight leg raise test.   Skin:     General: Skin is warm and dry. Findings: No erythema or rash. Neurological:      Mental Status: She is alert and oriented to person, place, and time. Sensory: No sensory deficit. Motor: No weakness.        Ana Paula Haynes PA-C

## 2023-08-01 DIAGNOSIS — R11.0 NAUSEA: ICD-10-CM

## 2023-08-01 RX ORDER — FAMOTIDINE 20 MG/1
20 TABLET, FILM COATED ORAL 2 TIMES DAILY
Qty: 180 TABLET | Refills: 2 | Status: SHIPPED | OUTPATIENT
Start: 2023-08-01

## 2023-08-08 ENCOUNTER — TELEPHONE (OUTPATIENT)
Dept: FAMILY MEDICINE CLINIC | Facility: CLINIC | Age: 48
End: 2023-08-08

## 2023-08-08 NOTE — TELEPHONE ENCOUNTER
Lata Mobley prescribed 2 muscle relaxer's. . cyclobenzaprine & naproxen which are not working. . She also adds tylenol and this does bring some relief. . but she is concerned that she may be taking too much medication. . She is asking Lata Mobley what he recommends ? ?

## 2023-08-08 NOTE — TELEPHONE ENCOUNTER
I highly recommend PT, this likely wont resolve with medication alone. The flexeril is a muscle relaxant for spasmodic pain and the naproxen is an antiinflammatory, they are to be taken prn for pain relief. CARDIOLOGY FOLLOW UP - Dr. Clements  Date of Service: 6/28/23  CC: brief episodes of WCT on tele, no clinical change    Review of Systems:  Constitutional: No fever, weight loss, or fatigue  Respiratory: No cough, wheezing, or hemoptysis, no shortness of breath  Cardiovascular: No chest pain, palpitations, passing out, dizziness, or leg swelling  Gastrointestinal: No abd or epigastric pain. No nausea, vomiting, or hematemesis; no diarrhea or consiptaiton, no melena or hematochezia  Vascular: No edema     TELEMETRY:    PHYSICAL EXAM:  T(C): 36.8 (06-28-23 @ 05:40), Max: 37.1 (06-27-23 @ 10:57)  HR: 84 (06-28-23 @ 05:40) (72 - 87)  BP: 97/53 (06-28-23 @ 05:40) (97/53 - 116/46)  RR: 18 (06-28-23 @ 05:40) (17 - 18)  SpO2: 100% (06-28-23 @ 05:40) (99% - 100%)  Wt(kg): --  I&O's Summary    27 Jun 2023 07:01  -  28 Jun 2023 07:00  --------------------------------------------------------  IN: 0 mL / OUT: 641 mL / NET: -641 mL        Appearance: Normal	  Cardiovascular: Normal S1 S2,RRR, No JVD, No murmurs  Respiratory: Lungs clear to auscultation	  Gastrointestinal:  Soft, Non-tender, + BS	  Extremities: Normal range of motion, No clubbing, cyanosis or edema  Vascular: Peripheral pulses palpable 2+ bilaterally       Home Medications:  bisacodyl 5 mg oral tablet: 2 tab(s) orally once a day as needed for  constipation (04 Jun 2023 00:48)  cinacalcet 60 mg oral tablet: 1 tab(s) orally once a day (04 Jun 2023 00:48)  donepezil 5 mg oral tablet: 1 tab(s) orally once a day (at bedtime) (04 Jun 2023 00:48)  ergocalciferol 1.25 mg (50,000 intl units) oral capsule: 1 cap(s) orally once a month (04 Jun 2023 00:48)  meloxicam 15 mg oral tablet: 1 tab(s) orally once a day as needed for  pain (04 Jun 2023 00:48)  NIFEdipine (Eqv-Adalat CC) 30 mg oral tablet, extended release: 2 tab(s) orally once a day (04 Jun 2023 00:48)  sevelamer hydrochloride 800 mg oral tablet: 1 tab(s) orally every 6 hours (04 Jun 2023 00:49)        MEDICATIONS  (STANDING):  aspirin  chewable 81 milliGRAM(s) Oral daily  ceFAZolin   IVPB 2000 milliGRAM(s) IV Intermittent <User Schedule>  ceFAZolin   IVPB 3000 milliGRAM(s) IV Intermittent <User Schedule>  chlorhexidine 2% Cloths 1 Application(s) Topical daily  dextrose 5%. 1000 milliLiter(s) (100 mL/Hr) IV Continuous <Continuous>  dextrose 5%. 1000 milliLiter(s) (50 mL/Hr) IV Continuous <Continuous>  dextrose 50% Injectable 25 Gram(s) IV Push once  dextrose 50% Injectable 12.5 Gram(s) IV Push once  dextrose 50% Injectable 25 Gram(s) IV Push once  donepezil 5 milliGRAM(s) Oral at bedtime  epoetin shell-epbx (RETACRIT) Injectable 26732 Unit(s) IV Push <User Schedule>  heparin   Injectable 5000 Unit(s) SubCutaneous every 8 hours  metoprolol tartrate 25 milliGRAM(s) Oral two times a day  midodrine. 10 milliGRAM(s) Oral <User Schedule>  pantoprazole   Suspension 40 milliGRAM(s) Oral daily        EKG:  RADIOLOGY:  DIAGNOSTIC TESTING:  [ ] Echocardiogram:  [ ] Catherterization:  [ ] Stress Test:  OTHER:     LABS:	 	                          8.2    10.02 )-----------( 275      ( 27 Jun 2023 05:45 )             26.8     06-28    135  |  100  |  52<H>  ----------------------------<  100<H>  4.9   |  24  |  5.19<H>    Ca    9.9      28 Jun 2023 06:30  Phos  4.6     06-28  Mg     2.40     06-28            CARDIAC MARKERS:

## 2023-08-22 ENCOUNTER — EVALUATION (OUTPATIENT)
Dept: PHYSICAL THERAPY | Facility: CLINIC | Age: 48
End: 2023-08-22
Payer: COMMERCIAL

## 2023-08-22 DIAGNOSIS — M54.50 CHRONIC RIGHT-SIDED LOW BACK PAIN WITHOUT SCIATICA: ICD-10-CM

## 2023-08-22 DIAGNOSIS — M54.6 CHRONIC RIGHT-SIDED THORACIC BACK PAIN: Primary | ICD-10-CM

## 2023-08-22 DIAGNOSIS — G89.29 CHRONIC RIGHT-SIDED LOW BACK PAIN WITHOUT SCIATICA: ICD-10-CM

## 2023-08-22 DIAGNOSIS — G89.29 CHRONIC RIGHT-SIDED THORACIC BACK PAIN: Primary | ICD-10-CM

## 2023-08-22 DIAGNOSIS — H81.12 BPPV (BENIGN PAROXYSMAL POSITIONAL VERTIGO), LEFT: ICD-10-CM

## 2023-08-22 PROCEDURE — 97162 PT EVAL MOD COMPLEX 30 MIN: CPT

## 2023-08-22 PROCEDURE — 97112 NEUROMUSCULAR REEDUCATION: CPT

## 2023-08-22 NOTE — PROGRESS NOTES
PT Evaluation     Today's date: 2023  Patient name: Princess Marte  : 1975  MRN: [id number]  Referring provider: Vandana Hurley PA-C  Dx:   Encounter Diagnosis     ICD-10-CM    1. Chronic right-sided thoracic back pain  M54.6 Ambulatory Referral to Physical Therapy    G89.29       2. Chronic right-sided low back pain without sciatica  M54.50 Ambulatory Referral to Physical Therapy    G89.29       3. BPPV (benign paroxysmal positional vertigo), left  H81.12                      Assessment  Assessment details: Princess Marte is a 57 y.o. female presenting to physical therapy for an initial evaluation with a MD referral of chronic right-sided thoracic back pain, chronic right-sided low back pain without sciatica, BPPV (benign paroxysmal positional vertigo), left. The patient demonstrates decreased and painful lumbar and thoracic ROM, and decreased postural awareness and core stabilization. She reports difficulty with sitting for longer than 1-2 hours during her work day and takes frequent standing breaks during her day to decrease her pain. Upon evaluation, she also described a room spinning dizziness which occurs when laying down and sitting up from her bed. She was tested with the [facility] positioning for BPPV and was positive on the left posterior canal. She was then treated with the Epley Maneuver x3 with reduction of intensity and duration of the dizziness with each consecutive time. These deficits have limited the patient's ability to complete ADLs, vocational responsibilities, and recreational activities. This patient would benefit from OP PT services to address their impairments and functional limitations to maximize functional mobility. The patient was educated on importance of improved postural awareness during her work day, use of a lumbar support roll to help with this, as well as ergonomic set up for her home desk environment.  She was also educated on anatomy of the vestibular system and pathophysiology of BPPV, the treatment associated with this and red flags to treatment. She was provided a HEP of postural exercises and demonstrated/verbalized understanding all education. Impairments: abnormal or restricted ROM, activity intolerance, impaired balance, impaired physical strength, lacks appropriate home exercise program, pain with function and poor posture     Symptom irritability: moderateUnderstanding of Dx/Px/POC: excellent   Prognosis: good    Goals  STG to be achieved in 4 weeks: The patient will report a decrease in thoracic/lumbar "at worst" subjective pain rating score to at least 5/10 to allow for improved tolerance for functional activities. The patient will increase left lateral flexion AROM to at least minimally restricted degrees to allow for improved functional mobility. The patient will report resolution of room spinning dizziness when laying down or sitting up from bed. LTG to be achieved by DC: The patient will be independent in comprehensive HEP. The patient will report no pain with usual activities. The patient will improve all lumbar and thoracic AROM to [facility] to allow for improved functional mobility. The patient will increase bilateral lower trapezius MMT score to 4+/5 to allow for improved postural stabilization. The patient will demonstrate improved postural awareness, with performance of appropriate stretches as needed, to allow for completion of her vocational responsibilities.         Plan  Patient would benefit from: skilled physical therapy  Planned modality interventions: thermotherapy: hydrocollator packs, TENS and cryotherapy  Planned therapy interventions: manual therapy, joint mobilization, neuromuscular re-education, patient education, flexibility, strengthening, stretching, therapeutic activities, therapeutic exercise, home exercise program, abdominal trunk stabilization, postural training, canalith repositioning and balance  Frequency: 2x week  Duration in weeks: 12  Plan of Care beginning date: 2023  Plan of Care expiration date: 2023  Treatment plan discussed with: patient        Subjective Evaluation    History of Present Illness  Mechanism of injury: Macarena Mtz presents to therapy with a chief complaint of intermittent thoracic/lower back pain ongoing for . 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 She states that she purchased a rising desk that allows her to stand while working. She states that the pain starts in the mid thoracic back and can radiate up into her neck/right shoulder as well as her into her lower back. She describes it as a "tingling, aching, burning" sensation that radiates up and down her back. She states that the pain begins after sitting and working for 1-2 hours and when she stands up the pain does decrease. She states that driving does not bother her because she puts on her heated seats which helps her to tolerate this better. She denies pain or numbness/tingling radiating into her legs or arms. She also describes a "room spinning" dizziness which has been ongoing for 2 weeks. She states that initially she had these symptoms when rotating her head or leaning forward to get something off the ground. She also states that she had nausea at this time and one episode of vomiting. She reports that the nausea has subsided and now she only has the "room spinning" dizziness symptoms when laying down or sitting up out of bed. She denies diplopia, dysarthria, dysphagia, ataxia, tinnitus, headaches, vomiting.    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 Negative for headaches, nausea/motion sickness, tinnitus, trouble swallowing, difficulty breathing, shortness of breath, respiratory pain and visual change    Postural Observations  Seated posture: fair  Correction of posture: makes symptoms better        Palpation   Left   No palpable tenderness to the erector spinae. Right   No palpable tenderness to the erector spinae. Tenderness of the cervical paraspinals. Tenderness     Lumbar Spine  No tenderness in the spinous process, left transverse process and right transverse process. Left Hip   No tenderness in the ASIS. Right Hip   No tenderness in the ASIS. Neurological Testing     Sensation   Cervical/Thoracic   Left   Intact: light touch    Comments   Right light touch: "tingling" to right thoracic paraspinal musculature.      Reflexes   Left   Biceps (C5/C6): normal (2+)  Brachioradialis (C6): normal (2+)  Triceps (C7): normal (2+)    Right   Biceps (C5/C6): normal (2+)  Brachioradialis (C6): normal (2+)  Triceps (C7): normal (2+)    Active Range of Motion   Cervical/Thoracic Spine       Thoracic    Flexion:  WFL and with pain  Extension:  with pain Restriction level: minimal  Left lateral flexion:  with pain Restriction level: moderate  Right lateral flexion:  with pain Restriction level: moderate  Left rotation:  Restriction level: minimal  Right rotation:  WFL and with pain    Lumbar   Flexion:  Restriction level: minimal  Extension:  with pain Restriction level: moderate  Left lateral flexion:  WFL and with pain  Right lateral flexion:  WFL  Left rotation:  with pain Restriction level: minimal  Right rotation:  WFL and with pain    Joint Play   Joints within functional limits: T10, T11, T12, L1, L2, L3, L4 and L5     Hypomobile: T3, T4, T5, T6, T7, T8 and T9     Pain: T3, T4, T5, T6, T7, T8 and T9     Strength/Myotome Testing   Cervical Spine     Left   Interossei strength (t1): 4+    Right   Interossei strength (t1): 4+    Left Shoulder     Planes of Motion   Flexion: 4+   Abduction: 4+   External rotation at 0°: 4+   Internal rotation at 0°: 4+     Isolated Muscles   Lower trapezius: 4-   Middle trapezius: 4-     Right Shoulder     Planes of Motion   Flexion: 4+   Abduction: 4+   External rotation at 0°: 4+   Internal rotation at 0°: 4+     Isolated Muscles   Lower trapezius: 4-   Middle trapezius: 4-     Left Elbow   Flexion: 4+  Extension: 4+    Right Elbow   Flexion: 4+  Extension: 4+    Left Wrist/Hand   Wrist extension: 4+  Wrist flexion: 4+    Right Wrist/Hand   Wrist extension: 4+  Wrist flexion: 4+    Left Hip   Planes of Motion   Flexion: 4+  Extension: 4-  Abduction: 4-    Right Hip   Planes of Motion   Flexion: 4+  Extension: 4-  Abduction: 4-    Left Knee   Flexion: 5  Extension: 5    Right Knee   Flexion: 5  Extension: 5    Left Ankle/Foot   Dorsiflexion: 5  Plantar flexion: 5    Right Ankle/Foot   Dorsiflexion: 5  Plantar flexion: 5  Neuro Exam:     Headaches   Patient reports headaches: No.     Positional testing   [place]-Hallpike   Left posterior canal: symptomatic, torsional and upbeating      Flowsheet Rows    Flowsheet Row Most Recent Value   PT/OT G-Codes    Current Score 47   Projected Score 0             Precautions: standard   Access Code: [email or web address]       Lumbar  Vertigo  POC expires Auth Status Unit limit Start date  Expiration date PT/OT + Visit Limit?  Copay/ Co- Insurance   11/14/23     Re-eval by 9/22 N/A N/A 6/1 5/31/24 120 per cly               Visit/Unit Tracking  AUTH Status:  Date 8/22              120 per cly Used 1               Remaining  119 Manuals 8/22            Central PA mobs to T3-T8             R UL PA mobs to T3-T8             L Epley  BE x3                         Neuro Re-Ed             Scapular retractions HEP            TB rows             TB pulldowns              Prone Y,T,Is B/L             B/L shoulder ER with TB             TB diagonal pull aparts             Prone alt LEs                          Pt education PT POC, HEP, postural awareness, lumbar roll, BPPV and vestibular anatomy            Ther Ex             UBE fwd and retro             R UT and levator stretch HEP            Seated thoracic ext in chair HEP            B/L open books             Foam roll protocol- hugs, punches, swimmers, snow angels                                                     Ther Activity                                       Gait Training                                       Modalities

## 2023-08-23 DIAGNOSIS — R42 VERTIGO: Primary | ICD-10-CM

## 2023-08-25 NOTE — PROGRESS NOTES
Daily Note     Today's date: 2023  Patient name: Malka Suresh  : 1975  MRN: [id number]  Referring provider: Brandon Mooney PA-C  Dx:   Encounter Diagnosis     ICD-10-CM    1. Chronic right-sided thoracic back pain  M54.6     G89.29       2. Chronic right-sided low back pain without sciatica  M54.50     G89.29       3. BPPV (benign paroxysmal positional vertigo), left  H81.12                      Subjective: Patient reports that since her evaluation, she has not had any dizziness symptoms when laying down in bed. She states that she was unable to complete her postural exercises secondary to being busy at work and planning a family party. Objective: See treatment diary below      Assessment: Tolerated treatment well. Patient tested negative for left posterior canal BPPV in Karlie Crutch positioning x3 therefore advised patient that her BPPV is cleared at this time. Educated that vertigo will be placed on hold for 30 days in case of return in symptoms, however will continue to focus on thoracic and lumbar pain. Advised on DOMS from postural exercises this session; verbalized understanding. Patient demonstrated fatigue post treatment, exhibited good technique with therapeutic exercises and would benefit from continued PT      Plan: Continue per plan of care. Precautions: standard   Access Code: [email or web address]       Lumbar  Vertigo  POC expires Auth Status Unit limit Start date  Expiration date PT/OT + Visit Limit?  Copay/ Co- Insurance   23     Re-eval by  N/A N/A 24 120 per cly               Visit/Unit Tracking  AUTH Status:  Date              120 per cly Used 1 2              Remaining  119 118                 Manuals            Central PA mobs to T3-T8             R UL PA mobs to T3-T8             L Epley  BE x3 BE x3                        Neuro Re-Ed             Scapular retractions HEP            TB rows  GTB 5" 2x10           TB pulldowns   GTB 5" 2x10 Prone Y,T,Is B/L  3"x10            B/L shoulder ER with TB             TB diagonal pull aparts             Prone alt LEs                          Pt education PT POC, HEP, postural awareness, lumbar roll, BPPV and vestibular anatomy DOMS, BPPV           Ther Ex             UBE fwd and retro for thoracic ROM and postural muscles  4'/4'           R UT and levator stretch HEP            Seated thoracic ext in chair HEP 10"x10 with 1/2 foam roll            B/L open books  10"x10            Foam roll protocol- hugs, punches, swimmers, snow angels                                                     Ther Activity                                       Gait Training                                       Modalities

## 2023-08-28 ENCOUNTER — OFFICE VISIT (OUTPATIENT)
Dept: PHYSICAL THERAPY | Facility: CLINIC | Age: 48
End: 2023-08-28
Payer: COMMERCIAL

## 2023-08-28 DIAGNOSIS — G89.29 CHRONIC RIGHT-SIDED THORACIC BACK PAIN: Primary | ICD-10-CM

## 2023-08-28 DIAGNOSIS — M54.6 CHRONIC RIGHT-SIDED THORACIC BACK PAIN: Primary | ICD-10-CM

## 2023-08-28 DIAGNOSIS — G89.29 CHRONIC RIGHT-SIDED LOW BACK PAIN WITHOUT SCIATICA: ICD-10-CM

## 2023-08-28 DIAGNOSIS — H81.12 BPPV (BENIGN PAROXYSMAL POSITIONAL VERTIGO), LEFT: ICD-10-CM

## 2023-08-28 DIAGNOSIS — M54.50 CHRONIC RIGHT-SIDED LOW BACK PAIN WITHOUT SCIATICA: ICD-10-CM

## 2023-08-28 PROCEDURE — 97112 NEUROMUSCULAR REEDUCATION: CPT

## 2023-08-28 PROCEDURE — 97140 MANUAL THERAPY 1/> REGIONS: CPT

## 2023-08-28 PROCEDURE — 97110 THERAPEUTIC EXERCISES: CPT

## 2023-08-29 ENCOUNTER — OFFICE VISIT (OUTPATIENT)
Dept: PHYSICAL THERAPY | Facility: CLINIC | Age: 48
End: 2023-08-29
Payer: COMMERCIAL

## 2023-08-29 DIAGNOSIS — M54.6 CHRONIC RIGHT-SIDED THORACIC BACK PAIN: Primary | ICD-10-CM

## 2023-08-29 DIAGNOSIS — G89.29 CHRONIC RIGHT-SIDED THORACIC BACK PAIN: Primary | ICD-10-CM

## 2023-08-29 DIAGNOSIS — G89.29 CHRONIC RIGHT-SIDED LOW BACK PAIN WITHOUT SCIATICA: ICD-10-CM

## 2023-08-29 DIAGNOSIS — M54.50 CHRONIC RIGHT-SIDED LOW BACK PAIN WITHOUT SCIATICA: ICD-10-CM

## 2023-08-29 PROCEDURE — 97112 NEUROMUSCULAR REEDUCATION: CPT

## 2023-08-29 NOTE — PROGRESS NOTES
Daily Note     Today's date: 2023  Patient name: Girma Harding  : 1975  MRN: [id number]  Referring provider: Jb Frazier PA-C  Dx:   Encounter Diagnosis     ICD-10-CM    1. Chronic right-sided thoracic back pain  M54.6     G89.29       2. Chronic right-sided low back pain without sciatica  M54.50     G89.29                      Subjective: Patient reports that she feels okay after her session yesterday and denies soreness. Declines any dizziness. Objective: See treatment diary below      Assessment: Tolerated treatment well. Patient noted relief in thoracic spine tension and "tingling" following thoracic open books and foam roll stretches. Reviewed cervical stretches for HEP and educated patient on regular stretching and postural mindfulness throughout her day. Patient demonstrated fatigue post treatment, exhibited good technique with therapeutic exercises and would benefit from continued PT. Exceeded predicted FOTO score for BPPV and therefore will place BPPV on hold for 30 days and will DC pending no return of symptoms. Plan: Continue per plan of care. Precautions: standard   Access Code: [email or web address]       Lumbar  Vertigo  POC expires Auth Status Unit limit Start date  Expiration date PT/OT + Visit Limit?  Copay/ Co- Insurance   23     Re-eval by  N/A N/A 24 120 per cly               Visit/Unit Tracking  AUTH Status:  Date             120 per cly Used 1 2 3             Remaining  119 118 117                Manuals           Central PA mobs to T3-T8             R UL PA mobs to T3-T8             L Epley  BE x3 BE x3                        Neuro Re-Ed             Scapular retractions HEP            TB rows  GTB 5" 2x10 GTB 5" 2x10          TB pulldowns   GTB 5" 2x10  GTB 5" 2x10          Prone Y,T,Is B/L  3"x10            B/L shoulder ER with TB   GTB 5" 2x10          TB diagonal pull aparts             Prone alt LEs Pt education PT POC, HEP, postural awareness, lumbar roll, BPPV and vestibular anatomy DOMS, BPPV DOMS          Ther Ex             UBE fwd and retro for thoracic ROM and postural muscles  4'/4' 4'/4'          R UT and levator stretch HEP  reviewed          Seated thoracic ext in chair HEP 10"x10 with 1/2 foam roll  10"x10 with 1/2 foam roll          B/L open books  10"x10  10"x10          Foam roll protocol- hugs, punches, swimmers, snow angels    1 min ea                                                 Ther Activity                                       Gait Training                                       Modalities

## 2023-09-07 ENCOUNTER — OFFICE VISIT (OUTPATIENT)
Dept: PHYSICAL THERAPY | Facility: CLINIC | Age: 48
End: 2023-09-07
Payer: COMMERCIAL

## 2023-09-07 DIAGNOSIS — M54.50 CHRONIC RIGHT-SIDED LOW BACK PAIN WITHOUT SCIATICA: ICD-10-CM

## 2023-09-07 DIAGNOSIS — G89.29 CHRONIC RIGHT-SIDED LOW BACK PAIN WITHOUT SCIATICA: ICD-10-CM

## 2023-09-07 DIAGNOSIS — G89.29 CHRONIC RIGHT-SIDED THORACIC BACK PAIN: Primary | ICD-10-CM

## 2023-09-07 DIAGNOSIS — M54.6 CHRONIC RIGHT-SIDED THORACIC BACK PAIN: Primary | ICD-10-CM

## 2023-09-07 PROCEDURE — 97112 NEUROMUSCULAR REEDUCATION: CPT

## 2023-09-07 PROCEDURE — 97110 THERAPEUTIC EXERCISES: CPT

## 2023-09-07 NOTE — PROGRESS NOTES
Daily Note     Today's date: 2023  Patient name: Elizabeth Tsai  : 1975  MRN: [id number]  Referring provider: Zaida Figueredo PA-C  Dx:   Encounter Diagnosis     ICD-10-CM    1. Chronic right-sided thoracic back pain  M54.6     G89.29       2. Chronic right-sided low back pain without sciatica  M54.50     G89.29           Start Time:   Stop Time:   Total time in clinic (min): 40 minutes    Subjective: Pt noted that she does have some radicular symptoms in her R side of  lower back into her R hip. Objective: See treatment diary below      Assessment: Continued with treatment session with focus on middle to lower back strengthening with proper postural control. Tolerated treatment well. Patient demonstrated fatigue post treatment, exhibited good technique with therapeutic exercises and would benefit from continued PT    Education reviewed with patient with verbal agreement and understanding.   - HEP   - DOMS - 24 to 48 hours of muscle sorness. - Modalities as needed CP/HP     Plan: Continue per plan of care. Precautions: standard   Access Code: [email or web address]       Lumbar  Vertigo  POC expires Auth Status Unit limit Start date  Expiration date PT/OT + Visit Limit? Copay/ Co- Insurance   23     Re-eval by  N/A N/A 24 120 per cly               Visit/Unit Tracking  AUTH Status:  Date            120 per cly Used 1 2 3 4            Remaining  119 118 117 116               Manuals          Central PA mobs to T3-T8             R UL PA mobs to T3-T8             L Epley  BE x3 BE x3                        Neuro Re-Ed             Scapular retractions HEP            TB rows  GTB 5" 2x10 GTB 5" 2x10 GTB 5" 2x10          TB pulldowns   GTB 5" 2x10  GTB 5" 2x10 GTB 5" 2x 10          Prone Y,T,Is B/L  3"x10   10x ea.           B/L shoulder ER with TB   GTB 5" 2x10          TB diagonal pull aparts             Prone alt LEs                          Pt education PT POC, HEP, postural awareness, lumbar roll, BPPV and vestibular anatomy DOMS, BPPV DOMS          Ther Ex             UBE fwd and retro for thoracic ROM and postural muscles  4'/4' 4'/4' 4'/4'         R UT and levator stretch HEP  reviewed          Seated thoracic ext in chair HEP 10"x10 with 1/2 foam roll  10"x10 with 1/2 foam roll          B/L open books  10"x10  10"x10          Foam roll protocol- hugs, punches, swimmers, snow angels    1 min ea 1 min ea.                                                  Ther Activity                                       Gait Training                                       Modalities

## 2023-09-11 ENCOUNTER — OFFICE VISIT (OUTPATIENT)
Dept: PHYSICAL THERAPY | Facility: CLINIC | Age: 48
End: 2023-09-11
Payer: COMMERCIAL

## 2023-09-11 DIAGNOSIS — M54.6 CHRONIC RIGHT-SIDED THORACIC BACK PAIN: Primary | ICD-10-CM

## 2023-09-11 DIAGNOSIS — G89.29 CHRONIC RIGHT-SIDED LOW BACK PAIN WITHOUT SCIATICA: ICD-10-CM

## 2023-09-11 DIAGNOSIS — H81.12 BPPV (BENIGN PAROXYSMAL POSITIONAL VERTIGO), LEFT: ICD-10-CM

## 2023-09-11 DIAGNOSIS — M54.50 CHRONIC RIGHT-SIDED LOW BACK PAIN WITHOUT SCIATICA: ICD-10-CM

## 2023-09-11 DIAGNOSIS — G89.29 CHRONIC RIGHT-SIDED THORACIC BACK PAIN: Primary | ICD-10-CM

## 2023-09-11 PROCEDURE — 97112 NEUROMUSCULAR REEDUCATION: CPT

## 2023-09-11 PROCEDURE — 97140 MANUAL THERAPY 1/> REGIONS: CPT

## 2023-09-11 PROCEDURE — 97110 THERAPEUTIC EXERCISES: CPT

## 2023-09-11 NOTE — PROGRESS NOTES
Daily Note     Today's date: 2023  Patient name: Walker Castro  : 1975  MRN: [id number]  Referring provider: Chelsea Lockett PA-C  Dx:   Encounter Diagnosis     ICD-10-CM    1. Chronic right-sided thoracic back pain  M54.6     G89.29       2. Chronic right-sided low back pain without sciatica  M54.50     G89.29       3. BPPV (benign paroxysmal positional vertigo), left  H81.12           Start Time:   Stop Time:   Total time in clinic (min): 39 minutes    Subjective: Pt noted that her vertigo is back and noted that the shoulder/thoracic  is the same  With no changes. Pt noted that she groomed the dogs on Friday and started getting vertigo started on Saturday. Pt noted she feels like this may be the trigger due to allergies. Objective: See treatment diary below      Assessment:  Continued treatment this visit. + test with R kwesi hallpike this visit and treated with Eply. Overall patient noted having no symptoms of dizziness s/p maneuvers completed. Tolerated treatment well. Due to patient request hold on all prone lying and supine exercises this visit. Patient exhibited good technique with therapeutic exercises and would benefit from continued PT. S/P treatment session noted no changes with R sided LB. Education reviewed with patient with verbal agreement and understanding.   - HEP   - DOMS - 24 to 48 hours of muscle sorness.   - Advised patient to not avoid any specific position due to her vertigo.       Plan: Continue per plan of care. Precautions: standard   Access Code: [email or web address]       Lumbar  Vertigo  POC expires Auth Status Unit limit Start date  Expiration date PT/OT + Visit Limit?  Copay/ Co- Insurance   23     Re-eval by  N/A N/A 24 120 per cly               Visit/Unit Tracking  AUTH Status:  Date           120 per cly Used 1 2 3 4 5            Remaining  119 118 117 116 115              Manuals         [place] PA mobs to T3-T8             R UL PA mobs to T3-T8             L Epley  BE x3 BE x3   x1 kwesi cassidy beverly  Slight dizzy noted after thinking about it         R kwesi cassidy beverly      x1 + dizzy slight nystagmus         R epley      x 3        Neuro Re-Ed             Scapular retractions HEP            TB rows  GTB 5" 2x10 GTB 5" 2x10 GTB 5" 2x10  GTB 2x 10         TB pulldowns   GTB 5" 2x10  GTB 5" 2x10 GTB 5" 2x 10  GTB 2x 10         Prone Y,T,Is B/L  3"x10   10x ea. Hold         B/L shoulder ER with TB   GTB 5" 2x10          TB diagonal pull aparts             Prone alt LEs                          Pt education PT POC, HEP, postural awareness, lumbar roll, BPPV and vestibular anatomy DOMS, BPPV DOMS          Ther Ex             UBE fwd and retro for thoracic ROM and postural muscles  4'/4' 4'/4' 4'/4' 4'/4'        R UT and levator stretch HEP  reviewed          Seated thoracic ext in chair HEP 10"x10 with 1/2 foam roll  10"x10 with 1/2 foam roll          B/L open books  10"x10  10"x10          Foam roll protocol- hugs, punches, swimmers, snow angels    1 min ea 1 min ea.   Hold                                                Ther Activity                                       Gait Training                                       Modalities

## 2023-09-14 ENCOUNTER — OFFICE VISIT (OUTPATIENT)
Dept: PHYSICAL THERAPY | Facility: CLINIC | Age: 48
End: 2023-09-14
Payer: COMMERCIAL

## 2023-09-14 DIAGNOSIS — G89.29 CHRONIC RIGHT-SIDED THORACIC BACK PAIN: Primary | ICD-10-CM

## 2023-09-14 DIAGNOSIS — M54.50 CHRONIC RIGHT-SIDED LOW BACK PAIN WITHOUT SCIATICA: ICD-10-CM

## 2023-09-14 DIAGNOSIS — M54.6 CHRONIC RIGHT-SIDED THORACIC BACK PAIN: Primary | ICD-10-CM

## 2023-09-14 DIAGNOSIS — G89.29 CHRONIC RIGHT-SIDED LOW BACK PAIN WITHOUT SCIATICA: ICD-10-CM

## 2023-09-14 PROCEDURE — 97112 NEUROMUSCULAR REEDUCATION: CPT

## 2023-09-14 PROCEDURE — 97110 THERAPEUTIC EXERCISES: CPT

## 2023-09-14 NOTE — PROGRESS NOTES
Daily Note     Today's date: 2023  Patient name: Shannon Barbosa  : 1975  MRN: [id number]  Referring provider: Renetta Duomnt PA-C  Dx:   Encounter Diagnosis     ICD-10-CM    1. Chronic right-sided thoracic back pain  M54.6     G89.29       2. Chronic right-sided low back pain without sciatica  M54.50     G89.29           Start Time: 1745  Stop Time: 1823  Total time in clinic (min): 38 minutes    Subjective: pt noted that her vertigo has cleared up. But her R side of her LB as well as thoracic spine is all the same no changes. Objective: See treatment diary below      Assessment: Continued with treatment session, Tolerated treatment well. Progressions noted with no changes in LBP. Pt challenged today and noted some soreness s/p open books. Patient exhibited good technique with therapeutic exercises and would benefit from continued PT. Education reviewed with patient with verbal agreement and understanding.   - HEP compliance. - DOMS - 24 to 48 hours of muscle sorness.        Plan: Continue per plan of care. Precautions: standard   Access Code: [email or web address]       Lumbar  Vertigo  POC expires Auth Status Unit limit Start date  Expiration date PT/OT + Visit Limit?  Copay/ Co- Insurance   23     Re-eval by  N/A N/A 24 120 per cly               Visit/Unit Tracking  AUTH Status:  Date          120 per cly Used 1 2 3 4 5  6          Remaining  119 118 117 116 115 114             Manuals        Central PA mobs to T3-T8             R UL PA mobs to T3-T8             L Epley  BE x3 BE x3   x1 kwesi benjamin pike  Slight dizzy noted after thinking about it         R kwesi benjamin pike      x1 + dizzy slight nystagmus         R epley      x 3        Neuro Re-Ed             Scapular retractions HEP            TB rows  GTB 5" 2x10 GTB 5" 2x10 GTB 5" 2x10  GTB 2x 10  GTB 2x 10        TB pulldowns   GTB 5" 2x10  GTB 5" 2x10 GTB 5" 2x 10  GTB 2x 10  GTB 2x 10        Prone Y,T,Is B/L  3"x10   10x ea. Hold         B/L shoulder ER with TB   GTB 5" 2x10   GTB 5" 2x 10        TB diagonal pull aparts      GTB 2x 10        Prone alt LEs                          Pt education PT POC, HEP, postural awareness, lumbar roll, BPPV and vestibular anatomy DOMS, BPPV DOMS          Ther Ex             UBE fwd and retro for thoracic ROM and postural muscles  4'/4' 4'/4' 4'/4' 4'/4' 4'/4'       R UT and levator stretch HEP  reviewed          Seated thoracic ext in chair HEP 10"x10 with 1/2 foam roll  10"x10 with 1/2 foam roll          B/L open books  10"x10  10"x10   10" x10        Foam roll protocol- hugs, punches, swimmers, snow angels    1 min ea 1 min ea. Hold  1 min ea.                                                Ther Activity                                       Gait Training                                       Modalities

## 2023-09-18 ENCOUNTER — OFFICE VISIT (OUTPATIENT)
Dept: PHYSICAL THERAPY | Facility: CLINIC | Age: 48
End: 2023-09-18
Payer: COMMERCIAL

## 2023-09-18 DIAGNOSIS — M54.50 CHRONIC RIGHT-SIDED LOW BACK PAIN WITHOUT SCIATICA: ICD-10-CM

## 2023-09-18 DIAGNOSIS — G89.29 CHRONIC RIGHT-SIDED LOW BACK PAIN WITHOUT SCIATICA: ICD-10-CM

## 2023-09-18 DIAGNOSIS — M54.6 CHRONIC RIGHT-SIDED THORACIC BACK PAIN: Primary | ICD-10-CM

## 2023-09-18 DIAGNOSIS — G89.29 CHRONIC RIGHT-SIDED THORACIC BACK PAIN: Primary | ICD-10-CM

## 2023-09-18 PROCEDURE — 97112 NEUROMUSCULAR REEDUCATION: CPT

## 2023-09-18 PROCEDURE — 97110 THERAPEUTIC EXERCISES: CPT

## 2023-09-18 NOTE — PROGRESS NOTES
Daily Note     Today's date: 2023  Patient name: Reji Vigil  : 1975  MRN: [id number]  Referring provider: Ping Chatterjee PA-C  Dx:   Encounter Diagnosis     ICD-10-CM    1. Chronic right-sided thoracic back pain  M54.6     G89.29       2. Chronic right-sided low back pain without sciatica  M54.50     G89.29                      Subjective: Patient reports that she had another bout of vertigo over the weekend and she was able to complete the Epley maneuver independently with the help of her . She states that she wasn't dizzy yesterday or today. She feels that her back pain hasn't really improved, but she hasn't changed her posture. Objective: See treatment diary below      Assessment: Tolerated treatment well. Added TB snow angels to progress scapular and postural stabilization exercises. Advised patient on importance of changing her posture to see long lasting effects on her pain as well as reviewed lumbar support roll discussed at evaluation to serve as reminder for improved posture. Discussed referral to ENT and provided numbers to offices to have patient evaluated for recurrent vertigo symptoms. Patient demonstrated fatigue post treatment, exhibited good technique with therapeutic exercises and would benefit from continued PT. Plan: Continue per plan of care. Precautions: standard   Access Code: [email or web address]       Lumbar  Vertigo  POC expires Auth Status Unit limit Start date  Expiration date PT/OT + Visit Limit?  Copay/ Co- Insurance   23     Re-eval by  N/A N/A 24 120 per cly               Visit/Unit Tracking  AUTH Status:  Date         120 per cly Used 1 2 3 4 5  6 7         Remaining  119 118 117 116 115 114 113            Manuals       Central PA mobs to T3-T8             R UL PA mobs to T3-T8             L Epley  BE x3 BE x3   x1 kwesi benjamin pike  Slight dizzy noted after thinking about it R kwesi benjamin pike      x1 + dizzy slight nystagmus         R epley      x 3        Neuro Re-Ed             Scapular retractions HEP            TB rows  GTB 5" 2x10 GTB 5" 2x10 GTB 5" 2x10  GTB 2x 10  GTB 2x 10  GTB 3x10      TB pulldowns   GTB 5" 2x10  GTB 5" 2x10 GTB 5" 2x 10  GTB 2x 10  GTB 2x 10  GTB 3x10      Prone Y,T,Is B/L  3"x10   10x ea. Hold         B/L shoulder ER with TB   GTB 5" 2x10   GTB 5" 2x 10  GTB 5" 2x 10       TB diagonal pull aparts      GTB 2x 10        Prone alt LEs             TB snow angels        RTB x10  VCs                   Pt education PT POC, HEP, postural awareness, lumbar roll, BPPV and vestibular anatomy DOMS, BPPV DOMS    Posture, lumbar roll, ENT      Ther Ex             UBE fwd and retro for thoracic ROM and postural muscles  4'/4' 4'/4' 4'/4' 4'/4' 4'/4' 4'/4'      R UT and levator stretch HEP  reviewed          Seated thoracic ext in chair HEP 10"x10 with 1/2 foam roll  10"x10 with 1/2 foam roll          B/L open books  10"x10  10"x10   10" x10        Foam roll protocol- hugs, punches, swimmers, snow angels    1 min ea 1 min ea. Hold  1 min ea.   1 min ea                                             Ther Activity                                       Gait Training                                       Modalities

## 2023-09-21 ENCOUNTER — EVALUATION (OUTPATIENT)
Dept: PHYSICAL THERAPY | Facility: CLINIC | Age: 48
End: 2023-09-21
Payer: COMMERCIAL

## 2023-09-21 DIAGNOSIS — M54.6 CHRONIC RIGHT-SIDED THORACIC BACK PAIN: Primary | ICD-10-CM

## 2023-09-21 DIAGNOSIS — M54.50 CHRONIC RIGHT-SIDED LOW BACK PAIN WITHOUT SCIATICA: ICD-10-CM

## 2023-09-21 DIAGNOSIS — G89.29 CHRONIC RIGHT-SIDED THORACIC BACK PAIN: Primary | ICD-10-CM

## 2023-09-21 DIAGNOSIS — G89.29 CHRONIC RIGHT-SIDED LOW BACK PAIN WITHOUT SCIATICA: ICD-10-CM

## 2023-09-21 PROCEDURE — 97110 THERAPEUTIC EXERCISES: CPT

## 2023-09-21 PROCEDURE — 97140 MANUAL THERAPY 1/> REGIONS: CPT

## 2023-09-21 PROCEDURE — 97112 NEUROMUSCULAR REEDUCATION: CPT

## 2023-09-21 NOTE — PROGRESS NOTES
PT Re-Evaluation     Today's date: 2023  Patient name: Juli Leiva  : 1975  MRN: [id number]  Referring provider: Alejandro Bella PA-C  Dx:   Encounter Diagnosis     ICD-10-CM    1. Chronic right-sided thoracic back pain  M54.6     G89.29       2. Chronic right-sided low back pain without sciatica  M54.50     G89.29                      Assessment  Assessment details: Juli Leiva is a 57 y.o. female presenting to physical therapy for a reevaluation with a MD referral of chronic right-sided thoracic back pain, chronic right-sided low back pain without sciatica. Since her initial evaluation, she reports minimal to no improvement in her back. Despite her subjective reports, objectively she has demonstrated improved thoracic and lumbar ROM. She continues to report pain with ROM as well as pain with PA mobilizations to lumbar spine. She did demonstrate significant centralization of symptoms with a reduction in pain from an 8/10 to a 4/10 with sustained lying lumbar extension (MARCO ANTONIO) and repeated lying lumbar extension (PPU). She has started to incorporate postural modifications while working, although has not seen any immediate changes to her pain thus far. Due to her deficits, she continues to have limitations in her ability to complete ADLs, vocational responsibilities, and recreational activities. This patient would benefit from continued PT services to address their impairments and functional limitations to maximize functional outcome. She was educated on continued postural awareness and performance of repeated extension based exercises as long as symptoms continue to centralize in order to see relief in her symptoms. She was also educated on contacting PCP for follow up with possible referral for additional imaging or to spine and pain management due to chronicity of symptoms.    Impairments: abnormal or restricted ROM, activity intolerance, impaired balance, impaired physical strength, lacks appropriate home exercise program, pain with function and poor posture     Symptom irritability: moderateUnderstanding of Dx/Px/POC: excellent   Prognosis: good    Goals  STG to be achieved in 4 weeks: The patient will report a decrease in thoracic/lumbar "at worst" subjective pain rating score to at least 5/10 to allow for improved tolerance for functional activities. NOT MET  The patient will increase left lateral flexion AROM to at least minimally restricted degrees to allow for improved functional mobility. MET  The patient will report resolution of room spinning dizziness when laying down or sitting up from bed. MET    LTG to be achieved by DC: ALL NOT MET PROGRESSING  The patient will be independent in comprehensive [facility]. The patient will report no pain with usual activities. The patient will improve all lumbar and thoracic AROM to [facility] to allow for improved functional mobility. The patient will increase bilateral lower trapezius MMT score to 4+/5 to allow for improved postural stabilization. The patient will demonstrate improved postural awareness, with performance of appropriate stretches as needed, to allow for completion of her vocational responsibilities.         Plan  Patient would benefit from: skilled physical therapy  Planned modality interventions: thermotherapy: hydrocollator packs, TENS and cryotherapy  Planned therapy interventions: manual therapy, joint mobilization, neuromuscular re-education, patient education, flexibility, strengthening, stretching, therapeutic activities, therapeutic exercise, home exercise program, abdominal trunk stabilization, postural training, canalith repositioning and balance  Frequency: 2x week  Duration in weeks: 12  Plan of Care beginning date: 8/22/2023  Plan of Care expiration date: 11/14/2023  Treatment plan discussed with: patient        Subjective Evaluation    History of Present Illness  Mechanism of injury: Toyin Hastings reports minimal to no improvement since beginning PT services. She notes that her back continues to be very painful despite exercises, especially the past two days. She states that she does get some relief with her stretches, however they are temporary. She did order a half foam roll so she can complete the foam roll stretches at home since these are helpful. She states that recently she has started to be more mindful of her posture when sitting, but she hasn't seen any immediate relief from this yet. She reports that she has tried standing up more during her work day which had been helping, though these past two days her pain has been worse and it hasn't changed it. She is out of her muscle relaxer and naproxen, so she is only taking tylenol for pain right now. She states that she did have some room spinning dizziness over the weekend and was able to self complete the Epley which resolved the symptoms. She did make an appt with an ENT on 10/9/23. 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  Negative for headaches, nausea/motion sickness, tinnitus, trouble swallowing, difficulty breathing, shortness of breath, respiratory pain and visual change    Postural Observations  Seated posture: fair  Correction of posture: makes symptoms better        Palpation   Left   No palpable tenderness to the erector spinae. Right   No palpable tenderness to the erector spinae. Tenderness of the cervical paraspinals. Tenderness     Lumbar Spine  No tenderness in the spinous process, left transverse process and right transverse process. Left Hip   No tenderness in the ASIS. Right Hip   No tenderness in the ASIS. Neurological Testing     Sensation   Cervical/Thoracic   Left   Intact: light touch    Comments   Right light touch: "tingling" to right thoracic paraspinal musculature.      Reflexes   Left   Biceps (C5/C6): normal (2+)  Brachioradialis (C6): normal (2+)  Triceps (C7): normal (2+)    Right   Biceps (C5/C6): normal (2+)  Brachioradialis (C6): normal (2+)  Triceps (C7): normal (2+)    Active Range of Motion   Cervical/Thoracic Spine       Thoracic    Flexion:  WFL and with pain  Extension:  with pain Restriction level: minimal  Left lateral flexion:  WFL  Right lateral flexion:  WFL  Left rotation:  Restriction level: minimal  Right rotation:  WFL    Lumbar   Flexion:  Restriction level: minimal  Extension:  with pain Restriction level: minimal  Left lateral flexion:  WFL  Right lateral flexion:  WFL  Left rotation:  with pain Restriction level: minimal  Right rotation:  [facility]    Joint Play   Joints within functional limits: T8, T9, T10, T11, T12, L1, L2, L3, L4 and L5     Hypomobile: T3, T4, T5, T6 and T7     Pain: T3, T4, T5, T6, T7, L2, L3, L4 and L5   Mechanical Assessment    Cervical      Thoracic    Lying extension: sustained positions  Pain intensity: better  Pain level: decreased    Lumbar      Strength/Myotome Testing   Cervical Spine     Left   Interossei strength (t1): 4+    Right   Interossei strength (t1): 4+    Left Shoulder     Planes of Motion   Flexion: 4+   Abduction: 4+   External rotation at 0°: 4+   Internal rotation at 0°: 4+     Isolated Muscles   Lower trapezius: 4-   Middle trapezius: 4-     Right Shoulder     Planes of Motion   Flexion: 4+   Abduction: 4+   External rotation at 0°: 4+   Internal rotation at 0°: 4+     Isolated Muscles   Lower trapezius: 4-   Middle trapezius: 4-     Left Elbow   Flexion: 4+  Extension: 4+    Right Elbow   Flexion: 4+  Extension: 4+    Left Wrist/Hand   Wrist extension: 4+  Wrist flexion: 4+    Right Wrist/Hand   Wrist extension: 4+  Wrist flexion: 4+    Left Hip   Planes of Motion   Flexion: 4+  Extension: 4-  Abduction: 4-    Right Hip   Planes of Motion   Flexion: 4+  Extension: 4-  Abduction: 4-    Left Knee   Flexion: 5  Extension: 5    Right Knee   Flexion: 5  Extension: 5    Left Ankle/Foot   Dorsiflexion: 5  Plantar flexion: 5    Right Ankle/Foot   Dorsiflexion: 5  Plantar flexion: 5  Neuro Exam:     Headaches   Patient reports headaches: No.     Positional testing   [place]   Left posterior canal: WNL             Precautions: standard   Access Code: [email or web address]       Lumbar  Vertigo  POC expires Auth Status Unit limit Start date  Expiration date PT/OT + Visit Limit? Copay/ Co- Insurance   11/14/23     Re-eval by 9/22 N/A N/A 6/1 5/31/24 120 per cly               Visit/Unit Tracking  AUTH Status:  Date 8/22 8/28 8/29 9/7 9/11 9/14 9/18 9/21       120 per cly Used 1 2 3 4 5  6 7 8        Remaining  119 118 117 116 115 114 113 112           Manuals 8/22 8/28 8/29 9/7 9/11 9/14 9/18 9/21     Central PA mobs to T3-T8             R UL PA mobs to T3-T8             L Epley  BE x3 BE x3   x1 kwesi benjamin pike  Slight dizzy noted after thinking about it         R kwesi benjamin pike      x1 + dizzy slight nystagmus         R epley      x 3        Reassesment        BE     Neuro Re-Ed             Scapular retractions HEP            TB rows  GTB 5" 2x10 GTB 5" 2x10 GTB 5" 2x10  GTB 2x 10  GTB 2x 10  GTB 3x10      TB pulldowns   GTB 5" 2x10  GTB 5" 2x10 GTB 5" 2x 10  GTB 2x 10  GTB 2x 10  GTB 3x10      Prone Y,T,Is B/L  3"x10   10x ea.   Hold         B/L shoulder ER with TB   GTB 5" 2x10   GTB 5" 2x 10  GTB 5" 2x 10       TB diagonal pull aparts      GTB 2x 10 Prone alt LEs             TB snow angels        RTB x10  VCs                   Pt education PT POC, HEP, postural awareness, lumbar roll, BPPV and vestibular anatomy DOMS, BPPV DOMS    Posture, lumbar roll, ENT Centralization of symptoms, repeated movements, posture, HEP      Ther Ex             UBE fwd and retro for thoracic ROM and postural muscles  4'/4' 4'/4' 4'/4' 4'/4' 4'/4' 4'/4'      R UT and levator stretch HEP  reviewed          Seated thoracic ext in chair HEP 10"x10 with 1/2 foam roll  10"x10 with 1/2 foam roll          B/L open books  10"x10  10"x10   10" x10        Foam roll protocol- hugs, punches, swimmers, snow angels    1 min ea 1 min ea. Hold  1 min ea.   1 min ea      PPU         3x10     MARCO ANTONIO        x3 min                               Ther Activity                                       Gait Training                                       Modalities

## 2023-09-25 ENCOUNTER — APPOINTMENT (OUTPATIENT)
Dept: PHYSICAL THERAPY | Facility: CLINIC | Age: 48
End: 2023-09-25
Payer: COMMERCIAL

## 2023-09-28 ENCOUNTER — OFFICE VISIT (OUTPATIENT)
Dept: PHYSICAL THERAPY | Facility: CLINIC | Age: 48
End: 2023-09-28
Payer: COMMERCIAL

## 2023-09-28 DIAGNOSIS — M54.6 CHRONIC RIGHT-SIDED THORACIC BACK PAIN: Primary | ICD-10-CM

## 2023-09-28 DIAGNOSIS — M54.50 CHRONIC RIGHT-SIDED LOW BACK PAIN WITHOUT SCIATICA: ICD-10-CM

## 2023-09-28 DIAGNOSIS — G89.29 CHRONIC RIGHT-SIDED THORACIC BACK PAIN: Primary | ICD-10-CM

## 2023-09-28 DIAGNOSIS — G89.29 CHRONIC RIGHT-SIDED LOW BACK PAIN WITHOUT SCIATICA: ICD-10-CM

## 2023-09-28 PROCEDURE — 97112 NEUROMUSCULAR REEDUCATION: CPT

## 2023-09-28 PROCEDURE — 97110 THERAPEUTIC EXERCISES: CPT

## 2023-09-28 NOTE — PROGRESS NOTES
Daily Note     Today's date: 2023  Patient name: Naz Schilling  : 1975  MRN: [id number]  Referring provider: Jim Granados PA-C  Dx:   Encounter Diagnosis     ICD-10-CM    1. Chronic right-sided thoracic back pain  M54.6     G89.29       2. Chronic right-sided low back pain without sciatica  M54.50     G89.29                      Subjective: Patient reports that the PPU and MARCO ANTONIO exercises do help her symptoms to decrease, however the pain does return later. She has been able to complete the stretches 4x per day. Objective: See treatment diary below      Assessment: Tolerated treatment well. Continues to respond favorably to PPU and foam roll exercise with reduced lumbar and thoracic pain following. She required increased cues for TA contractions during lumbar stabilization exercises. Able to complete all exercises without pain. Patient demonstrated fatigue post treatment, exhibited good technique with therapeutic exercises and would benefit from continued PT      Plan: Continue per plan of care. Precautions: standard   Access Code: [email or web address]       Lumbar  Vertigo  POC expires Auth Status Unit limit Start date  Expiration date PT/OT + Visit Limit?  Copay/ Co- Insurance   23     Re-eval by  N/A N/A 24 120 per cly               Visit/Unit Tracking  AUTH Status:  Date       120 per cly Used 1 2 3 4 5  6 7 8 9       Remaining  119 118 117 116 115 114 113 112 111          Manuals     Central PA mobs to T3-T8             R UL PA mobs to T3-T8             L Epley  BE x3 BE x3   x1 kwesi benjamin pike  Slight dizzy noted after thinking about it         R kwesi benjamin pike      x1 + dizzy slight nystagmus         R epley      x 3        Reassesment        BE     Neuro Re-Ed             Scapular retractions HEP            TB rows  GTB 5" 2x10 GTB 5" 2x10 GTB 5" 2x10  GTB 2x 10  GTB 2x 10  GTB 3x10      TB pulldowns   GTB 5" 2x10  GTB 5" 2x10 GTB 5" 2x 10  GTB 2x 10  GTB 2x 10  GTB 3x10      Prone Y,T,Is B/L  3"x10   10x ea. Hold         B/L shoulder ER with TB   GTB 5" 2x10   GTB 5" 2x 10  GTB 5" 2x 10       TB diagonal pull aparts      GTB 2x 10        Prone alt LEs         5" x10 ea    TB snow angels        RTB x10  VCs      TA bridges         5" 2x10    Supine alt UE/LE         5" x10    TB trunk anti-rotation             Pt education PT POC, HEP, postural awareness, lumbar roll, BPPV and vestibular anatomy DOMS, BPPV DOMS    Posture, lumbar roll, ENT Centralization of symptoms, repeated movements, posture, HEP      Ther Ex             UBE fwd and retro for thoracic ROM and postural muscles  4'/4' 4'/4' 4'/4' 4'/4' 4'/4' 4'/4'  4'/4'    R UT and levator stretch HEP  reviewed          Seated thoracic ext in chair HEP 10"x10 with 1/2 foam roll  10"x10 with 1/2 foam roll          B/L open books  10"x10  10"x10   10" x10        Foam roll protocol- hugs, punches, swimmers, snow angels    1 min ea 1 min ea. Hold  1 min ea.   1 min ea  1 min ea     PPU         3x10 2x10     MARCO ANTONIO        x3 min                               Ther Activity                                       Gait Training                                       Modalities

## 2023-10-09 ENCOUNTER — OFFICE VISIT (OUTPATIENT)
Dept: FAMILY MEDICINE CLINIC | Facility: CLINIC | Age: 48
End: 2023-10-09
Payer: COMMERCIAL

## 2023-10-09 ENCOUNTER — APPOINTMENT (OUTPATIENT)
Dept: RADIOLOGY | Facility: CLINIC | Age: 48
End: 2023-10-09
Payer: COMMERCIAL

## 2023-10-09 VITALS
WEIGHT: 194 LBS | TEMPERATURE: 98 F | OXYGEN SATURATION: 99 % | SYSTOLIC BLOOD PRESSURE: 110 MMHG | DIASTOLIC BLOOD PRESSURE: 70 MMHG | BODY MASS INDEX: 35.7 KG/M2 | HEIGHT: 62 IN | HEART RATE: 85 BPM

## 2023-10-09 DIAGNOSIS — R11.0 NAUSEA: ICD-10-CM

## 2023-10-09 DIAGNOSIS — M54.6 CHRONIC RIGHT-SIDED THORACIC BACK PAIN: ICD-10-CM

## 2023-10-09 DIAGNOSIS — G89.29 CHRONIC RIGHT-SIDED LOW BACK PAIN WITHOUT SCIATICA: ICD-10-CM

## 2023-10-09 DIAGNOSIS — G89.29 CHRONIC RIGHT-SIDED THORACIC BACK PAIN: ICD-10-CM

## 2023-10-09 DIAGNOSIS — M67.912 TENDINOPATHY OF LEFT ROTATOR CUFF: Primary | ICD-10-CM

## 2023-10-09 DIAGNOSIS — M54.50 CHRONIC RIGHT-SIDED LOW BACK PAIN WITHOUT SCIATICA: ICD-10-CM

## 2023-10-09 PROCEDURE — 72072 X-RAY EXAM THORAC SPINE 3VWS: CPT

## 2023-10-09 PROCEDURE — 99214 OFFICE O/P EST MOD 30 MIN: CPT | Performed by: PHYSICIAN ASSISTANT

## 2023-10-09 PROCEDURE — 72110 X-RAY EXAM L-2 SPINE 4/>VWS: CPT

## 2023-10-09 RX ORDER — CYCLOBENZAPRINE HCL 5 MG
5 TABLET ORAL 3 TIMES DAILY PRN
Qty: 20 TABLET | Refills: 0 | Status: CANCELLED | OUTPATIENT
Start: 2023-10-09

## 2023-10-09 RX ORDER — NAPROXEN 500 MG/1
500 TABLET ORAL 2 TIMES DAILY PRN
Qty: 30 TABLET | Refills: 0 | Status: CANCELLED | OUTPATIENT
Start: 2023-10-09

## 2023-10-09 RX ORDER — FAMOTIDINE 20 MG/1
20 TABLET, FILM COATED ORAL 2 TIMES DAILY
Qty: 180 TABLET | Refills: 2 | Status: SHIPPED | OUTPATIENT
Start: 2023-10-09

## 2023-10-09 NOTE — PROGRESS NOTES
Name: Christy Medrano      : 1975      MRN: [id number]  Encounter Provider: Derek Velazquez PA-C  Encounter Date: 10/9/2023   Encounter department: [address] [place] [address]     1. Tendinopathy of left rotator cuff  -     Ambulatory Referral to Physical Therapy; Future    2. Chronic right-sided low back pain without sciatica  -     XR spine lumbar minimum 4 views non injury; Future; Expected date: 10/09/2023  -     Ambulatory Referral to Physical Therapy; Future    3. Chronic right-sided thoracic back pain  -     XR spine thoracic 3 vw; Future; Expected date: 10/09/2023  -     Ambulatory Referral to Physical Therapy; Future    4. Nausea  -     famotidine (PEPCID) 20 mg tablet; Take 1 tablet (20 mg total) by mouth 2 (two) times a day    ongoing L sided thoracic/lumbar back pain. Seems muscular in nature, would benefit from manual massage. Seek XRs, continue PT. If ongoing can consider MRI. PT for the shoulder as well. Follow up prn       Subjective     Pt presents for follow up. Was seen about 3 months ago with concerns of R sided mid/low back pain without radiation, seemingly muscular in nature. No improvement with nsaids, flexeril and little improvement with PT. She continues to have paraspinal pain and pain with certain movements    More recently she reached back behind her seat in her car and felt a "pop" of the L shoulder now having pain with ROM. Review of Systems   Constitutional: Negative. Musculoskeletal: Positive for arthralgias and back pain. Neurological: Negative for weakness and numbness. Past Medical History:   Diagnosis Date   • Acid reflux disease    • Allergic rhinitis, seasonal    • Asthma    • Hiatal hernia    • Migraine    • Ulcer of gastroesophageal junction      Past Surgical History:   Procedure Laterality Date   • NE EGD TRANSORAL BIOPSY SINGLE/MULTIPLE N/A 2019    Procedure: ESOPHAGOGASTRODUODENOSCOPY (EGD);   Surgeon: Sherren Sins, DO; Location: [facility];   Service: Gastroenterology   • WISDOM TOOTH EXTRACTION       Family History   Problem Relation Age of Onset   • Anxiety disorder Mother    • Hyperlipidemia Mother    • Anxiety disorder Sister    • Anxiety disorder Daughter    • Diabetes Maternal Grandmother    • No Known Problems Maternal Grandfather    • No Known Problems Paternal Grandmother    • No Known Problems Paternal Grandfather    • No Known Problems Brother    • No Known Problems Brother    • Anxiety disorder Brother    • No Known Problems Brother    • Asthma Son    • No Known Problems Son    • No Known Problems Maternal Aunt    • No Known Problems Maternal Aunt    • No Known Problems Maternal Aunt    • No Known Problems Maternal Aunt    • No Known Problems Maternal Uncle    • Breast cancer Neg Hx    • Colon cancer Neg Hx    • Ovarian cancer Neg Hx    • Uterine cancer Neg Hx    • Cervical cancer Neg Hx      Social History     Socioeconomic History   • Marital status: /Civil Union     Spouse name: None   • Number of children: None   • Years of education: None   • Highest education level: None   Occupational History   • None   Tobacco Use   • Smoking status: Never   • Smokeless tobacco: Never   Vaping Use   • Vaping Use: Never used   Substance and Sexual Activity   • Alcohol use: Not Currently     Comment: social   • Drug use: No   • Sexual activity: Yes     Partners: Male     Birth control/protection: None     Comment:    Other Topics Concern   • None   Social History Narrative   • None     Social Determinants of Health     Financial Resource Strain: Not on file   Food Insecurity: Not on file   Transportation Needs: Not on file   Physical Activity: Not on file   Stress: Not on file   Social Connections: Not on file   Intimate Partner Violence: Not on file   Housing Stability: Not on file     Current Outpatient Medications on File Prior to Visit   Medication Sig   • amitriptyline (ELAVIL) 25 mg tablet Take 1 tablet (25 mg total) by mouth daily at bedtime   • naproxen (Naprosyn) 500 mg tablet Take 1 tablet (500 mg total) by mouth 2 (two) times a day as needed for mild pain   • [DISCONTINUED] famotidine (PEPCID) 20 mg tablet TAKE 1 TABLET BY MOUTH TWICE A DAY   • butalbital-acetaminophen-caffeine (FIORICET,ESGIC) -40 mg per tablet Take 1 tablet by mouth every 4 (four) hours as needed for headaches (Patient not taking: Reported on 8/22/2023)   • cyclobenzaprine (FLEXERIL) 5 mg tablet Take 1 tablet (5 mg total) by mouth 3 (three) times a day as needed for muscle spasms   • fluticasone (FLONASE) 50 mcg/act nasal spray SPRAY 1 SPRAY INTO EACH NOSTRIL EVERY DAY (Patient not taking: Reported on 8/22/2023)     No Known Allergies  Immunization History   Administered Date(s) Administered   • COVID-19, unspecified 11/06/2021, 01/07/2022       Objective     /70 (BP Location: Left arm, Patient Position: Sitting, Cuff Size: Large)   Pulse 85   Temp 98 °F (36.7 °C)   Ht 5' 1.5" (1.562 m)   Wt 88 kg (194 lb)   SpO2 99%   BMI 36.06 kg/m²     Physical Exam  Vitals and nursing note reviewed. Constitutional:       Appearance: Normal appearance. HENT:      Head: Normocephalic and atraumatic. Pulmonary:      Effort: Pulmonary effort is normal. No respiratory distress. Musculoskeletal:      Left shoulder: Tenderness (anterior) present. No bony tenderness or crepitus. Thoracic back: Tenderness present. Normal range of motion. Lumbar back: Tenderness present. No bony tenderness. Normal range of motion. Negative right straight leg raise test and negative left straight leg raise test.      Comments: L shoulder with +empty can, +neer/hernandez, +apprehension. Decreased ROM. Skin:     General: Skin is warm and dry. Neurological:      Mental Status: She is alert.        Mansoor Carrion PA-C

## 2023-10-10 ENCOUNTER — OFFICE VISIT (OUTPATIENT)
Dept: PHYSICAL THERAPY | Facility: CLINIC | Age: 48
End: 2023-10-10
Payer: COMMERCIAL

## 2023-10-10 DIAGNOSIS — G89.29 CHRONIC RIGHT-SIDED LOW BACK PAIN WITHOUT SCIATICA: ICD-10-CM

## 2023-10-10 DIAGNOSIS — M54.50 CHRONIC RIGHT-SIDED LOW BACK PAIN WITHOUT SCIATICA: ICD-10-CM

## 2023-10-10 DIAGNOSIS — M67.912 TENDINOPATHY OF LEFT ROTATOR CUFF: ICD-10-CM

## 2023-10-10 DIAGNOSIS — G89.29 CHRONIC RIGHT-SIDED THORACIC BACK PAIN: Primary | ICD-10-CM

## 2023-10-10 DIAGNOSIS — M54.6 CHRONIC RIGHT-SIDED THORACIC BACK PAIN: Primary | ICD-10-CM

## 2023-10-10 PROCEDURE — 97112 NEUROMUSCULAR REEDUCATION: CPT

## 2023-10-10 PROCEDURE — 97140 MANUAL THERAPY 1/> REGIONS: CPT

## 2023-10-10 NOTE — PROGRESS NOTES
Daily Note     Today's date: 10/10/2023  Patient name: Efren Hoyt  : 1975  MRN: [id number]  Referring provider: Gris Zaragoza PA-C  Dx:   Encounter Diagnosis     ICD-10-CM    1. Chronic right-sided thoracic back pain  M54.6     G89.29       2. Chronic right-sided low back pain without sciatica  M54.50     G89.29                      Subjective: Patient reports that she continues to have the right sided back pain. She sees some relief from performing the foam roll stretches at home. She had a follow up with her PCP who wants her to continue PT for the back. She also was referred for a brain MRI from the ENT. Objective: See treatment diary below      Assessment: Tolerated treatment well. Performed IASTM to facilitate decrease muscle tension, improved flexibility, and decreased pain. She was educated on side effects of redness/bruising and verbalized understanding. She reported feeling significantly improved tightness and pain following. Patient demonstrated fatigue post treatment, exhibited good technique with therapeutic exercises and would benefit from continued PT      Plan: Continue per plan of care. Precautions: standard   Access Code: [email or web address]       Lumbar  Vertigo  POC expires Auth Status Unit limit Start date  Expiration date PT/OT + Visit Limit?  Copay/ Co- Insurance   23      N/A N/A 24 120 per cly               Visit/Unit Tracking  AUTH Status:  Date 8/22 8/28 8/29 9/7 9/11 9/14 9/18 9/21 9/28 10/10     120 per cly Used 1 2 3 4 5  6 7 8 9 10      Remaining  119 118 117 116 115 114 113 112 111 110         Manuals  10/10   Central PA mobs to T3-T8             R UL PA mobs to T3-T8             IASTM to R mid to lower thoracic  paraspinal musculature          BE   Reassesment        BE     Neuro Re-Ed             Scapular retractions HEP            TB rows  GTB 5" 2x10 GTB 5" 2x10 GTB 5" 2x10  GTB 2x 10  GTB 2x 10  GTB 3x10 TB pulldowns   GTB 5" 2x10  GTB 5" 2x10 GTB 5" 2x 10  GTB 2x 10  GTB 2x 10  GTB 3x10      B/L shoulder ER with TB   GTB 5" 2x10   GTB 5" 2x 10  GTB 5" 2x 10       TB diagonal pull aparts      GTB 2x 10        Prone alt LEs         5" x10 ea    TB snow angels        RTB x10  VCs   RTB x10    TA bridges         5" 2x10 5" 2x10    Supine alt UE/LE         5" x10 5"x10    TB trunk anti-rotation          RTB 10"x10    Pt education PT POC, HEP, postural awareness, lumbar roll, BPPV and vestibular anatomy DOMS, BPPV DOMS    Posture, lumbar roll, ENT Centralization of symptoms, repeated movements, posture, HEP      Ther Ex             UBE fwd and retro for thoracic ROM and postural muscles  4'/4' 4'/4' 4'/4' 4'/4' 4'/4' 4'/4'  4'/4' 4'/4'   R UT and levator stretch HEP  reviewed          Seated thoracic ext in chair HEP 10"x10 with 1/2 foam roll  10"x10 with 1/2 foam roll          B/L open books  10"x10  10"x10   10" x10        Foam roll protocol- hugs, punches, swimmers, snow angels    1 min ea 1 min ea. Hold  1 min ea.   1 min ea  1 min ea     PPU         3x10 2x10  2x10   MARCO ANTONIO        x3 min                               Ther Activity                                       Gait Training                                       Modalities

## 2023-10-16 ENCOUNTER — OFFICE VISIT (OUTPATIENT)
Dept: PHYSICAL THERAPY | Facility: CLINIC | Age: 48
End: 2023-10-16
Payer: COMMERCIAL

## 2023-10-16 DIAGNOSIS — M54.50 CHRONIC RIGHT-SIDED LOW BACK PAIN WITHOUT SCIATICA: ICD-10-CM

## 2023-10-16 DIAGNOSIS — M54.6 CHRONIC RIGHT-SIDED THORACIC BACK PAIN: Primary | ICD-10-CM

## 2023-10-16 DIAGNOSIS — M67.912 TENDINOPATHY OF LEFT ROTATOR CUFF: ICD-10-CM

## 2023-10-16 DIAGNOSIS — G89.29 CHRONIC RIGHT-SIDED LOW BACK PAIN WITHOUT SCIATICA: ICD-10-CM

## 2023-10-16 DIAGNOSIS — G89.29 CHRONIC RIGHT-SIDED THORACIC BACK PAIN: Primary | ICD-10-CM

## 2023-10-16 PROCEDURE — 97110 THERAPEUTIC EXERCISES: CPT

## 2023-10-16 PROCEDURE — 97112 NEUROMUSCULAR REEDUCATION: CPT

## 2023-10-16 NOTE — PROGRESS NOTES
Daily Note     Today's date: 10/16/2023  Patient name: Mary Bae  : 1975  MRN: [id number]  Referring provider: Zak Antonio PA-C  Dx:   Encounter Diagnosis     ICD-10-CM    1. Chronic right-sided thoracic back pain  M54.6     G89.29       2. Chronic right-sided low back pain without sciatica  M54.50     G89.29       3. Tendinopathy of left rotator cuff  M67.912           Start Time: 1800  Stop Time: 1843  Total time in clinic (min): 43 minutes    Subjective: Pt noted that she has been having a lot of R sided UT p! Today radiating from her R sided LB. Objective: See treatment diary below      Assessment: Continued with treatment session, secondary to pt having some increase in R UT p! Hold on prone on elbows and prone press ups. Overall pt did note a + relief with theracane to Upper trap. Tolerated treatment well. Patient exhibited good technique with therapeutic exercises and would benefit from continued PT. S/P treatment session pt noted form R UT relief but noted some soreness in her LB. Education reviewed with patient with verbal agreement and understanding.   - HEP compliance. At this time noted that she has been able to complete on a daily basis. - DOMS 24 to 48 hours of muscle soreness. Plan: Continue per plan of care. Precautions: standard   Access Code: [email or web address]       Lumbar  Vertigo  POC expires Auth Status Unit limit Start date  Expiration date PT/OT + Visit Limit?  Copay/ Co- Insurance   23      N/A N/A 24 120 per cly               Visit/Unit Tracking  AUTH Status:  Date 8/22 8/28 8/29 9/7 9/11 9/14 9/18 9/21 9/28 10/10 10/16    120 per cly Used 1 2 3 4 5  6 7 8 9 10 11     Remaining  119 118 117 116 115 114 113 112 111 110 109        Manuals 10/16  8/29 9/7 9/11 9/14 9/18 9/21 9/28 10/10   Central PA mobs to T3-T8             R UL PA mobs to T3-T8             IASTM to R mid to lower thoracic  paraspinal musculature          BE   Reassesment        BE Neuro Re-Ed             Scapular retractions             TB rows   GTB 5" 2x10 GTB 5" 2x10  GTB 2x 10  GTB 2x 10  GTB 3x10      TB pulldowns    GTB 5" 2x10 GTB 5" 2x 10  GTB 2x 10  GTB 2x 10  GTB 3x10      B/L shoulder ER with TB   GTB 5" 2x10   GTB 5" 2x 10  GTB 5" 2x 10       TB diagonal pull aparts      GTB 2x 10        Prone alt LEs         5" x10 ea    TB snow angels        RTB x10  VCs   RTB x10    TA bridges 3x 10 5"        5" 2x10 5" 2x10    Supine alt UE/LE 2x 10         5" x10 5"x10    TB trunk anti-rotation          RTB 10"x10    Pt education   DOMS    Posture, lumbar roll, ENT Centralization of symptoms, repeated movements, posture, HEP      Ther Ex             UBE fwd and retro for thoracic ROM and postural muscles 4'/4'  4'/4' 4'/4' 4'/4' 4'/4' 4'/4'  4'/4' 4'/4'   R UT and levator stretch 30"x 3   reviewed          Thera cane massage  R UT 5 min             Seated thoracic ext in chair   10"x10 with 1/2 foam roll          B/L open books   10"x10   10" x10        Foam roll protocol- hugs, punches, swimmers, snow angels    1 min ea 1 min ea. Hold  1 min ea.   1 min ea  1 min ea     PPU  Hold  due to UT        3x10 2x10  2x10   MARCO ANTONIO Hold due to UT        x3 min                               Ther Activity                                       Gait Training                                       Modalities

## 2023-10-19 ENCOUNTER — TELEPHONE (OUTPATIENT)
Dept: FAMILY MEDICINE CLINIC | Facility: CLINIC | Age: 48
End: 2023-10-19

## 2023-10-19 DIAGNOSIS — M54.50 CHRONIC RIGHT-SIDED LOW BACK PAIN WITHOUT SCIATICA: Primary | ICD-10-CM

## 2023-10-19 DIAGNOSIS — G89.29 CHRONIC RIGHT-SIDED LOW BACK PAIN WITHOUT SCIATICA: Primary | ICD-10-CM

## 2023-10-19 DIAGNOSIS — R09.81 NASAL CONGESTION: ICD-10-CM

## 2023-10-19 RX ORDER — FLUTICASONE PROPIONATE 50 MCG
1 SPRAY, SUSPENSION (ML) NASAL DAILY
Qty: 15 ML | Refills: 1 | Status: SHIPPED | OUTPATIENT
Start: 2023-10-19

## 2023-10-19 NOTE — TELEPHONE ENCOUNTER
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 .. she is asking if this is anything that she needs to be concerned about. . is there any treatment ? ??

## 2023-10-20 NOTE — PROGRESS NOTES
PT Evaluation     Today's date: 10/23/2023  Patient name: Corinna Miller  : 1975  MRN: [id number]  Referring provider: Kassidy Bosch PA-C  Dx:   Encounter Diagnosis     ICD-10-CM    1. Chronic right-sided thoracic back pain  M54.6     G89.29       2. Chronic right-sided low back pain without sciatica  M54.50     G89.29                      Assessment  Assessment details: Corinna Miller is a 57 y.o. female presenting to physical therapy for a reevaluation with a MD referral of chronic right-sided thoracic back pain, chronic right-sided low back pain without sciatica. Since her initial evaluation, she reports 80% improvement in her back. She has demonstrated improved thoracic and lumbar ROM to [facility] as well as improved core stabilization and lower extremity strength. She reports decreased average pain levels with improved ability to complete her vocational responsibilities and household tasks because of this. At this time, she would like to be DC to a HEP for her thoracic/lumbar spine. She did have a recent injury to her shoulder when reaching into the back seat of her car and was referred to OP PT for tendinopathy of left rotator cuff. She displays decreased and painful left shoulder ROM, decreased shoulder strength, and limited tolerance to completing functional activities of overhead reaching, reaching behind her back, lifting and carrying groceries in the left arm. This patient would benefit from continued PT services to address their impairments and functional limitations to maximize functional outcome. She was educated on her updated PT POC to address her shoulder as well as performance of her HEP for continued improvements of her thoracic/lumbar pain.   Impairments: abnormal or restricted ROM, activity intolerance, impaired physical strength, lacks appropriate home exercise program, pain with function and poor posture     Symptom irritability: moderateUnderstanding of Dx/Px/POC: excellent   Prognosis: good    Goals  STG to be achieved in 4 weeks: The patient will report a decrease in thoracic/lumbar "at worst" subjective pain rating score to at least 5/10 to allow for improved tolerance for functional activities. MET  The patient will increase left lateral flexion AROM to at least minimally restricted degrees to allow for improved functional mobility. MET  The patient will report resolution of room spinning dizziness when laying down or sitting up from bed. MET  The patient will improve left shoulder abduction AROM to 125 degrees to allow for improved shoulder functional mobility. The patient will improve left shoulder abduction MMT score to at least 4-/5 to allow for improved shoulder functional mobility. LTG to be achieved by DC: The patient will be independent in comprehensive HEP. MET  The patient will report no pain with usual activities. MET back, NOT MET shoulder  The patient will improve all lumbar and thoracic AROM to [facility] to allow for improved functional mobility. MET  The patient will increase bilateral lower trapezius MMT score to 4+/5 to allow for improved postural stabilization. NOT MET  The patient will demonstrate improved postural awareness, with performance of appropriate stretches as needed, to allow for completion of her vocational responsibilities. MET  The patient will improve all left shoulder AROM to [facility] to allow for improved shoulder functional mobility. The patient will improve left shoulder MMT scores to [facility] to allow for improved completion of functional activities. The patient will be able to reach into her back seat and behind her back without pain or difficulty. The patient will be able to lift and carry groceries with the left shoulder without pain or difficulty.          Plan  Patient would benefit from: skilled physical therapy  Planned modality interventions: thermotherapy: hydrocollator packs, TENS and cryotherapy  Planned therapy interventions: manual therapy, joint mobilization, neuromuscular re-education, patient education, flexibility, strengthening, stretching, therapeutic activities, therapeutic exercise, home exercise program and postural training  Frequency: 2x week  Duration in weeks: 8  Plan of Care beginning date: 10/23/2023  Plan of Care expiration date: 12/18/2023  Treatment plan discussed with: patient        Subjective Evaluation    History of Present Illness  Mechanism of injury: Joie Hoang reports 80% improvement since beginning PT services. She reports that overall, her pain intensity is less intense. She says that while she isn't having zero pain in her back, there are days where her back can decrease to a 4/10. She says that today was the first day that her pain has been less than a 4/10, as she is a 2/10. She says that she has been doing her core stabilization exercises and stretches regularly which she feels helps with her pain. She does have soreness after exercises and she uses a heating pad to help with this. She did see her PCP on 10/9/23 and discussed a recent shoulder injury which occurred when reaching into the back seat of her car for purse. She says that because her back has improved and she knows the exercises to be performed at home, she would like to transition to treating her shoulder in PT. She was referred for PT by her PCP. She reports that she has pain/difficulty with picking up grocery bags, reaching overhead, or reach behind her back to her bra strap, reaching to the back seat of her car. She reports that the pain occurs in the anterior aspect of the shoulder and denies any radiating symptoms down the arm. She denies numbness/tingling. She reports that over the weekend she bumped her arm on a scaffolding pole on the street in [facility] and since then her arm has been hurting more and is also bruised.      Patient Goals  Patient goals for therapy: decreased pain  Patient goal: to not have back pain or dizziness   Pain  Pain scale: Back: 2/10 L Shoulder: 0/10. Pain scale at lowest: Back: 2/10 L Shoulder: 0/10. Pain scale at highest: Back: 5/10 L Shoulder: 9/10. 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 Right   No palpable tenderness to the erector spinae. Tenderness     Left Shoulder   Tenderness in the biceps tendon (proximal) and supraspinatus tendon. No tenderness in the [facility] joint, clavicle, infraspinatus tendon, lateral scapula, medial scapula, scapular spine and SC joint. Lumbar Spine  No tenderness in the spinous process, left transverse process and right transverse process. Left Hip   No tenderness in the ASIS. Right Hip   No tenderness in the ASIS.      Neurological Testing     Sensation   Cervical/Thoracic   Left   Intact: light touch    Right   Intact: light touch    Reflexes   Left   Biceps (C5/C6): normal (2+)  Brachioradialis (C6): normal (2+)  Triceps (C7): normal (2+)    Right   Biceps (C5/C6): normal (2+)  Brachioradialis (C6): normal (2+)  Triceps (C7): normal (2+)    Active Range of Motion   Cervical/Thoracic Spine       Thoracic    Flexion:  WFL  Extension:  WFL  Left lateral flexion:  WFL  Right lateral flexion:  WFL  Left rotation:  WFL  Right rotation:  [facility]  Left Shoulder   Flexion: 145 degrees with pain  Abduction: 105 degrees with pain  External rotation BTH: T3   Internal rotation BTB: Active internal rotation behind the back: L glute.  with pain    Right Shoulder   Flexion: 160 degrees   Abduction: 160 degrees   External rotation BTH: T3   Internal rotation BTB: T6     Lumbar   Flexion:  WFL  Extension:  WFL  Left lateral flexion:  WFL  Right lateral flexion:  WFL  Left rotation:  WFL  Right rotation:  WFL    Passive Range of Motion   Left Shoulder   Flexion: 138 degrees with pain  Abduction: 135 degrees with pain  External rotation 45°: 51 degrees with pain  Internal rotation 45°: 55 degrees with pain    Joint Play   Joints within functional limits: T8, T9, T10, T11, T12, L1, L2, L3, L4 and L5     Hypomobile: T3, T4, T5, T6 and T7     Pain: T3, T4, T5, T6 and T7   Mechanical Assessment    Cervical      Thoracic    Lying extension: sustained positions  Pain intensity: better  Pain level: decreased    Lumbar      Strength/Myotome Testing   Cervical Spine     Left   Interossei strength (t1): 4+    Right   Interossei strength (t1): 4+    Left Shoulder     Planes of Motion   Flexion: 3+   Abduction: 3+   External rotation at 0°: 4-   Internal rotation at 0°: 4-     Isolated Muscles   Lower trapezius: 3+   Middle trapezius: 3+     Right Shoulder     Planes of Motion   Flexion: 4+   Abduction: 4+   External rotation at 0°: 4+   Internal rotation at 0°: 4+     Isolated Muscles   Lower trapezius: 4   Middle trapezius: 4     Left Elbow   Flexion: 4+  Extension: 4+    Right Elbow   Flexion: 4+  Extension: 4+    Left Wrist/Hand   Wrist extension: 4+  Wrist flexion: 4+    Right Wrist/Hand   Wrist extension: 4+  Wrist flexion: 4+    Left Hip   Planes of Motion   Flexion: 5  Extension: 4+  Abduction: 4+    Right Hip   Planes of Motion   Flexion: 5  Extension: 4+  Abduction: 4+    Left Knee Flexion: 5  Extension: 5    Right Knee   Flexion: 5  Extension: 5    Left Ankle/Foot   Dorsiflexion: 5  Plantar flexion: 5    Right Ankle/Foot   Dorsiflexion: 5  Plantar flexion: 5    Tests     Left Shoulder   Positive empty can, full can, Hawkin's and Neer's. Negative AC shear, drop arm, external rotation lag sign, Evelyn/Lin, passive horizontal adduction and Speed's. Precautions: standard   Access Code: [email or web address]      Shoulder  POC expires Unit limit Expiration date PT/OT + Visit Limit?  Copay/ Co- Insurance   12/18/23      N/A 5/31/24 120 per cly             Visit/Unit Tracking  AUTH Status:  Date  8/28 8/29 9/7 9/11 9/14 9/18 9/21 9/28 10/10 10/16 10/23   120 per cly Used  2 3 4 5  6 7 8 9 10 11 12    Remaining   118 117 116 115 114 113 112 111 110 109 108       Manuals 10/16 10/23 8/29 9/7 9/11 9/14 9/18 9/21 9/28 10/10   Central PA mobs to T3-T8  DC           R UL PA mobs to T3-T8  DC           IASTM to R mid to lower thoracic  paraspinal musculature  DC        BE   L shoulder PROM             Reassesment  BE      BE     Neuro Re-Ed             TB rows   GTB 5" 2x10 GTB 5" 2x10  GTB 2x 10  GTB 2x 10  GTB 3x10      TB pulldowns    GTB 5" 2x10 GTB 5" 2x 10  GTB 2x 10  GTB 2x 10  GTB 3x10      B/L shoulder ER with TB   GTB 5" 2x10   GTB 5" 2x 10  GTB 5" 2x 10       TB diagonal pull aparts  DC    GTB 2x 10        Prone alt LEs  DC       5" x10 ea    TB snow angels   DC     RTB x10  VCs   RTB x10    TA bridges 3x 10 5" DC       5" 2x10 5" 2x10    Supine alt UE/LE 2x 10  DC       5" x10 5"x10    TB trunk anti-rotation  DC        RTB 10"x10    Prone Y,T,I   L shoulder only             Prone row L only             Ther Ex             UBE fwd and retro for thoracic ROM and postural muscles 4'/4' 4'/4' 4'/4' 4'/4' 4'/4' 4'/4' 4'/4'  4'/4' 4'/4'   R UT and levator stretch 30"x 3  DC reviewed          Thera cane massage  R UT 5 min  DC           Seated thoracic ext in chair  DC 10"x10 with 1/2 foam roll B/L open books  DC 10"x10   10" x10        Foam roll protocol- hugs, punches, swimmers, snow angels   DC 1 min ea 1 min ea. Hold  1 min ea.   1 min ea  1 min ea     PPU  Hold  due to UT  DC      3x10 2x10  2x10   MARCO ANTONIO Hold due to UT  DC      x3 min     TB ER             TB IR              Standing shoulder 3 way raises             Shoulder IR stretch with strap             Sidelying shoulder ER             Pt education  PT POC, shoulder anatomy/pathophysiology           Ther Activity                                       Gait Training                                       Modalities

## 2023-10-23 ENCOUNTER — EVALUATION (OUTPATIENT)
Dept: PHYSICAL THERAPY | Facility: CLINIC | Age: 48
End: 2023-10-23
Payer: COMMERCIAL

## 2023-10-23 DIAGNOSIS — M54.50 CHRONIC RIGHT-SIDED LOW BACK PAIN WITHOUT SCIATICA: ICD-10-CM

## 2023-10-23 DIAGNOSIS — M67.912 TENDINOPATHY OF LEFT ROTATOR CUFF: Primary | ICD-10-CM

## 2023-10-23 DIAGNOSIS — G89.29 CHRONIC RIGHT-SIDED THORACIC BACK PAIN: ICD-10-CM

## 2023-10-23 DIAGNOSIS — G89.29 CHRONIC RIGHT-SIDED LOW BACK PAIN WITHOUT SCIATICA: ICD-10-CM

## 2023-10-23 DIAGNOSIS — M54.6 CHRONIC RIGHT-SIDED THORACIC BACK PAIN: ICD-10-CM

## 2023-10-23 PROCEDURE — 97110 THERAPEUTIC EXERCISES: CPT

## 2023-10-23 PROCEDURE — 97161 PT EVAL LOW COMPLEX 20 MIN: CPT

## 2023-10-26 ENCOUNTER — HOSPITAL ENCOUNTER (OUTPATIENT)
Dept: MRI IMAGING | Facility: HOSPITAL | Age: 48
Discharge: HOME | End: 2023-10-26
Payer: COMMERCIAL

## 2023-10-26 ENCOUNTER — TELEPHONE (OUTPATIENT)
Dept: PHYSICAL THERAPY | Facility: CLINIC | Age: 48
End: 2023-10-26

## 2023-10-26 DIAGNOSIS — R42 DIZZINESS: ICD-10-CM

## 2023-10-26 PROCEDURE — 70553 MRI BRAIN STEM W/O & W/DYE: CPT

## 2023-10-26 PROCEDURE — G1004 CDSM NDSC: HCPCS

## 2023-10-26 PROCEDURE — A9585 GADOBUTROL INJECTION: HCPCS | Performed by: SPECIALIST

## 2023-10-26 RX ORDER — GADOBUTROL 604.72 MG/ML
INJECTION INTRAVENOUS
Status: CANCELLED | OUTPATIENT
Start: 2023-10-26

## 2023-10-26 RX ORDER — GADOBUTROL 604.72 MG/ML
8 INJECTION INTRAVENOUS
Status: COMPLETED | OUTPATIENT
Start: 2023-10-26 | End: 2023-10-26

## 2023-10-26 RX ADMIN — GADOBUTROL 8 ML: 604.72 INJECTION INTRAVENOUS at 16:52

## 2023-10-26 NOTE — TELEPHONE ENCOUNTER
LVM for patient regarding NS to appt on 10/26/23 at 5:45pm. Advised patient of upcoming appt on 10/30/23 at 5:45pm

## 2023-10-30 ENCOUNTER — OFFICE VISIT (OUTPATIENT)
Dept: PHYSICAL THERAPY | Facility: CLINIC | Age: 48
End: 2023-10-30
Payer: COMMERCIAL

## 2023-10-30 DIAGNOSIS — M67.912 TENDINOPATHY OF LEFT ROTATOR CUFF: Primary | ICD-10-CM

## 2023-10-30 PROCEDURE — 97110 THERAPEUTIC EXERCISES: CPT

## 2023-10-30 PROCEDURE — 97112 NEUROMUSCULAR REEDUCATION: CPT

## 2023-10-30 NOTE — PROGRESS NOTES
Daily Note     Today's date: 10/30/2023  Patient name: Juli Leiva  : 1975  MRN: [id number]  Referring provider: Alejandro Bella PA-C  Dx:   Encounter Diagnosis     ICD-10-CM    1. Tendinopathy of left rotator cuff  M67.912           Start Time: 114  Stop Time:   Total time in clinic (min): 46 minutes    Subjective: Pt noted that she has no changes in her L shoulder upon arrival for PT. Objective: See treatment diary below      Assessment:  Continued with treatment session with focus on L shoulder. Overall progressions were within pain free ranges. She did note some increase challenge with shoulder abduction > than all other exercises. Tolerated treatment well. Patient demonstrated fatigue post treatment, exhibited good technique with therapeutic exercises, and would benefit from continued PT. S/p treatment session noted no immediate changes but did mention some increase in muscle soreness. Education reviewed with patient with verbal agreement and understanding.   - HEP compliance. - DOMS 24 to 48 hours of muscle soreness. Plan: Continue per plan of care. Update medbridge with L shoulder progressions     Precautions: standard   Access Code: [email or web address]      Shoulder  POC expires Unit limit Expiration date PT/OT + Visit Limit?  Copay/ Co- Insurance   23      N/A 24 120 per cly             Visit/Unit Tracking  AUTH Status:  Date 10/30   9/7 9/11 9/14 9/18 9/21 9/28 10/10 10/16 10/23   120 per cly Used 13   4 5  6 7 8 9 10 11 12    Remaining  107   116 115 114 113 112 111 110 109 108       Manuals 10/16 10/23 10/30  9/11 9/14 9/18 9/21 9/28 10/10   Central PA mobs to T3-T8  DC           R UL PA mobs to T3-T8  DC           IASTM to R mid to lower thoracic  paraspinal musculature  DC        BE   L shoulder PROM             Reassesment  BE      BE     Neuro Re-Ed             TB rows     GTB 2x 10  GTB 2x 10  GTB 3x10      TB pulldowns      GTB 2x 10  GTB 2x 10  GTB 3x10      B/L shoulder ER with TB      GTB 5" 2x 10  GTB 5" 2x 10       Prone Y,T,I   L shoulder only   NV          Prone row L only   2x 10                                                  Ther Ex             UBE fwd and retro for L shoulder ROM and postural muscles 4'/4' 4'/4' 4'/4'  4'/4' 4'/4' 4'/4'  4'/4' 4'/4'   TB ER   RTB 2x 10  L only           TB IR    RTB 2x 10  L only           Standing shoulder 3 way raises   2x 10  1#           Shoulder IR stretch with strap             Sidelying shoulder ER             Pt education  PT POC, shoulder anatomy/pathophysiology           Ther Activity                                       Gait Training                                       Modalities

## 2023-11-01 ENCOUNTER — OFFICE VISIT (OUTPATIENT)
Dept: FAMILY MEDICINE CLINIC | Facility: CLINIC | Age: 48
End: 2023-11-01
Payer: COMMERCIAL

## 2023-11-01 VITALS
OXYGEN SATURATION: 98 % | WEIGHT: 194 LBS | SYSTOLIC BLOOD PRESSURE: 112 MMHG | TEMPERATURE: 97.7 F | DIASTOLIC BLOOD PRESSURE: 72 MMHG | HEIGHT: 62 IN | BODY MASS INDEX: 35.7 KG/M2 | HEART RATE: 91 BPM

## 2023-11-01 DIAGNOSIS — K12.2 INFECTION OF MOUTH: Primary | ICD-10-CM

## 2023-11-01 PROCEDURE — 99213 OFFICE O/P EST LOW 20 MIN: CPT

## 2023-11-01 RX ORDER — AMOXICILLIN 500 MG/1
500 TABLET, FILM COATED ORAL 2 TIMES DAILY
Qty: 14 TABLET | Refills: 0 | Status: SHIPPED | OUTPATIENT
Start: 2023-11-01 | End: 2023-11-08

## 2023-11-01 NOTE — PROGRESS NOTES
Name: Naz Schilling      : 1975      MRN: [id number]  Encounter Provider: Rad Young PA-C  Encounter Date: 2023   Encounter department: [address] [place] [address]     1. Infection of mouth  -     amoxicillin (AMOXIL) 500 MG tablet; Take 1 tablet (500 mg total) by mouth 2 (two) times a day for 7 days    Patient presents with lesion on the inside of her inner bottom lip x 3 days - popped lesion and now it has gotten larger, more swollen, and erythematous. On exam, lesion does appear to have associated swelling and erythema - therefore covering for infection with amoxicillin. Patient has been on medication before without any issues; discussed potential side effects. Educated patient on proper oral hygiene and advised her to schedule an appointment with her dentist for further management. To call if symptoms persist or worsen. Subjective      CC: blister in mouth     Patient presents for evaluation of blood filled blister that she first noticed this past . Notes she popped it and blood came out; now it seems to be larger, swollen, and red with "fluid still in it". No associated pain but patient is concerned that it is starting to get infected. Notes she has been using "oral B mouthwash" which seems to be helping a little. Patient sees dentist regularly for dental cleanings. Review of Systems   Constitutional:  Negative for appetite change, chills, diaphoresis, fatigue and fever. HENT:  Positive for mouth sores. Negative for congestion, dental problem, rhinorrhea, sore throat, trouble swallowing and voice change. Respiratory:  Negative for chest tightness and shortness of breath. Cardiovascular:  Negative for chest pain and palpitations.        Current Outpatient Medications on File Prior to Visit   Medication Sig    amitriptyline (ELAVIL) 25 mg tablet Take 1 tablet (25 mg total) by mouth daily at bedtime    butalbital-acetaminophen-caffeine (FIORICET,ESGIC) -40 mg per tablet Take 1 tablet by mouth every 4 (four) hours as needed for headaches (Patient not taking: Reported on 8/22/2023)    cyclobenzaprine (FLEXERIL) 5 mg tablet Take 1 tablet (5 mg total) by mouth 3 (three) times a day as needed for muscle spasms    diclofenac sodium (VOLTAREN) 50 mg EC tablet Take 1 tablet (50 mg total) by mouth 2 (two) times a day    famotidine (PEPCID) 20 mg tablet Take 1 tablet (20 mg total) by mouth 2 (two) times a day    fluticasone (FLONASE) 50 mcg/act nasal spray 1 spray into each nostril daily       Objective     /72 (BP Location: Left arm, Patient Position: Sitting, Cuff Size: Large)   Pulse 91   Temp 97.7 °F (36.5 °C)   Ht 5' 1.5" (1.562 m)   Wt 88 kg (194 lb)   SpO2 98%   BMI 36.06 kg/m²     Physical Exam  Vitals reviewed. Constitutional:       General: She is not in acute distress. Appearance: Normal appearance. She is not ill-appearing or diaphoretic. HENT:      Head: Normocephalic and atraumatic. Right Ear: External ear normal.      Left Ear: External ear normal.      Nose: Nose normal.      Mouth/Throat:      Mouth: Mucous membranes are moist. Oral lesions (approx 0.5 cm white lesion on inside of bottom lip; area is swollen with surrounding erythema. Painful to the touch; no active drainage.) present. Pharynx: Oropharynx is clear. No oropharyngeal exudate or posterior oropharyngeal erythema. Eyes:      General:         Right eye: No discharge. Left eye: No discharge. Conjunctiva/sclera: Conjunctivae normal.   Cardiovascular:      Rate and Rhythm: Normal rate and regular rhythm. Pulses: Normal pulses. Heart sounds: Normal heart sounds. No murmur heard. Pulmonary:      Effort: Pulmonary effort is normal.      Breath sounds: Normal breath sounds. No wheezing, rhonchi or rales. Musculoskeletal:         General: Normal range of motion. Cervical back: Normal range of motion and neck supple. Lymphadenopathy:      Cervical: No cervical adenopathy. Skin:     General: Skin is warm. Neurological:      Mental Status: She is alert and oriented to person, place, and time.       Gait: Gait normal.   Psychiatric:         Mood and Affect: Mood normal.       Prudence Landeros PA-C

## 2023-11-02 ENCOUNTER — OFFICE VISIT (OUTPATIENT)
Dept: PHYSICAL THERAPY | Facility: CLINIC | Age: 48
End: 2023-11-02
Payer: COMMERCIAL

## 2023-11-02 DIAGNOSIS — M67.912 TENDINOPATHY OF LEFT ROTATOR CUFF: Primary | ICD-10-CM

## 2023-11-02 PROCEDURE — 97110 THERAPEUTIC EXERCISES: CPT

## 2023-11-02 PROCEDURE — 97112 NEUROMUSCULAR REEDUCATION: CPT

## 2023-11-02 NOTE — PROGRESS NOTES
Daily Note     Today's date: 2023  Patient name: Corinna Miller  : 1975  MRN: [id number]  Referring provider: Kassidy Bosch PA-C  Dx:   Encounter Diagnosis     ICD-10-CM    1. Tendinopathy of left rotator cuff  M67.912                      Subjective: Patient reports that her shoulder is sore upon arrival for therapy. Objective: See treatment diary below      Assessment: Tolerated treatment well. Slight discomfort and difficulty reported with sidelying shoulder ER and standing shoulder abduction, however was able to complete. Patient demonstrated fatigue post treatment, exhibited good technique with therapeutic exercises, and would benefit from continued PT      Plan: Progress treatment as tolerated. Precautions: standard   Access Code: [email or web address]      Shoulder  POC expires Unit limit Expiration date PT/OT + Visit Limit?  Copay/ Co- Insurance   23      N/A 24 120 per cly             Visit/Unit Tracking  AUTH Status:  Date 10/30 11/2  9/7 9/11 9/14 9/18 9/21 9/28 10/10 10/16 10/23   120 per cly Used 13 14  4 5  6 7 8 9 10 11 12    Remaining  107 106  116 115 114 113 112 111 110 109 108       Manuals 10/16 10/23 10/30 11/2  9/14 9/18 9/21 9/28 10/10   L shoulder PROM             Reassesment  BE      BE     Neuro Re-Ed             TB rows    GTB 5" 2x10   GTB 2x 10  GTB 3x10      TB pulldowns     GTB 5" 2x10  GTB 2x 10  GTB 3x10      B/L shoulder ER with TB      GTB 5" 2x 10  GTB 5" 2x 10       Prone Y,T,I   L shoulder only   NV          Prone row L only   2x 10                                                  Ther Ex             UBE fwd and retro for L shoulder ROM and postural muscles 4'/4' 4'/4' 4'/4' 4'/4'  4'/4' 4'/4'  4'/4' 4'/4'   TB ER   RTB 2x 10  L only  RTB 2x10          TB IR    RTB 2x 10  L only  RTB 2x10          Standing shoulder 3 way raises   2x 10  1#  1# 2x10          Shoulder IR stretch with strap             Sidelying shoulder ER    1# 2x10          Pt education PT POC, shoulder anatomy/pathophysiology           Ther Activity                                       Gait Training                                       Modalities

## 2023-11-06 ENCOUNTER — OFFICE VISIT (OUTPATIENT)
Dept: PHYSICAL THERAPY | Facility: CLINIC | Age: 48
End: 2023-11-06
Payer: COMMERCIAL

## 2023-11-06 DIAGNOSIS — M67.912 TENDINOPATHY OF LEFT ROTATOR CUFF: Primary | ICD-10-CM

## 2023-11-06 PROCEDURE — 97112 NEUROMUSCULAR REEDUCATION: CPT

## 2023-11-06 NOTE — PROGRESS NOTES
Daily Note     Today's date: 2023  Patient name: Alejanrdo Nicholson  : 1975  MRN: [id number]  Referring provider: Alanis Collins PA-C  Dx:   Encounter Diagnosis     ICD-10-CM    1. Tendinopathy of left rotator cuff  M67.912           Start Time:   Stop Time: 183  Total time in clinic (min): 41 minutes    Subjective: Pt noted that she has been having some increase in R shoulder soreness but no other changes since last treatment session. Objective: See treatment diary below      Assessment:  Continued with treatment session with minimal progressions noted as of this date. Challenged with bent over ITY this visit. Was able to demonstrate proper form with all exercises. Tolerated treatment well. Patient demonstrated fatigue post treatment, exhibited good technique with therapeutic exercises, and would benefit from continued PT. S/p treatment session noted some muscle soreness. Education reviewed with patient with verbal agreement and understanding.   - HEP compliance. - DOMS 24 to 48 hours of muscle soreness. Plan: Continue per plan of care. Precautions: standard   Access Code: [email or web address]      Shoulder  POC expires Unit limit Expiration date PT/OT + Visit Limit?  Copay/ Co- Insurance   23      N/A 24 120 per cly             Visit/Unit Tracking  AUTH Status:  Date 10/30 11/2 11/6   9/14 9/18 9/21 9/28 10/10 10/16 10/23   120 per cly Used 13 14 15   6 7 8 9 10 11 12    Remaining  107 106 105   114 113 112 111 110 109 108       Manuals 10/16 10/23 10/30 11/2 11/6   9/21 9/28 10/10   L shoulder PROM             Reassesment  BE      BE     Neuro Re-Ed             TB rows    GTB 5" 2x10  GTB 2x 10 5"         TB pulldowns     GTB 5" 2x10 GTB 2x 10 5"         B/L shoulder ER with TB     NV add        Prone Y,T,I   L shoulder only   NV  Bent over  2x 10  1#         Prone row L only   2x 10   Bent over  2x 10                                                Ther Ex             UBE fwd and retro for L shoulder ROM and postural muscles 4'/4' 4'/4' 4'/4' 4'/4' 4'/4'    4'/4' 4'/4'   TB ER   RTB 2x 10  L only  RTB 2x10  RTB 2x 10         TB IR    RTB 2x 10  L only  RTB 2x10  RTB 2x 10         Standing shoulder 3 way raises   2x 10  1#  1# 2x10  1# 2x 10         Shoulder IR stretch with strap             Sidelying shoulder ER    1# 2x10  1# 2x 10         Pt education  PT POC, shoulder anatomy/pathophysiology           Ther Activity                                       Gait Training                                       Modalities                                                       1 on 1 time for 26 minutes on 11/6/23.

## 2023-11-09 ENCOUNTER — APPOINTMENT (OUTPATIENT)
Dept: PHYSICAL THERAPY | Facility: CLINIC | Age: 48
End: 2023-11-09
Payer: COMMERCIAL

## 2023-11-13 ENCOUNTER — OFFICE VISIT (OUTPATIENT)
Dept: PHYSICAL THERAPY | Facility: CLINIC | Age: 48
End: 2023-11-13
Payer: COMMERCIAL

## 2023-11-13 DIAGNOSIS — M67.912 TENDINOPATHY OF LEFT ROTATOR CUFF: Primary | ICD-10-CM

## 2023-11-13 PROCEDURE — 97112 NEUROMUSCULAR REEDUCATION: CPT

## 2023-11-13 PROCEDURE — 97110 THERAPEUTIC EXERCISES: CPT

## 2023-11-13 NOTE — PROGRESS NOTES
Daily Note     Today's date: 2023  Patient name: Silver Saeed  : 1975  MRN: [id number]  Referring provider: Damian Roque PA-C  Dx:   Encounter Diagnosis     ICD-10-CM    1. Tendinopathy of left rotator cuff  M67.912                      Subjective: Patient reports that her shoulder has been more sore recently after stretching her arm and using it to complete her exercises. She says that her shoulder just isn't used to it because she was self limiting her ROM previously due to pain. Objective: See treatment diary below      Assessment: Tolerated treatment well. Progressed patient with additional scapular stabilization exercises of TB wall walks and clocks as well as increased weight for standing shoulder 3 way raises. She noted muscular fatigue with progressions, however denied increased pain. She had pain with shoulder IR stretch with strap and was advised to complete only to the point of feeling a stretch. Patient demonstrated fatigue post treatment, exhibited good technique with therapeutic exercises, and would benefit from continued PT      Plan: Progress treatment as tolerated. Precautions: standard   Access Code: [email or web address]      Shoulder  POC expires Unit limit Expiration date PT/OT + Visit Limit?  Copay/ Co- Insurance   23      N/A 24 120 per cly             Visit/Unit Tracking  AUTH Status:  Date 10/30 11/2 11/6 11/13  9/14 9/18 9/21 9/28 10/10 10/16 10/23   120 per cly Used 13 14 15 16  6 7 8 9 10 11 12    Remaining  107 106 105 104  114 113 112 111 110 109 108       Manuals 10/16 10/23 10/30 11/2 11/6 11/13  9/21 9/28 10/10   L shoulder PROM             Reassesment  BE      BE     Neuro Re-Ed             TB rows    GTB 5" 2x10  GTB 2x 10 5"  GTB 5" 3x10        TB pulldowns     GTB 5" 2x10 GTB 2x 10 5"  GTB 5" 3x10        B/L shoulder ER with TB     NV add        Prone Y,T,I   L shoulder only   NV  Bent over  2x 10  1#         Prone row L only   2x 10   Bent over  2x 10 TB wall walks      RTB x4 laps       TB wall clocks      RTB x4 laps                     Ther Ex             UBE fwd and retro for L shoulder ROM and postural muscles 4'/4' 4'/4' 4'/4' 4'/4' 4'/4' 4'/4'   4'/4' 4'/4'   TB ER   RTB 2x 10  L only  RTB 2x10  RTB 2x 10  RTB 3x10       TB IR    RTB 2x 10  L only  RTB 2x10  RTB 2x 10  RTB 3x10       Standing shoulder 3 way raises   2x 10  1#  1# 2x10  1# 2x 10  2# 2x10 ea dir        Shoulder IR stretch with strap      10"x10       Sidelying shoulder ER    1# 2x10  1# 2x 10         Pt education  PT POC, shoulder anatomy/pathophysiology           Ther Activity                                       Gait Training                                       Modalities

## 2023-11-16 ENCOUNTER — OFFICE VISIT (OUTPATIENT)
Dept: PHYSICAL THERAPY | Facility: CLINIC | Age: 48
End: 2023-11-16
Payer: COMMERCIAL

## 2023-11-16 DIAGNOSIS — M67.912 TENDINOPATHY OF LEFT ROTATOR CUFF: Primary | ICD-10-CM

## 2023-11-16 PROCEDURE — 97110 THERAPEUTIC EXERCISES: CPT

## 2023-11-16 PROCEDURE — 97112 NEUROMUSCULAR REEDUCATION: CPT

## 2023-11-20 ENCOUNTER — OFFICE VISIT (OUTPATIENT)
Dept: PHYSICAL THERAPY | Facility: CLINIC | Age: 48
End: 2023-11-20
Payer: COMMERCIAL

## 2023-11-20 DIAGNOSIS — M67.912 TENDINOPATHY OF LEFT ROTATOR CUFF: Primary | ICD-10-CM

## 2023-11-20 PROCEDURE — 97140 MANUAL THERAPY 1/> REGIONS: CPT

## 2023-11-20 PROCEDURE — 97110 THERAPEUTIC EXERCISES: CPT

## 2023-11-20 NOTE — PROGRESS NOTES
Daily Note     Today's date: 2023  Patient name: Naz Schilling  : 1975  MRN: [id number]  Referring provider: Jim Granados PA-C  Dx:   Encounter Diagnosis     ICD-10-CM    1. Tendinopathy of left rotator cuff  M67.912                      Subjective: Patient reports that her shoulder continues to be painful with all movements of the arm, especially reaching behind her or quick sudden movements of the arm. Objective: See treatment diary below      Assessment: Tolerated treatment well. Challenged with sidelying shoulder ER this session and required verbal cues to maintain shoulder ER plane of motion and to avoid elbow flexion/extension. Performed IASTM to prox biceps tendon to decrease pain and improve shoulder ROM. She was educated on side effects of redness and bruising. Patient demonstrated fatigue post treatment, exhibited good technique with therapeutic exercises, and would benefit from continued PT      Plan: Continue per plan of care. Precautions: standard   Access Code: [email or web address]      Shoulder  POC expires Unit limit Expiration date PT/OT + Visit Limit?  Copay/ Co- Insurance   23      N/A 24 120 per cly             Visit/Unit Tracking  AUTH Status:  Date 10/30 11/2 11/6 11/13 11/16 11/20  9/21 9/28 10/10 10/16 10/23   120 per cly Used 13 14 15 16 17 18  8 9 10 11 12    Remaining  107 106 105 104 103 102  112 111 110 109 108       Manuals 10/16 10/23 10/30 11/2 11/6 11/13 11/16 11/20  10/10   L shoulder PROM             IASTM to L prox biceps tendon        BE     Reassesment  BE           Neuro Re-Ed             TB rows    GTB 5" 2x10  GTB 2x 10 5"  GTB 5" 3x10  GTB 3x 10 5"       TB pulldowns     GTB 5" 2x10 GTB 2x 10 5"  GTB 5" 3x10  GTB 3x 10 5"       B/L shoulder ER with TB     NV add        Prone Y,T,I   L shoulder only   NV  Bent over  2x 10  1#    2x10 ea dir bent over      Prone row L only   2x 10   Bent over  2x 10    2# 2x10 bent over      TB wall walks      RTB x4 laps RTB 4 laps       TB wall clocks      RTB x4 laps  RTB 4 laps                    Ther Ex             UBE fwd and retro for L shoulder ROM and postural muscles 4'/4' 4'/4' 4'/4' 4'/4' 4'/4' 4'/4' 4'/4' 4'/4'  4'/4'   TB ER   RTB 2x 10  L only  RTB 2x10  RTB 2x 10  RTB 3x10 RTB 3x 10  RTB 3x10     TB IR    RTB 2x 10  L only  RTB 2x10  RTB 2x 10  RTB 3x10 RTB 3x 10  RTB 3x10     Standing shoulder 3 way raises   2x 10  1#  1# 2x10  1# 2x 10  2# 2x10 ea dir   2# 2x10 ea dir      Shoulder IR stretch with strap      10"x10 10" x 10 VC's      Sidelying shoulder ER    1# 2x10  1# 2x 10    2x10 VCs     Pt education  PT POC, shoulder anatomy/pathophysiology           Ther Activity                                       Gait Training                                       Modalities

## 2023-11-21 DIAGNOSIS — R09.81 NASAL CONGESTION: ICD-10-CM

## 2023-11-21 RX ORDER — FLUTICASONE PROPIONATE 50 MCG
SPRAY, SUSPENSION (ML) NASAL
Qty: 48 ML | Refills: 1 | Status: SHIPPED | OUTPATIENT
Start: 2023-11-21

## 2023-11-24 NOTE — PROGRESS NOTES
Daily Note     Today's date: 2023  Patient name: Mary Bae  : 1975  MRN: [id number]  Referring provider: Zak Antonio PA-C  Dx:   Encounter Diagnosis     ICD-10-CM    1. Tendinopathy of left rotator cuff  M67.912                      Subjective: ***      Objective: See treatment diary below      Assessment: Tolerated treatment {Tolerated treatment :3236062903}. Patient {assessment:5200819988}      Plan: {PLAN:[id number]}     Precautions: standard   Access Code: [email or web address]      Shoulder  POC expires Unit limit Expiration date PT/OT + Visit Limit?  Copay/ Co- Insurance   23      N/A 24 120 per cly             Visit/Unit Tracking  AUTH Status:  Date 10/30 11/2 11/6 11/13 11/16 11/20  9/21 9/28 10/10 10/16 10/23   120 per cly Used 13 14 15 16 17 18  8 9 10 11 12    Remaining  107 106 105 104 103 102  112 111 110 109 108       Manuals 10/16 10/23 10/30 11/2 11/6 11/13 11/16 11/20  10/10   L shoulder PROM             IASTM to L prox biceps tendon        BE     Reassesment  BE           Neuro Re-Ed             TB rows    GTB 5" 2x10  GTB 2x 10 5"  GTB 5" 3x10  GTB 3x 10 5"       TB pulldowns     GTB 5" 2x10 GTB 2x 10 5"  GTB 5" 3x10  GTB 3x 10 5"       B/L shoulder ER with TB     NV add        Prone Y,T,I   L shoulder only   NV  Bent over  2x 10  1#    2x10 ea dir bent over      Prone row L only   2x 10   Bent over  2x 10    2# 2x10 bent over      TB wall walks      RTB x4 laps RTB 4 laps       TB wall clocks      RTB x4 laps  RTB 4 laps                    Ther Ex             UBE fwd and retro for L shoulder ROM and postural muscles 4'/4' 4'/4' 4'/4' 4'/4' 4'/4' 4'/4' 4'/4' 4'/4'  4'/4'   TB ER   RTB 2x 10  L only  RTB 2x10  RTB 2x 10  RTB 3x10 RTB 3x 10  RTB 3x10     TB IR    RTB 2x 10  L only  RTB 2x10  RTB 2x 10  RTB 3x10 RTB 3x 10  RTB 3x10     Standing shoulder 3 way raises   2x 10  1#  1# 2x10  1# 2x 10  2# 2x10 ea dir   2# 2x10 ea dir      Shoulder IR stretch with strap      10"x10 10" x 10 VC's      Sidelying shoulder ER    1# 2x10  1# 2x 10    2x10 VCs     Pt education  PT POC, shoulder anatomy/pathophysiology           Ther Activity                                       Gait Training                                       Modalities

## 2023-11-27 ENCOUNTER — APPOINTMENT (OUTPATIENT)
Dept: PHYSICAL THERAPY | Facility: CLINIC | Age: 48
End: 2023-11-27
Payer: COMMERCIAL

## 2023-11-27 DIAGNOSIS — M67.912 TENDINOPATHY OF LEFT ROTATOR CUFF: Primary | ICD-10-CM

## 2023-11-28 ENCOUNTER — TELEPHONE (OUTPATIENT)
Dept: FAMILY MEDICINE CLINIC | Facility: CLINIC | Age: 48
End: 2023-11-28

## 2023-11-28 ENCOUNTER — APPOINTMENT (OUTPATIENT)
Dept: PHYSICAL THERAPY | Facility: CLINIC | Age: 48
End: 2023-11-28
Payer: COMMERCIAL

## 2023-11-28 DIAGNOSIS — M67.912 TENDINOPATHY OF LEFT ROTATOR CUFF: Primary | ICD-10-CM

## 2023-11-28 DIAGNOSIS — G43.809 OTHER MIGRAINE WITHOUT STATUS MIGRAINOSUS, NOT INTRACTABLE: ICD-10-CM

## 2023-11-28 RX ORDER — AMITRIPTYLINE HYDROCHLORIDE 25 MG/1
25 TABLET, FILM COATED ORAL
Qty: 90 TABLET | Refills: 2 | Status: SHIPPED | OUTPATIENT
Start: 2023-11-28

## 2023-11-28 NOTE — TELEPHONE ENCOUNTER
Patient called asking if you would be able to give her a referral to ortho for her shoulder? Has been going to PT for a month now and seems to be getting worse.

## 2023-11-30 ENCOUNTER — APPOINTMENT (OUTPATIENT)
Dept: PHYSICAL THERAPY | Facility: CLINIC | Age: 48
End: 2023-11-30
Payer: COMMERCIAL

## 2023-12-03 ENCOUNTER — OFFICE VISIT (OUTPATIENT)
Dept: URGENT CARE | Facility: CLINIC | Age: 48
End: 2023-12-03
Payer: COMMERCIAL

## 2023-12-03 VITALS
BODY MASS INDEX: 35.39 KG/M2 | SYSTOLIC BLOOD PRESSURE: 104 MMHG | RESPIRATION RATE: 16 BRPM | DIASTOLIC BLOOD PRESSURE: 68 MMHG | HEART RATE: 82 BPM | TEMPERATURE: 97.8 F | WEIGHT: 190.38 LBS | OXYGEN SATURATION: 98 %

## 2023-12-03 DIAGNOSIS — H00.021 HORDEOLUM INTERNUM OF RIGHT UPPER EYELID: Primary | ICD-10-CM

## 2023-12-03 PROCEDURE — G0382 LEV 3 HOSP TYPE B ED VISIT: HCPCS | Performed by: ORTHOPAEDIC SURGERY

## 2023-12-03 RX ORDER — OFLOXACIN 3 MG/ML
1 SOLUTION/ DROPS OPHTHALMIC 4 TIMES DAILY
Qty: 5 ML | Refills: 0 | Status: SHIPPED | OUTPATIENT
Start: 2023-12-03 | End: 2023-12-10

## 2023-12-03 NOTE — PROGRESS NOTES
[facility]        NAME: Girma Harding is a 57 y.o. female  : 1975    MRN: [id number]  DATE: December 3, 2023  TIME: 11:03 AM    Assessment and Plan   Hordeolum internum of right upper eyelid [H00.021]  1. Hordeolum internum of right upper eyelid  ofloxacin (OCUFLOX) 0.3 % ophthalmic solution            Patient Instructions     Patient Instructions   Take antibiotics as directed. Warm compresses. OTC NSAIDs as directed       Stye   WHAT YOU NEED TO KNOW:   A stye is a lump on the edge or inside of your eyelid caused by an infection. A stye can form on your upper or lower eyelid. It usually goes away in 2 to 4 days. DISCHARGE INSTRUCTIONS:   Medicines:   Antibiotic medicine: This is given as an ointment to put into your eye. It is used to fight an infection caused by bacteria. Use as directed. Take your medicine as directed. Contact your healthcare provider if you think your medicine is not helping or if you have side effects. Tell him or her if you are allergic to any medicine. Keep a list of the medicines, vitamins, and herbs you take. Include the amounts, and when and why you take them. Bring the list or the pill bottles to follow-up visits. Carry your medicine list with you in case of an emergency. Follow up with your doctor as directed:  Write down your questions so you remember to ask them during your visits. Self-care:   Use warm compresses: This will help decrease swelling and pain. Wet a clean washcloth with warm water and place it on your eye for 10 to 15 minutes, 3 to 4 times each day or as directed. Keep your hands away from your eye: This helps to prevent the spread of infection to other parts of the eye. Wash your hands often with soap and dry with a clean towel. Do not squeeze the stye. Do not use eye makeup:  Do not wear eye makeup while you have a stye. Eye makeup may carry bacteria and cause another stye.  Throw away eye makeup and brushes used to apply the makeup. Use new eye makeup after the stye has gone away. Do not share eye makeup with others. Prevent another stye:  Wash your face and clean your eyelashes every day. Remove eye makeup with makeup remover. This helps to completely remove eye makeup without heavy rubbing. Contact your healthcare provider if:   You have redness and discharge around your eye, and your eye pain is getting worse. Your vision changes. The stye has not gone away within 7 days. The stye comes back within a short period of time after treatment. You have questions or concerns about your condition or care. © Copyright [employer] 2022 Information is for End User's use only and may not be sold, redistributed or otherwise used for commercial purposes. All illustrations and images included in CareNotes® are the copyrighted property of [employer] or [employer]   The above information is an  only. It is not intended as medical advice for individual conditions or treatments. Talk to your doctor, nurse or pharmacist before following any medical regimen to see if it is safe and effective for you. Follow up with PCP in 3-5 days. Proceed to  ER if symptoms worsen. Chief Complaint     Chief Complaint   Patient presents with   • Eye Problem     Right eye swelling at eyelid. Started 2 days ago. Pt states she rubbed eye and felt pain, yesterday swelling started, discharge, tearing. Redness note to eye. No irritation to sclera. Using warm compresses with no relief. Not affecting vision. No fever or chills         History of Present Illness       Pt is a 57 y.o. female c/o right eye swelling and pain on upper eyelid. Pt states it started 2 days ago after having rubbed her eyes. Pt has not worn contacts for the past week. Pt states she has been having yellow discharge with tearing and redness on her eye. Has been using warm compresses with no relief.  Denies visual disturbances, fever or chills. Review of Systems   Review of Systems   Constitutional:  Negative for fever. HENT:  Negative for congestion, ear pain and sore throat. Eyes:  Positive for pain, discharge and redness. Negative for photophobia, itching and visual disturbance. Respiratory:  Negative for cough and wheezing. Cardiovascular:  Negative for chest pain. Gastrointestinal:  Negative for abdominal pain, diarrhea, nausea and vomiting. Genitourinary:  Negative for difficulty urinating. Musculoskeletal:  Negative for back pain and myalgias. Skin:  Negative for pallor and rash. Allergic/Immunologic: Negative for environmental allergies. Neurological:  Negative for dizziness and headaches. Hematological:  Negative for adenopathy. Psychiatric/Behavioral:  Negative for confusion.           Current Medications       Current Outpatient Medications:   •  amitriptyline (ELAVIL) 25 mg tablet, Take 1 tablet (25 mg total) by mouth daily at bedtime, Disp: 90 tablet, Rfl: 2  •  diclofenac sodium (VOLTAREN) 50 mg EC tablet, Take 1 tablet (50 mg total) by mouth 2 (two) times a day, Disp: 60 tablet, Rfl: 1  •  famotidine (PEPCID) 20 mg tablet, Take 1 tablet (20 mg total) by mouth 2 (two) times a day, Disp: 180 tablet, Rfl: 2  •  fluticasone (FLONASE) 50 mcg/act nasal spray, PUT 1 SPRAY IN EACH NOSTRIL ONCE DAILY *PLAN LMTS, Disp: 48 mL, Rfl: 1  •  ofloxacin (OCUFLOX) 0.3 % ophthalmic solution, Administer 1 drop to the right eye 4 (four) times a day for 7 days, Disp: 5 mL, Rfl: 0  •  butalbital-acetaminophen-caffeine (FIORICET,ESGIC) -40 mg per tablet, Take 1 tablet by mouth every 4 (four) hours as needed for headaches (Patient not taking: Reported on 8/22/2023), Disp: 30 tablet, Rfl: 1  •  cyclobenzaprine (FLEXERIL) 5 mg tablet, Take 1 tablet (5 mg total) by mouth 3 (three) times a day as needed for muscle spasms (Patient not taking: Reported on 12/3/2023), Disp: 20 tablet, Rfl: 0    Current Allergies     Allergies as of 12/03/2023   • (No Known Allergies)            The following portions of the patient's history were reviewed and updated as appropriate: allergies, current medications, past family history, past medical history, past social history, past surgical history and problem list.     Past Medical History:   Diagnosis Date   • Acid reflux disease    • Allergic rhinitis, seasonal    • Asthma    • Hiatal hernia    • Migraine    • Ulcer of gastroesophageal junction        Past Surgical History:   Procedure Laterality Date   • MD EGD TRANSORAL BIOPSY SINGLE/MULTIPLE N/A 2/5/2019    Procedure: ESOPHAGOGASTRODUODENOSCOPY (EGD); Surgeon: Scott Barron DO;  Location: [facility]; Service: Gastroenterology   • WISDOM TOOTH EXTRACTION         Family History   Problem Relation Age of Onset   • Anxiety disorder Mother    • Hyperlipidemia Mother    • Anxiety disorder Sister    • Anxiety disorder Daughter    • Diabetes Maternal Grandmother    • No Known Problems Maternal Grandfather    • No Known Problems Paternal Grandmother    • No Known Problems Paternal Grandfather    • No Known Problems Brother    • No Known Problems Brother    • Anxiety disorder Brother    • No Known Problems Brother    • Asthma Son    • No Known Problems Son    • No Known Problems Maternal Aunt    • No Known Problems Maternal Aunt    • No Known Problems Maternal Aunt    • No Known Problems Maternal Aunt    • No Known Problems Maternal Uncle    • Breast cancer Neg Hx    • Colon cancer Neg Hx    • Ovarian cancer Neg Hx    • Uterine cancer Neg Hx    • Cervical cancer Neg Hx          Medications have been verified. Objective   /68   Pulse 82   Temp 97.8 °F (36.6 °C)   Resp 16   Wt 86.4 kg (190 lb 6 oz)   SpO2 98%   BMI 35.39 kg/m²        Physical Exam     Physical Exam  Vitals and nursing note reviewed. Constitutional:       General: She is not in acute distress. Appearance: She is well-developed and well-groomed. She is obese.  She is not ill-appearing. HENT:      Head: Normocephalic. Left Ear: Tympanic membrane normal.      Nose: Nose normal. No congestion or rhinorrhea. Mouth/Throat:      Lips: Pink. Eyes:      General: Lids are normal. No scleral icterus. Right eye: Discharge present. Left eye: No discharge. Extraocular Movements: Extraocular movements intact. Pupils: Pupils are equal, round, and reactive to light. Comments: Erythematous with internal stye noted on upper eye lid, teary eye   Cardiovascular:      Rate and Rhythm: Normal rate and regular rhythm. Heart sounds: S1 normal and S2 normal.   Pulmonary:      Effort: Pulmonary effort is normal.      Breath sounds: Normal breath sounds and air entry. Abdominal:      General: Abdomen is flat. Palpations: There is no hepatomegaly or splenomegaly. Musculoskeletal:      Cervical back: Normal range of motion. Lymphadenopathy:      Cervical: No cervical adenopathy. Skin:     General: Skin is warm and dry. Neurological:      Mental Status: She is alert. Psychiatric:         Speech: Speech normal.         Behavior: Behavior is cooperative.

## 2023-12-03 NOTE — PATIENT INSTRUCTIONS
Take antibiotics as directed. Warm compresses. OTC NSAIDs as directed       Stye   WHAT YOU NEED TO KNOW:   A stye is a lump on the edge or inside of your eyelid caused by an infection. A stye can form on your upper or lower eyelid. It usually goes away in 2 to 4 days. DISCHARGE INSTRUCTIONS:   Medicines:   Antibiotic medicine: This is given as an ointment to put into your eye. It is used to fight an infection caused by bacteria. Use as directed. Take your medicine as directed. Contact your healthcare provider if you think your medicine is not helping or if you have side effects. Tell him or her if you are allergic to any medicine. Keep a list of the medicines, vitamins, and herbs you take. Include the amounts, and when and why you take them. Bring the list or the pill bottles to follow-up visits. Carry your medicine list with you in case of an emergency. Follow up with your doctor as directed:  Write down your questions so you remember to ask them during your visits. Self-care:   Use warm compresses: This will help decrease swelling and pain. Wet a clean washcloth with warm water and place it on your eye for 10 to 15 minutes, 3 to 4 times each day or as directed. Keep your hands away from your eye: This helps to prevent the spread of infection to other parts of the eye. Wash your hands often with soap and dry with a clean towel. Do not squeeze the stye. Do not use eye makeup:  Do not wear eye makeup while you have a stye. Eye makeup may carry bacteria and cause another stye. Throw away eye makeup and brushes used to apply the makeup. Use new eye makeup after the stye has gone away. Do not share eye makeup with others. Prevent another stye:  Wash your face and clean your eyelashes every day. Remove eye makeup with makeup remover. This helps to completely remove eye makeup without heavy rubbing.     Contact your healthcare provider if:   You have redness and discharge around your eye, and your eye pain is getting worse. Your vision changes. The stye has not gone away within 7 days. The stye comes back within a short period of time after treatment. You have questions or concerns about your condition or care. © Copyright [address] 2022 Information is for End User's use only and may not be sold, redistributed or otherwise used for commercial purposes. All illustrations and images included in CareNotes® are the copyrighted property of [employer] or [employer]  The above information is an  only. It is not intended as medical advice for individual conditions or treatments. Talk to your doctor, nurse or pharmacist before following any medical regimen to see if it is safe and effective for you.

## 2023-12-19 ENCOUNTER — APPOINTMENT (OUTPATIENT)
Dept: RADIOLOGY | Facility: CLINIC | Age: 48
End: 2023-12-19
Payer: COMMERCIAL

## 2023-12-19 ENCOUNTER — OFFICE VISIT (OUTPATIENT)
Dept: OBGYN CLINIC | Facility: CLINIC | Age: 48
End: 2023-12-19
Payer: COMMERCIAL

## 2023-12-19 VITALS
HEIGHT: 62 IN | DIASTOLIC BLOOD PRESSURE: 79 MMHG | WEIGHT: 190 LBS | HEART RATE: 80 BPM | SYSTOLIC BLOOD PRESSURE: 119 MMHG | BODY MASS INDEX: 34.96 KG/M2

## 2023-12-19 DIAGNOSIS — M67.912 TENDINOPATHY OF LEFT ROTATOR CUFF: ICD-10-CM

## 2023-12-19 DIAGNOSIS — M25.512 LEFT SHOULDER PAIN, UNSPECIFIED CHRONICITY: ICD-10-CM

## 2023-12-19 DIAGNOSIS — M75.02 ADHESIVE CAPSULITIS OF LEFT SHOULDER: Primary | ICD-10-CM

## 2023-12-19 PROCEDURE — 99214 OFFICE O/P EST MOD 30 MIN: CPT | Performed by: ORTHOPAEDIC SURGERY

## 2023-12-19 PROCEDURE — 73030 X-RAY EXAM OF SHOULDER: CPT

## 2023-12-19 RX ORDER — METHYLPREDNISOLONE 4 MG/1
TABLET ORAL
Qty: 1 EACH | Refills: 0 | Status: SHIPPED | OUTPATIENT
Start: 2023-12-19

## 2023-12-19 NOTE — PROGRESS NOTES
Patient Name:  Zaria Duke  MRN:  [id number]    Assessment & Plan     1. Adhesive capsulitis of left shoulder  -     methylPREDNISolone 4 MG tablet therapy pack; Use as directed on package  -     Ambulatory Referral to Physical Therapy; Future    2. Left shoulder pain, unspecified chronicity  -     XR shoulder 2+ vw left; Future; Expected date: 12/19/2023    3. Tendinopathy of left rotator cuff  -     Ambulatory referral to Orthopedic Surgery      Left shoulder adhesive capsulitis  X-rays reviewed in office today with patient  In depth conversation had with patient regarding nonoperative management of adhesive capsulitis with outpatient PT to focus on passive and active range of motion while avoiding strengthening at this time. New script placed today.   Also discussed possible Left glenohumeral CSI vs oral steroids. In light of patient's shoulder pain with new onset of forearm pain, will move forward with oral steroids to decrease inflammation and assist with increasing ROM with therapy. If patient's pain persists, can consider CSI in the future. Educated patient about side effects of oral steroids, advised to take with food and water, and avoid NSAIDs with steroids.  Discussed home exercises including table slides and wall walks  If patient's range of motion improves, but pain persists, can also consider further imaging to rule out rotator cuff pathology.   Patient will follow up in 3 months for reevaluation of Left shoulder. She will call the office if symptoms persist or worsen and consider CSI at that time.     Chief Complaint     Left shoulder/arm pain    History of the Present Illness     Zaria Duke is a RHD 48 y.o. female with Left shoulder pain after lifting bags from the back of her car from her front seat. Patient admits to hearing a pop in the shoulder during this injury and noted significant pain. Today, she locates pain to the lateral aspect of the shoulder, worse with increased function and  "reaching. She cannot reach into the back seat of her car secondary to stiffness and pain. Recently she has noted some throbbing at the lateral aspect of the elbow and forearm. She was attending outpatient PT and felt as it made her worse. She was using weights during most of her appointments and did not enjoy graston manual therapy. She works from home at a computer.    Review of Systems     Review of Systems   Constitutional:  Negative for chills and fever.   HENT:  Negative for congestion.    Respiratory:  Negative for cough, chest tightness and shortness of breath.    Cardiovascular:  Negative for chest pain and palpitations.   Gastrointestinal:  Negative for abdominal pain.   Endocrine: Negative for cold intolerance and heat intolerance.   Neurological:  Negative for syncope.   Psychiatric/Behavioral:  Negative for confusion.        Physical Exam     /79   Pulse 80   Ht 5' 1.5\" (1.562 m)   Wt 86.2 kg (190 lb)   BMI 35.32 kg/m²     Left Shoulder:   Active range of motion   115 degrees forward flexion  100 degrees abduction  40 degrees external rotation   Greater trochanter internal rotation    Passive range of motion   110 degrees of forward flexion   40 degrees external rotation    There is no tenderness present over the shoulder.   Supraspinatus testing 5/5  Infraspinatus testing 5/5  Subscapularis testing 5/5  [place] test is positive  [place]'s test is negative    Speed's test is Positive  The patient is neurovascularly intact distally in the extremity.    Left elbow  Range of motion 0 to approximately 140 degrees  No pain with resisted wrist extension/flexion, supination/pronation  Full composite fist  No tenderness appreciated  Neurovascularly intact distally      Eyes:  Anicteric sclerae.  Neck:  Supple.  Lungs:  Normal respiratory effort.  Cardiovascular:  Capillary refill is less than 2 seconds.  Skin:  Intact without erythema.  Neurologic:  Sensation grossly intact to light " touch.  Psychiatric:  Mood and affect are appropriate.    Data Review     I have personally reviewed pertinent films in PACS, and my interpretation follows:    X-rays taken 12/19/2023 of Left shoulder independently reviewed and demonstrate no acute fracture, dislocation or significant degenerative changes.     Past Medical History:   Diagnosis Date    Acid reflux disease     Allergic rhinitis, seasonal     Asthma     Hiatal hernia     Migraine     Ulcer of gastroesophageal junction        Past Surgical History:   Procedure Laterality Date    FL EGD TRANSORAL BIOPSY SINGLE/MULTIPLE N/A 2/5/2019    Procedure: ESOPHAGOGASTRODUODENOSCOPY (EGD);  Surgeon: Ruben Stone DO;  Location: [facility];  Service: Gastroenterology    WISDOM TOOTH EXTRACTION         No Known Allergies    Current Outpatient Medications on File Prior to Visit   Medication Sig Dispense Refill    amitriptyline (ELAVIL) 25 mg tablet Take 1 tablet (25 mg total) by mouth daily at bedtime 90 tablet 2    famotidine (PEPCID) 20 mg tablet Take 1 tablet (20 mg total) by mouth 2 (two) times a day 180 tablet 2    fluticasone (FLONASE) 50 mcg/act nasal spray PUT 1 SPRAY IN EACH NOSTRIL ONCE DAILY *PLAN LMTS 48 mL 1    butalbital-acetaminophen-caffeine (FIORICET,ESGIC) -40 mg per tablet Take 1 tablet by mouth every 4 (four) hours as needed for headaches (Patient not taking: Reported on 8/22/2023) 30 tablet 1    cyclobenzaprine (FLEXERIL) 5 mg tablet Take 1 tablet (5 mg total) by mouth 3 (three) times a day as needed for muscle spasms (Patient not taking: Reported on 12/3/2023) 20 tablet 0    diclofenac sodium (VOLTAREN) 50 mg EC tablet Take 1 tablet (50 mg total) by mouth 2 (two) times a day 60 tablet 1     No current facility-administered medications on file prior to visit.       Social History     Tobacco Use    Smoking status: Never     Passive exposure: Never    Smokeless tobacco: Never   Vaping Use    Vaping status: Never Used   Substance Use Topics     Alcohol use: Not Currently     Comment: social    Drug use: No       Family History   Problem Relation Age of Onset    Anxiety disorder Mother     Hyperlipidemia Mother     Anxiety disorder Sister     Anxiety disorder Daughter     Diabetes Maternal Grandmother     No Known Problems Maternal Grandfather     No Known Problems Paternal Grandmother     No Known Problems Paternal Grandfather     No Known Problems Brother     No Known Problems Brother     Anxiety disorder Brother     No Known Problems Brother     Asthma Son     No Known Problems Son     No Known Problems Maternal Aunt     No Known Problems Maternal Aunt     No Known Problems Maternal Aunt     No Known Problems Maternal Aunt     No Known Problems Maternal Uncle     Breast cancer Neg Hx     Colon cancer Neg Hx     Ovarian cancer Neg Hx     Uterine cancer Neg Hx     Cervical cancer Neg Hx              Procedures Performed     Procedures  None       Adal Nath DO

## 2024-01-15 ENCOUNTER — EVALUATION (OUTPATIENT)
Dept: PHYSICAL THERAPY | Facility: CLINIC | Age: 49
End: 2024-01-15
Payer: COMMERCIAL

## 2024-01-15 DIAGNOSIS — M75.02 ADHESIVE CAPSULITIS OF LEFT SHOULDER: Primary | ICD-10-CM

## 2024-01-15 PROCEDURE — 97110 THERAPEUTIC EXERCISES: CPT

## 2024-01-15 PROCEDURE — 97161 PT EVAL LOW COMPLEX 20 MIN: CPT

## 2024-01-15 NOTE — PROGRESS NOTES
"PT Evaluation     Today's date: 1/15/2024  Patient name: Zaria Duke  : 1975  MRN: [id number]  Referring provider: Freda Treadwell PA-C  Dx:   Encounter Diagnosis     ICD-10-CM    1. Adhesive capsulitis of left shoulder  M75.02 Ambulatory Referral to Physical Therapy          Start Time:   Stop Time:   Total time in clinic (min): 40 minutes    Assessment  Assessment details: Zaria Duke is a 48 y.o. female presenting to physical therapy for an initial evaluation with a MD referral of adhesive capsulitis of left shoulder. The patient demonstrates decreased and painful active and passive shoulder ROM, decreased shoulder strength, and glenohumeral capsular hypomobility.  These deficits have limited the patient's ability to complete ADLs, household lifting and cleaning, vocational responsibilities, and recreational activities. This patient would benefit from OP PT services to address their impairments and functional limitations to maximize functional mobility. The patient was educated on anatomy of the shoulder joint and pathophysiology of adhesive capsulitis. She was provided a HEP and demonstrated/verbalized understanding all education.  Impairments: abnormal or restricted ROM, activity intolerance, impaired physical strength, lacks appropriate home exercise program and pain with function    Symptom irritability: moderateUnderstanding of Dx/Px/POC: excellent   Prognosis: good    Goals  STG to be achieved in 4 weeks:   The patient will report a decrease in left shoulder \"at worst\" subjective pain rating score to at least 6/10 to allow for improved tolerance for functional activities.   The patient will increase left shoulder abduction AROM to at least 110 degrees to allow for improved functional mobility.   The patient will increase left shoulder flexion AROM to at least 120 degrees to allow for improved functional mobility.     LTG to be achieved by DC:   The patient will be independent in " "comprehensive HEP.   The patient will report no pain with usual activities.   The patient will improve all left shoulder AROM to WFL to allow for improved functional mobility.   The patient will increase all left shoulder MMT score to WFL to allow for improved functional mobility.   The patient will be able to complete all household lifting (laundry basket, dishes, closets, etc) without pain or difficulty.  The patient will be able to complete all ADLs without pain or difficulty.       Plan  Patient would benefit from: skilled physical therapy  Planned modality interventions: thermotherapy: hydrocollator packs, TENS and cryotherapy  Planned therapy interventions: manual therapy, joint mobilization, neuromuscular re-education, patient education, flexibility, strengthening, stretching, therapeutic activities, therapeutic exercise and home exercise program  Frequency: 2x week  Duration in weeks: 12  Plan of Care beginning date: 1/15/2024  Plan of Care expiration date: 4/8/2024  Treatment plan discussed with: patient        Subjective Evaluation    History of Present Illness  Mechanism of injury: Zaria presents to therapy with a chief complaint of left shoulder pain ongoing since the end of September at which time she reached into the back seat of the car. She did have a bout of OP PT after the injury, however her shoulder continued to be very painful. She was then referred for orthopedics for a consultation due to continued pain. She saw orthopedics on 12/19/23 and was referred for imaging. Xrays showed \"no acute fracture or dislocation. Mild osteoarthritis acromioclavicular joint. No lytic or blastic osseous lesion\". She was advised on nonoperative management of adhesive capsulitis with focus on passive and active range of motion while avoiding strengthening at this time. She was also advised on possible CSI should her symptoms continue or progress. She was also prescribed a course of oral steroids to address her " pain. She says that this did help with her pain mildly to sleep on that side and move it a little more. However, since that steroid pack has ended, the pain returned.     She reports continued pain with lifting household object such as her laundry bag, grocery bags, dishes/cups into her kitchen cabinets or with reaching to her closet shelves for clothing, reaching for her showerhead, brushing/styling her hair, or putting on/taking off her bra.           Patient Goals  Patient goals for therapy: decreased pain, increased strength, independence with ADLs/IADLs and increased motion  Patient goal: to be back to normal  Pain  Current pain ratin  At best pain ratin  At worst pain ratin  Location: left shoulder, radiating down left arm to elbow  Quality: sharp, dull ache and burning  Relieving factors: change in position, medications and rest  Aggravating factors: overhead activity and lifting  Progression: no change    Social Support  Lives with: spouse and young children    Employment status: working  Hand dominance: right      Diagnostic Tests  X-ray: normal  Treatments  Previous treatment: medication and physical therapy  Current treatment: physical therapy        Objective     Palpation   Left   No palpable tenderness to the biceps, infraspinatus, supraspinatus, teres major and teres minor.   Hypertonic in the upper trapezius.   Tenderness of the levator scapulae and upper trapezius.     Tenderness     Left Shoulder   Tenderness in the AC joint, biceps tendon (proximal) and supraspinatus tendon. No tenderness in the bicipital groove, infraspinatus tendon, lateral scapula, medial scapula and scapular spine.     Active Range of Motion   Left Shoulder   Flexion: 100 (UT compensation) degrees with pain  Abduction: 74 (UT compensation) degrees with pain  External rotation BTH: C5 (UT compensation) with pain  Internal rotation BTB: Active internal rotation behind the back: L greater trochanter. with  "pain    Right Shoulder   Flexion: WFL  Abduction: WFL  External rotation BTH: T3   Internal rotation BTB: T6     Passive Range of Motion   Left Shoulder   Flexion: 120 degrees with pain  Abduction: 92 degrees with pain  External rotation 45°: 38 degrees with pain  Internal rotation 45°: 34 degrees with pain    Right Shoulder   Normal passive range of motion    Additional Passive Range of Motion Details  Capsular end feel with all L shoulder PROM     Joint Play   Left Shoulder  Hypomobile in the posterior capsule and inferior capsule.    Strength/Myotome Testing     Left Shoulder     Planes of Motion   Flexion: 4- (pain)   Abduction: 3+ (pain)   External rotation at 0°: 3+   Internal rotation at 0°: 4-     Right Shoulder     Planes of Motion   Flexion: 5   Abduction: 5   External rotation at 0°: 5   Internal rotation at 0°: 5              Precautions: Standard  Per Ortho 12/19/23: \"Left shoulder adhesive capsulitis. Focus on passive and active range of motion, pulleys. Postural exercises. Avoid strengthening at this time. Modalities as indicated\"  Access Code: [email or web address]  POC expires Unit limit Auth  expiration date PT/OT + Visit Limit?   4/8/24 N/A 5/31/24 120 per plan year, 18 used                 Visit/Unit Tracking  AUTH Status:  Date 1/15              120 per plan year Used 19               Remaining  101                   Manuals 1/15            L shoulder PROM              L glenohumeral post/inf mobs                                       Neuro Re-Ed                                                                                                        Ther Ex             UBE fwd and retro for shoulder ROM and postural muscles             Pulleys flex             Finger ladder flex, scapt              Table slides flex, scapt HEP            Seated shoulder ER stretch HEP            Sleeper stretch sidelying vs standing at wall             Supine cane flex AAROM             Supine cane ER AAROM             L UT " and levator stretches                          Pt education PT POC, HEP, anatomy, pathophysiology            Ther Activity                                       Gait Training                                       Modalities

## 2024-01-19 NOTE — PROGRESS NOTES
"Daily Note     Today's date: 2024  Patient name: Zaria Duke  : 1975  MRN: [id number]  Referring provider: Freda Treadwell PA-C  Dx:   Encounter Diagnosis     ICD-10-CM    1. Adhesive capsulitis of left shoulder  M75.02                      Subjective: Patient reports that table slides in scaption are quite painful and difficult for her to do.       Objective: See treatment diary below      Assessment: Tolerated treatment well. Patient with much improved tolerance, ROM, and less guarding during shoulder PROM with VC's for deep breaths throughout the stretch. Patient demonstrated fatigue post treatment, exhibited good technique with therapeutic exercises, and would benefit from continued PT      Plan: Continue per plan of care.      Precautions: Standard  Per Ortho 23: \"Left shoulder adhesive capsulitis. Focus on passive and active range of motion, pulleys. Postural exercises. Avoid strengthening at this time. Modalities as indicated\"  Access Code: [email or web address]  POC expires Unit limit Auth  expiration date PT/OT + Visit Limit?   24 N/A 24 120 per plan year, 18 used                 Visit/Unit Tracking  AUTH Status:  Date 1/15 1/22             120 per plan year Used 19 20              Remaining  101 100                  Manuals 1/15 1/22           L shoulder PROM   BE           L glenohumeral post/inf mobs  NV                                     Neuro Re-Ed                                                                                                        Ther Ex             UBE fwd and retro for shoulder ROM and postural muscles  3'/3'           Pulleys flex  3 min           Finger ladder flex, scapt   5\"x10 flex            Table slides flex, scapt HEP            Seated shoulder ER stretch HEP            Sleeper stretch sidelying vs standing at wall             Supine cane flex AAROM  5\" 2x10           Supine cane ER AAROM  5\" x10           L UT and levator stretches  3x30\" ea         "                Pt education PT POC, HEP, anatomy, pathophysiology            Ther Activity                                       Gait Training                                       Modalities

## 2024-01-22 ENCOUNTER — OFFICE VISIT (OUTPATIENT)
Dept: PHYSICAL THERAPY | Facility: CLINIC | Age: 49
End: 2024-01-22
Payer: COMMERCIAL

## 2024-01-22 DIAGNOSIS — M75.02 ADHESIVE CAPSULITIS OF LEFT SHOULDER: Primary | ICD-10-CM

## 2024-01-22 PROCEDURE — 97140 MANUAL THERAPY 1/> REGIONS: CPT

## 2024-01-22 PROCEDURE — 97110 THERAPEUTIC EXERCISES: CPT

## 2024-01-25 ENCOUNTER — OFFICE VISIT (OUTPATIENT)
Dept: PHYSICAL THERAPY | Facility: CLINIC | Age: 49
End: 2024-01-25
Payer: COMMERCIAL

## 2024-01-25 DIAGNOSIS — M75.02 ADHESIVE CAPSULITIS OF LEFT SHOULDER: Primary | ICD-10-CM

## 2024-01-25 PROCEDURE — 97140 MANUAL THERAPY 1/> REGIONS: CPT

## 2024-01-25 PROCEDURE — 97110 THERAPEUTIC EXERCISES: CPT

## 2024-01-25 NOTE — PROGRESS NOTES
"Daily Note     Today's date: 2024  Patient name: Zaria Duke  : 1975  MRN: [id number]  Referring provider: Freda Treadwell PA-C  Dx:   Encounter Diagnosis     ICD-10-CM    1. Adhesive capsulitis of left shoulder  M75.02           Start Time: 1800  Stop Time: 184  Total time in clinic (min): 42 minutes    Subjective: Pt noted no pain in L shoulder. Still having some increase in stiffness.       Objective: See treatment diary below      Assessment: Challenged with supine ER w/ cane this visit  due to increase in stiffness. Tolerated treatment well. Did present with some muscle guarding with PROM this visit and required verbal cues for relaxation. She noted the most challenge with IR and ER this visit.  Patient exhibited good technique with therapeutic exercises and would benefit from continued PT. S/p treatment session, noted some increase in soreness.       Plan: Continue per plan of care.      Precautions: Standard  Per Ortho 23: \"Left shoulder adhesive capsulitis. Focus on passive and active range of motion, pulleys. Postural exercises. Avoid strengthening at this time. Modalities as indicated\"  Access Code: [email or web address]  POC expires Unit limit Auth  expiration date PT/OT + Visit Limit?   24 N/A 24 120 per plan year, 18 used                 Visit/Unit Tracking  AUTH Status:  Date 1/15 1/22 1/25            120 per plan year Used 19 20 21             Remaining  101 100 99                 Manuals 1/15 1/22 1/25          L shoulder PROM   BE SC          L glenohumeral post/inf mobs  NV NV                                    Neuro Re-Ed                                                                                                        Ther Ex             UBE fwd and retro for shoulder ROM and postural muscles  3'/3' 3'/3'          Pulleys flex  3 min 3 min           Finger ladder flex, scapt   5\"x10 flex  5\" 10x flexion          Table slides flex, scapt HEP            Seated shoulder ER " "stretch HEP            Sleeper stretch sidelying vs standing at wall             Supine cane flex AAROM  5\" 2x10 5\" 2x 10           Supine cane ER AAROM  5\" x10 5\" 10x          L UT and levator stretches  3x30\" ea 3x 30\"                        Pt education PT POC, HEP, anatomy, pathophysiology            Ther Activity                                       Gait Training                                       Modalities                                              "

## 2024-01-29 ENCOUNTER — APPOINTMENT (OUTPATIENT)
Dept: PHYSICAL THERAPY | Facility: CLINIC | Age: 49
End: 2024-01-29
Payer: COMMERCIAL

## 2024-02-01 ENCOUNTER — OFFICE VISIT (OUTPATIENT)
Dept: PHYSICAL THERAPY | Facility: CLINIC | Age: 49
End: 2024-02-01
Payer: COMMERCIAL

## 2024-02-01 DIAGNOSIS — M75.02 ADHESIVE CAPSULITIS OF LEFT SHOULDER: Primary | ICD-10-CM

## 2024-02-01 PROCEDURE — 97110 THERAPEUTIC EXERCISES: CPT

## 2024-02-01 PROCEDURE — 97140 MANUAL THERAPY 1/> REGIONS: CPT

## 2024-02-01 NOTE — PROGRESS NOTES
"Daily Note     Today's date: 2024  Patient name: Zaria Duke  : 1975  MRN: [id number]  Referring provider: Freda Treadwell PA-C  Dx:   Encounter Diagnosis     ICD-10-CM    1. Adhesive capsulitis of left shoulder  M75.02                      Subjective: Patient reports that while her shoulder is still painful, she does think that she is able to lift her arm up higher.       Objective: See treatment diary below      Assessment: Tolerated treatment well. Added standing cane AAROM of the left shoulder in scaption and flexion planes. She did have increased discomfort in the shoulder during. Patient demonstrated fatigue post treatment, exhibited good technique with therapeutic exercises, and would benefit from continued PT      Plan: Continue per plan of care.      Precautions: Standard  Per Ortho 23: \"Left shoulder adhesive capsulitis. Focus on passive and active range of motion, pulleys. Postural exercises. Avoid strengthening at this time. Modalities as indicated\"  Access Code: [email or web address]  POC expires Unit limit Auth  expiration date PT/OT + Visit Limit?   24 N/A 24 120 per plan year, 18 used                 Visit/Unit Tracking  AUTH Status:  Date 1/15 1/22 1/25 2/1           120 per plan year Used 19 20 21 22            Remaining  101 100 99 98                Manuals 1/15 1/22 1/25 2/1         L shoulder PROM   BE SC BE         L glenohumeral post/inf mobs  NV NV BE Grade 3                                    Neuro Re-Ed                                                                                                        Ther Ex             UBE fwd and retro for shoulder ROM and postural muscles  3'/3' 3'/3' 3'/3'         Pulleys flex  3 min 3 min  3 min         Finger ladder flex, scapt   5\"x10 flex  5\" 10x flexion          Table slides flex, scapt HEP            Seated shoulder ER stretch HEP            Sleeper stretch sidelying vs standing at wall    10\"x10  ER and   10\"x4 IR   on side " "         Supine cane flex AAROM  5\" 2x10 5\" 2x 10           Supine cane ER AAROM  5\" x10 5\" 10x          L UT and levator stretches  3x30\" ea 3x 30\"           Standing shoulder flex and scapt stretches with cane     5\" 2x10 ea         Pt education PT POC, HEP, anatomy, pathophysiology            Ther Activity                                       Gait Training                                       Modalities                                                "

## 2024-02-02 NOTE — PROGRESS NOTES
"Daily Note     Today's date: 2024  Patient name: Zaria Duke  : 1975  MRN: [id number]  Referring provider: Freda Treadwell PA-C  Dx:   Encounter Diagnosis     ICD-10-CM    1. Adhesive capsulitis of left shoulder  M75.02                      Subjective: ***      Objective: See treatment diary below      Assessment: Tolerated treatment {Tolerated treatment :}. Patient {assessment:}      Plan: {PLAN:}     Precautions: Standard  Per Ortho 23: \"Left shoulder adhesive capsulitis. Focus on passive and active range of motion, pulleys. Postural exercises. Avoid strengthening at this time. Modalities as indicated\"  Access Code: [email or web address]  POC expires Unit limit Auth  expiration date PT/OT + Visit Limit?   24 N/A 24 120 per plan year, 18 used                 Visit/Unit Tracking  AUTH Status:  Date 1/15 1/22 1/25 2/1           120 per plan year Used             Remaining  101 100 99 98                Manuals 1/15 1/22 1/25 2/1         L shoulder PROM   BE SC BE         L glenohumeral post/inf mobs  NV NV BE Grade 3                                    Neuro Re-Ed                                                                                                        Ther Ex             UBE fwd and retro for shoulder ROM and postural muscles  3'/3' 3'/3' 3'/3'         Pulleys flex  3 min 3 min  3 min         Finger ladder flex, scapt   5\"x10 flex  5\" 10x flexion          Table slides flex, scapt HEP            Seated shoulder ER stretch HEP            Sleeper stretch sidelying vs standing at wall    10\"x10  ER and   10\"x4 IR   on side          Supine cane flex AAROM  5\" 2x10 5\" 2x 10           Supine cane ER AAROM  5\" x10 5\" 10x          L UT and levator stretches  3x30\" ea 3x 30\"           Standing shoulder flex and scapt stretches with cane     5\" 2x10 ea         Pt education PT POC, HEP, anatomy, pathophysiology            Ther Activity                        "                Gait Training                                       Modalities

## 2024-02-05 ENCOUNTER — APPOINTMENT (OUTPATIENT)
Dept: PHYSICAL THERAPY | Facility: CLINIC | Age: 49
End: 2024-02-05
Payer: COMMERCIAL

## 2024-02-05 DIAGNOSIS — M75.02 ADHESIVE CAPSULITIS OF LEFT SHOULDER: Primary | ICD-10-CM

## 2024-02-08 ENCOUNTER — APPOINTMENT (OUTPATIENT)
Dept: PHYSICAL THERAPY | Facility: CLINIC | Age: 49
End: 2024-02-08
Payer: COMMERCIAL

## 2024-02-08 DIAGNOSIS — R11.0 NAUSEA: ICD-10-CM

## 2024-02-08 RX ORDER — FAMOTIDINE 20 MG/1
20 TABLET, FILM COATED ORAL 2 TIMES DAILY
Qty: 180 TABLET | Refills: 2 | Status: SHIPPED | OUTPATIENT
Start: 2024-02-08

## 2024-02-12 ENCOUNTER — OFFICE VISIT (OUTPATIENT)
Dept: PHYSICAL THERAPY | Facility: CLINIC | Age: 49
End: 2024-02-12
Payer: COMMERCIAL

## 2024-02-12 DIAGNOSIS — M75.02 ADHESIVE CAPSULITIS OF LEFT SHOULDER: Primary | ICD-10-CM

## 2024-02-12 PROCEDURE — 97110 THERAPEUTIC EXERCISES: CPT

## 2024-02-12 PROCEDURE — 97140 MANUAL THERAPY 1/> REGIONS: CPT

## 2024-02-12 NOTE — PROGRESS NOTES
"Daily Note     Today's date: 2024  Patient name: Zaria Duke  : 1975  MRN: [id number]  Referring provider: Freda Treadwell PA-C  Dx:   Encounter Diagnosis     ICD-10-CM    1. Adhesive capsulitis of left shoulder  M75.02           Start Time: 1750  Stop Time: 1828  Total time in clinic (min): 38 minutes    Subjective: pt noted that she has a lot of increase pain last week and noted that she needed to take a week off of therapy.   She did make an appointment for an injection in her L shoulder. 3/5     Objective: See treatment diary below      Assessment:  Continued with treatment session she noted some soreness in L shoulder while completing AAROM as well as PROM> in IR and ER of L shoulder verses all other exercises. Tolerated treatment well. Patient exhibited good technique with therapeutic exercises and would benefit from continued PT  Advised due to take a week off therapy that she may have some increase soreness/ DOMS.     Plan: Continue per plan of care.      Precautions: Standard  Per Ortho 23: \"Left shoulder adhesive capsulitis. Focus on passive and active range of motion, pulleys. Postural exercises. Avoid strengthening at this time. Modalities as indicated\"  Access Code: [email or web address]  POC expires Unit limit Auth  expiration date PT/OT + Visit Limit?   24 N/A 24 120 per plan year, 18 used                 Visit/Unit Tracking  AUTH Status:  Date 1/15 1/22 1/25 2/1 2/12          120 per plan year Used 19 20 21 22 23           Remaining  101 100 99 98 97               Manuals 1/15 1/22 1/25 2/1 2/12        L shoulder PROM   BE SC BE SC        L glenohumeral post/inf mobs  NV NV BE Grade 3                                    Neuro Re-Ed                                                                                                        Ther Ex             UBE fwd and retro for shoulder ROM and postural muscles  3'/3' 3'/3' 3'/3' 3'/3'        Pulleys flex  3 min 3 min  3 min 3 min      " "   Finger ladder flex, scapt   5\"x10 flex  5\" 10x flexion          Table slides flex, scapt HEP            Seated shoulder ER stretch HEP            Sleeper stretch sidelying vs standing at wall    10\"x10  ER and   10\"x4 IR   on side          Supine cane flex AAROM  5\" 2x10 5\" 2x 10           Supine cane ER AAROM  5\" x10 5\" 10x          L UT and levator stretches  3x30\" ea 3x 30\"           Standing shoulder flex and scapt stretches with cane     5\" 2x10 ea 5\" 2x 10 ea. Fleixon and scaption         Pt education PT POC, HEP, anatomy, pathophysiology            Ther Activity                                       Gait Training                                       Modalities                                                  "

## 2024-02-15 ENCOUNTER — EVALUATION (OUTPATIENT)
Dept: PHYSICAL THERAPY | Facility: CLINIC | Age: 49
End: 2024-02-15
Payer: COMMERCIAL

## 2024-02-15 DIAGNOSIS — M75.02 ADHESIVE CAPSULITIS OF LEFT SHOULDER: Primary | ICD-10-CM

## 2024-02-15 PROCEDURE — 97110 THERAPEUTIC EXERCISES: CPT

## 2024-02-15 PROCEDURE — 97140 MANUAL THERAPY 1/> REGIONS: CPT

## 2024-02-15 NOTE — PROGRESS NOTES
"PT Re-Evaluation     Today's date: 2/15/2024  Patient name: Zaria Duke  : 1975  MRN: [id number]  Referring provider: Freda Treadwell PA-C  Dx:   Encounter Diagnosis     ICD-10-CM    1. Adhesive capsulitis of left shoulder  M75.02                      Assessment  Assessment details: Zaria Duke is a 48 y.o. female presenting to physical therapy for a reevaluation with a MD referral of adhesive capsulitis of left shoulder. Since their initial evaluation, she reports 40% improvement in her shoulder. The patient has demonstrated improved shoulder active and passive ROM, although these motions remain limited, painful, and with capsular end feel. She reports improved overhead functional mobility of the arm in her kitchen and behind her head to style her hair. She does continue with deficits in all shoulder motions, most notably shoulder IR and ER, as well as increased pain with functional use of her shoulder. This patient would benefit from continued PT services to address their impairments and functional limitations to maximize functional outcome. She was provided with an updated HEP and demonstrated/verbalized understanding it's completion.   Impairments: abnormal or restricted ROM, activity intolerance, impaired physical strength, lacks appropriate home exercise program and pain with function    Symptom irritability: moderateUnderstanding of Dx/Px/POC: excellent   Prognosis: good    Goals  STG to be achieved in 4 weeks:   The patient will report a decrease in left shoulder \"at worst\" subjective pain rating score to at least 6/10 to allow for improved tolerance for functional activities. NOT MET  The patient will increase left shoulder abduction AROM to at least 110 degrees to allow for improved functional mobility. NOT MET  The patient will increase left shoulder flexion AROM to at least 120 degrees to allow for improved functional mobility. NOT MET    LTG to be achieved by DC: ALL NOT MET " Subjective:     Isa Mcclure is a 29 y.o. female who presents for URI (x 2 days.  Pt. states that wheezing and S.O.B. is worse at night.  She tested Negative for flu, Strep and Covid19 on Monday.  She was seen at [facility]. )       URI   This is a new problem. The problem has been gradually worsening. Associated symptoms include congestion, coughing and wheezing.     Seen in urgent care on 9/20/2021.  Was having URI symptoms.  Rapid strep negative.  PCR Covid, influenza negative.    Patient reports worsening symptoms.  Has a history of asthma.  Using her albuterol inhaler.  However, does not think she is getting in a deep breath.  Using DayQuil which seems to help with the cough.    Works in healthcare.    Vaccinated against Covid.    Patient was screened prior to rooming and denied COVID-19 diagnosis or contact with a person who has been diagnosed or is suspected to have COVID-19. During this visit, appropriate PPE was worn, hand hygiene was performed, and the patient and any visitors were masked.     PMH:  has a past medical history of Allergy, Anxiety, Asthma, Depression, Drug abuse, IV ([facility]), Migraine, Substance abuse ([facility]), and Urinary tract infection. She also has no past medical history of Addisons disease ([facility]), Adrenal disorder ([facility]), Anemia, Arrhythmia, Arthritis, Blood transfusion without reported diagnosis, Cancer ([facility]), Cataract, CHF (congestive heart failure) ([facility]), Clotting disorder ([facility]), COPD (chronic obstructive pulmonary disease) ([facility]), Cushings syndrome ([facility]), Diabetes ([facility]), Diabetic neuropathy ([facility]), GERD (gastroesophageal reflux disease), Glaucoma, Goiter, Head ache, Heart attack ([facility]), Heart murmur, HIV (human immunodeficiency virus infection) ([facility]), Hyperlipidemia, Hypertension, IBD (inflammatory bowel disease), Kidney disease, Meningitis, Muscle disorder, Osteoporosis, Parathyroid disorder ([facility]), Pituitary disease ([facility]), Pulmonary emphysema ([facility]), Seizure ([facility]), Sickle cell  PROGRESSING  The patient will be independent in comprehensive HEP.   The patient will report no pain with usual activities.   The patient will improve all left shoulder AROM to WFL to allow for improved functional mobility.   The patient will increase all left shoulder MMT score to WFL to allow for improved functional mobility.   The patient will be able to complete all household lifting (laundry basket, dishes, closets, etc) without pain or difficulty.  The patient will be able to complete all ADLs without pain or difficulty.       Plan  Patient would benefit from: skilled physical therapy  Planned modality interventions: thermotherapy: hydrocollator packs, TENS and cryotherapy  Planned therapy interventions: manual therapy, joint mobilization, neuromuscular re-education, patient education, flexibility, strengthening, stretching, therapeutic activities, therapeutic exercise and home exercise program  Frequency: 2x week  Duration in weeks: 12  Plan of Care beginning date: 1/15/2024  Plan of Care expiration date: 4/8/2024  Treatment plan discussed with: patient        Subjective Evaluation    History of Present Illness  Mechanism of injury: Zaria reports 40% improvement since beginning PT services. She notes improvement in her range of motion since initial evaluation. She reports that she feels her can reach her hand up higher and with less pain associated. She says that she is able to get her hand to the back of her head to style/brush her hair which she was unable to do previously without extreme pain in the shoulder. She also says that she can reach into her kitchen cabinets better and with less pain/difficulty. She says that she can now start putting dishes/cups into the cabinets, however anything heavy such as rice or laundry bag are still too heavy for her to lift and carry. She reports continued pain and inability to reach behind her back to put on/take off her bra. She reports that she does have a follow up  disease (HCC), Stroke (HCC), Thyroid disease, or Tuberculosis.    MEDS:   Current Outpatient Medications:   •  predniSONE (DELTASONE) 20 MG Tab, Take 3 tabs daily x 2 days, then 2 tabs daily x 2 days, then 1 tab on final day., Disp: 11 Tablet, Rfl: 0  •  azithromycin (ZITHROMAX) 250 MG Tab, Take 2 tabs by mouth once today, then one tab by mouth once daily days 2-5., Disp: 6 Tablet, Rfl: 0  •  albuterol (PROVENTIL) 2.5mg/3ml Nebu Soln solution for nebulization, Take 3 mL by nebulization every four hours as needed for Shortness of Breath (and/or wheezing)., Disp: 90 mL, Rfl: 0  •  Pseudoephedrine-APAP-DM (DAYQUIL MULTI-SYMPTOM COLD/FLU PO), Take  by mouth., Disp: , Rfl:   •  ibuprofen (MOTRIN) 200 MG Tab, Take 600 mg by mouth every 6 hours as needed., Disp: , Rfl:   •  Levonorgestrel (MIRENA, 52 MG, IU), by Intrauterine route., Disp: , Rfl:   •  albuterol 108 (90 Base) MCG/ACT Aero Soln inhalation aerosol, Inhale 2 Puffs by mouth every 6 hours as needed for Shortness of Breath., Disp: 8.5 g, Rfl: 3    Current Facility-Administered Medications:   •  ipratropium-albuterol (DUONEB) nebulizer solution, 3 mL, Nebulization, Once, Ervin Jorge, A.P.R.NJayla    ALLERGIES:   Allergies   Allergen Reactions   • Bactrim Ds Hives     hives   • Hydrocodone Itching and Nausea     SURGHX:   Past Surgical History:   Procedure Laterality Date   • CHOLECYSTECTOMY       SOCHX:  reports that she has quit smoking. Her smoking use included cigarettes. She has never used smokeless tobacco. She reports previous alcohol use. She reports previous drug use. Drug: Marijuana.     FH: Reviewed with patient, not pertinent to this visit.    Review of Systems   Constitutional: Positive for malaise/fatigue.   HENT: Positive for congestion.    Respiratory: Positive for cough, shortness of breath and wheezing.    All other systems reviewed and are negative.    Additional details per HPI.      Objective:     /82   Pulse 94   Temp 36.3 °C  "(97.4 °F) (Temporal)   Resp 14   Ht 1.499 m (4' 11\")   Wt 68 kg (150 lb)   LMP  (LMP Unknown)   SpO2 97%   BMI 30.30 kg/m²     Physical Exam  Vitals reviewed.   Constitutional:       General: She is not in acute distress.     Appearance: She is well-developed. She is ill-appearing. She is not toxic-appearing.   HENT:      Head: Normocephalic.      Right Ear: External ear normal.      Left Ear: External ear normal.      Nose: Nose normal.   Eyes:      General: Vision grossly intact.      Extraocular Movements: Extraocular movements intact.      Conjunctiva/sclera: Conjunctivae normal.   Cardiovascular:      Rate and Rhythm: Normal rate and regular rhythm.      Heart sounds: Normal heart sounds.   Pulmonary:      Effort: Pulmonary effort is normal. No respiratory distress.      Breath sounds: Wheezing and rhonchi present. No decreased breath sounds.   Musculoskeletal:         General: No deformity. Normal range of motion.      Cervical back: Normal range of motion.   Skin:     General: Skin is warm and dry.      Coloration: Skin is not pale.   Neurological:      Mental Status: She is alert and oriented to person, place, and time.      Sensory: No sensory deficit.      Motor: No weakness.      Coordination: Coordination normal.   Psychiatric:         Behavior: Behavior normal. Behavior is cooperative.       Assessment/Plan:     1. SOB (shortness of breath)  - ipratropium-albuterol (DUONEB) nebulizer solution  - predniSONE (DELTASONE) 20 MG Tab; Take 3 tabs daily x 2 days, then 2 tabs daily x 2 days, then 1 tab on final day.  Dispense: 11 Tablet; Refill: 0    2. Wheeze  - ipratropium-albuterol (DUONEB) nebulizer solution  - predniSONE (DELTASONE) 20 MG Tab; Take 3 tabs daily x 2 days, then 2 tabs daily x 2 days, then 1 tab on final day.  Dispense: 11 Tablet; Refill: 0  - albuterol (PROVENTIL) 2.5mg/3ml Nebu Soln solution for nebulization; Take 3 mL by nebulization every four hours as needed for Shortness of Breath " with orthopedics on 3/8/24 to proceed forward with the CSI. She says that she does still get intense pain after her sessions or completing her HEP.  She also reports disturbances when sleeping at night.   Patient Goals  Patient goals for therapy: decreased pain, increased strength, independence with ADLs/IADLs and increased motion  Patient goal: to be back to normal  Pain  Current pain ratin  At best pain ratin  At worst pain ratin  Location: left shoulder, radiating down left arm to elbow  Quality: sharp, dull ache and throbbing  Relieving factors: change in position, medications and rest  Aggravating factors: overhead activity and lifting  Progression: improved    Social Support  Lives with: spouse and young children    Employment status: working  Hand dominance: right      Diagnostic Tests  X-ray: normal  Treatments  Previous treatment: medication and physical therapy  Current treatment: physical therapy        Objective     Palpation   Left   No palpable tenderness to the biceps, infraspinatus, supraspinatus, teres major and teres minor.   Hypertonic in the upper trapezius.   Tenderness of the levator scapulae and upper trapezius.     Tenderness     Left Shoulder   Tenderness in the AC joint, biceps tendon (proximal) and supraspinatus tendon. No tenderness in the bicipital groove, infraspinatus tendon, lateral scapula, medial scapula and scapular spine.     Active Range of Motion   Left Shoulder   Flexion: 108 (UT compensation) degrees with pain  Abduction: 94 (UT compensation) degrees with pain  External rotation BTH: T1 (UT compensation) with pain  Internal rotation BTB: Active internal rotation behind the back: L greater trochanter. with pain    Right Shoulder   Flexion: WFL  Abduction: WFL  External rotation BTH: T3   Internal rotation BTB: T6     Passive Range of Motion   Left Shoulder   Flexion: 125 degrees with pain  Abduction: 98 degrees with pain  External rotation 45°: 38 degrees with  "pain  Internal rotation 45°: 45 degrees with pain    Right Shoulder   Normal passive range of motion    Additional Passive Range of Motion Details  Capsular end feel with all L shoulder PROM     Joint Play   Left Shoulder  Hypomobile in the posterior capsule and inferior capsule.    Strength/Myotome Testing     Left Shoulder     Planes of Motion   Flexion: 4- (pain)   Abduction: 3+ (pain)   External rotation at 0°: 3+   Internal rotation at 0°: 4-     Right Shoulder     Planes of Motion   Flexion: 5   Abduction: 5   External rotation at 0°: 5   Internal rotation at 0°: 5              Precautions: Standard  Per Ortho 12/19/23: \"Left shoulder adhesive capsulitis. Focus on passive and active range of motion, pulleys. Postural exercises. Avoid strengthening at this time. Modalities as indicated\"  Access Code: [email or web address]  POC expires Unit limit Auth  expiration date PT/OT + Visit Limit?   4/8/24 N/A 5/31/24 120 per plan year, 18 used                 Visit/Unit Tracking  AUTH Status:  Date 1/15 1/22 1/25 2/1 2/12 2/15         120 per plan year Used 19 20 21 22 23 24          Remaining  101 100 99 98 97 96              Manuals 1/15 1/22 1/25 2/1 2/12 2/15       L shoulder PROM   BE SC BE SC BE       L glenohumeral post/inf mobs  NV NV BE Grade 3   NV                    Re-evaluation      BE       Neuro Re-Ed                                                                                                        Ther Ex             UBE fwd and retro for shoulder ROM and postural muscles  3'/3' 3'/3' 3'/3' 3'/3' 3'/3'       Pulleys flex  3 min 3 min  3 min 3 min  5 min       Finger ladder flex, scapt   5\"x10 flex  5\" 10x flexion          Table slides flex, scapt HEP            Seated shoulder ER stretch HEP            Sleeper stretch sidelying vs standing at wall    10\"x10  ER and   10\"x4 IR   on side          Supine cane flex AAROM  5\" 2x10 5\" 2x 10    5\" x15       Supine cane ER AAROM  5\" x10 5\" 10x   5\" x10       L UT and " (and/or wheezing).  Dispense: 90 mL; Refill: 0    3. Asthma with acute exacerbation, unspecified asthma severity, unspecified whether persistent  - ipratropium-albuterol (DUONEB) nebulizer solution  - predniSONE (DELTASONE) 20 MG Tab; Take 3 tabs daily x 2 days, then 2 tabs daily x 2 days, then 1 tab on final day.  Dispense: 11 Tablet; Refill: 0  - albuterol (PROVENTIL) 2.5mg/3ml Nebu Soln solution for nebulization; Take 3 mL by nebulization every four hours as needed for Shortness of Breath (and/or wheezing).  Dispense: 90 mL; Refill: 0    4. Bronchitis  - ipratropium-albuterol (DUONEB) nebulizer solution  - predniSONE (DELTASONE) 20 MG Tab; Take 3 tabs daily x 2 days, then 2 tabs daily x 2 days, then 1 tab on final day.  Dispense: 11 Tablet; Refill: 0  - albuterol (PROVENTIL) 2.5mg/3ml Nebu Soln solution for nebulization; Take 3 mL by nebulization every four hours as needed for Shortness of Breath (and/or wheezing).  Dispense: 90 mL; Refill: 0    5. RTI (respiratory tract infection)  - azithromycin (ZITHROMAX) 250 MG Tab; Take 2 tabs by mouth once today, then one tab by mouth once daily days 2-5.  Dispense: 6 Tablet; Refill: 0    Rx as above sent electronically.  Continue DayQuil and nebulizer inhaler.    Order for nebulizer equipment faxed to [facility].    Work note provided.    Discharge summary provided.     DuoNeb given in clinic.  Lung sounds improving.  Had patient reports feeling better.  SpO2 100%.    Warning signs reviewed. Return precautions discussed.     Differential diagnosis, natural history, supportive care, over-the-counter symptom management per 's instructions, close monitoring, and indications for immediate follow-up discussed.     All questions answered. Patient agrees with the plan of care.    Billing note: chronic illness with exacerbation/progression; prescription drug management     Billing note: 30 minutes was allotted and spent for patient care and coordination of care (not  "levator stretches  3x30\" ea 3x 30\"           Standing shoulder flex and scapt stretches with cane     5\" 2x10 ea 5\" 2x 10 ea. Fleixon and scaption  5\"x15 scaption       Shoulder IR stretch with cane vs strap      With cane   10\"x10        Pt education PT POC, HEP, anatomy, pathophysiology     HEP review       Ther Activity                                       Gait Training                                       Modalities                                              " reported separately) including preparing for the visit, obtaining/reviewing history, performing an exam/evaluation, ordering Rx/medical equipment, developing a plan of care, counseling/educating the patient, developing/printing/going over the discharge summary with the patient, producing a work note, and documentation. Care specific to this encounter was summarized here. Please refer to the chart for additional details on the care provided.

## 2024-02-19 ENCOUNTER — APPOINTMENT (OUTPATIENT)
Dept: PHYSICAL THERAPY | Facility: CLINIC | Age: 49
End: 2024-02-19
Payer: COMMERCIAL

## 2024-02-21 PROBLEM — Z12.39 SCREENING FOR BREAST CANCER: Status: RESOLVED | Noted: 2019-11-22 | Resolved: 2024-02-21

## 2024-02-22 ENCOUNTER — OFFICE VISIT (OUTPATIENT)
Dept: PHYSICAL THERAPY | Facility: CLINIC | Age: 49
End: 2024-02-22
Payer: COMMERCIAL

## 2024-02-22 DIAGNOSIS — M75.02 ADHESIVE CAPSULITIS OF LEFT SHOULDER: Primary | ICD-10-CM

## 2024-02-22 PROCEDURE — 97110 THERAPEUTIC EXERCISES: CPT

## 2024-02-22 PROCEDURE — 97140 MANUAL THERAPY 1/> REGIONS: CPT

## 2024-02-22 NOTE — PROGRESS NOTES
"Daily Note     Today's date: 2024  Patient name: Zaria Duke  : 1975  MRN: [id number]  Referring provider: Freda Treadwell PA-C  Dx:   Encounter Diagnosis     ICD-10-CM    1. Adhesive capsulitis of left shoulder  M75.02           Start Time:   Stop Time:   Total time in clinic (min): 38 minutes    Subjective: Pt noted that she is sore but not more than the normal that she feels.   Pt noted that she does get a injection in 1. 5 weeks.     Objective: See treatment diary below      Assessment: Continued with treatment session with focus on L shoulder. Pt was able to complete all exercises with proper form. Difficulty noted with IR stretch this visit in L shoulder unable to complete more than 4x.  Tolerated treatment well. Patient exhibited good technique with therapeutic exercises and would benefit from continued PT. Pt noted that she does have some soreness after treatment session       Plan: Continue per plan of care.      Precautions: Standard  Per Ortho 23: \"Left shoulder adhesive capsulitis. Focus on passive and active range of motion, pulleys. Postural exercises. Avoid strengthening at this time. Modalities as indicated\"  Access Code: [email or web address]  POC expires Unit limit Auth  expiration date PT/OT + Visit Limit?   24 N/A 24 120 per plan year, 18 used                 Visit/Unit Tracking  AUTH Status:  Date 1/15 1/22 1/25 2/1 2/12 2/15 2/22        120 per plan year Used 19 20 21 22 23 24 25         Remaining  101 100 99 98 97 96 95             Manuals 1/15 1/22 1/25 2/1 2/12 2/15 2/22      L shoulder PROM   BE SC BE SC BE SC      L glenohumeral post/inf mobs  NV NV BE Grade 3   NV                    Re-evaluation      BE       Neuro Re-Ed                                                                                                        Ther Ex             UBE fwd and retro for shoulder ROM and postural muscles  3'/3' 3'/3' 3'/3' 3'/3' 3'/3' 3'/3'      Pulleys flex  3 min 3 " "min  3 min 3 min  5 min 5 min       Finger ladder flex, scapt   5\"x10 flex  5\" 10x flexion          Table slides flex, scapt HEP            Seated shoulder ER stretch HEP            Sleeper stretch sidelying vs standing at wall    10\"x10  ER and   10\"x4 IR   on side          Supine cane flex AAROM  5\" 2x10 5\" 2x 10    5\" x15 5\" 15x       Supine cane ER AAROM  5\" x10 5\" 10x   5\" x10 5\" 10x       L UT and levator stretches  3x30\" ea 3x 30\"           Standing shoulder flex and scapt stretches with cane     5\" 2x10 ea 5\" 2x 10 ea. Fleixon and scaption  5\"x15 scaption [id number]      Shoulder IR stretch with cane vs strap      With cane   10\"x10  With cane   10\"x4 p!      Pt education PT POC, HEP, anatomy, pathophysiology     HEP review       Ther Activity                                       Gait Training                                       Modalities                                              "

## 2024-02-26 ENCOUNTER — OFFICE VISIT (OUTPATIENT)
Dept: PHYSICAL THERAPY | Facility: CLINIC | Age: 49
End: 2024-02-26
Payer: COMMERCIAL

## 2024-02-26 DIAGNOSIS — M75.02 ADHESIVE CAPSULITIS OF LEFT SHOULDER: Primary | ICD-10-CM

## 2024-02-26 DIAGNOSIS — R11.0 NAUSEA: ICD-10-CM

## 2024-02-26 PROCEDURE — 97140 MANUAL THERAPY 1/> REGIONS: CPT

## 2024-02-26 PROCEDURE — 97110 THERAPEUTIC EXERCISES: CPT

## 2024-02-26 RX ORDER — FAMOTIDINE 20 MG/1
20 TABLET, FILM COATED ORAL 2 TIMES DAILY
Qty: 180 TABLET | Refills: 2 | Status: SHIPPED | OUTPATIENT
Start: 2024-02-26

## 2024-02-26 NOTE — PROGRESS NOTES
"Daily Note     Today's date: 2024  Patient name: Zaria Duke  : 1975  MRN: [id number]  Referring provider: Freda Treadwell PA-C  Dx:   Encounter Diagnosis     ICD-10-CM    1. Adhesive capsulitis of left shoulder  M75.02                      Subjective: Patient reports that her shoulder feels sore today. She is scheduled for an injection next Friday in the shoulder.       Objective: See treatment diary below      Assessment: Tolerated treatment well. Able to initiate shoulder flexion stretch in standing position with gradually improving shoulder flexion ROM noted. Much improved shoulder scaption PROM  at end of session compared to previous. Patient demonstrated fatigue post treatment, exhibited good technique with therapeutic exercises, and would benefit from continued PT      Plan: Progress treatment as tolerated.       Precautions: Standard  Per Ortho 23: \"Left shoulder adhesive capsulitis. Focus on passive and active range of motion, pulleys. Postural exercises. Avoid strengthening at this time. Modalities as indicated\"  Access Code: [email or web address]  POC expires Unit limit Auth  expiration date PT/OT + Visit Limit?   24 N/A 24 120 per plan year, 18 used                 Visit/Unit Tracking  AUTH Status:  Date 1/15 1/22 1/25 2/1 2/12 2/15 2/22 2/26       120 per plan year Used 19 20 21 22 23 24 25 26        Remaining  101 100 99 98 97 96 95 94            Manuals 1/15 1/22 1/25 2/1 2/12 2/15 2/22 2/26     L shoulder PROM   BE SC BE SC BE SC BE     L glenohumeral post/inf mobs  NV NV BE Grade 3   NV  BE  Grade 3-4                  Re-evaluation      BE       Neuro Re-Ed                                                                                                        Ther Ex             UBE fwd and retro for shoulder ROM and postural muscles  3'/3' 3'/3' 3'/3' 3'/3' 3'/3' 3'/3' 3'/3'     Pulleys flex  3 min 3 min  3 min 3 min  5 min 5 min  5 min     Finger ladder flex, scapt   5\"x10 flex  " "5\" 10x flexion          Sleeper stretch sidelying vs standing at wall    10\"x10  ER and   10\"x4 IR   on side          Supine cane flex AAROM  5\" 2x10 5\" 2x 10    5\" x15 5\" 15x       Supine cane ER AAROM  5\" x10 5\" 10x   5\" x10 5\" 10x  5\"x10     L UT and levator stretches  3x30\" ea 3x 30\"           Standing shoulder flex and scapt stretches with cane     5\" 2x10 ea 5\" 2x 10 ea. Fleixon and scaption  5\"x15 scaption 5\"[id number] 5\" x10 flex and scapt      Shoulder IR stretch with cane vs strap      With cane   10\"x10  With cane   10\"x4 p!      Wall slides flex        5\"x10      Pt education PT POC, HEP, anatomy, pathophysiology     HEP review       Ther Activity                                       Gait Training                                       Modalities                                                "

## 2024-02-29 ENCOUNTER — OFFICE VISIT (OUTPATIENT)
Dept: PHYSICAL THERAPY | Facility: CLINIC | Age: 49
End: 2024-02-29
Payer: COMMERCIAL

## 2024-02-29 DIAGNOSIS — M75.02 ADHESIVE CAPSULITIS OF LEFT SHOULDER: Primary | ICD-10-CM

## 2024-02-29 PROCEDURE — 97140 MANUAL THERAPY 1/> REGIONS: CPT

## 2024-02-29 PROCEDURE — 97110 THERAPEUTIC EXERCISES: CPT

## 2024-02-29 NOTE — PROGRESS NOTES
"Daily Note     Today's date: 2024  Patient name: Zaria Duke  : 1975  MRN: [id number]  Referring provider: Freda Treadwell PA-C  Dx:   Encounter Diagnosis     ICD-10-CM    1. Adhesive capsulitis of left shoulder  M75.02           Start Time:   Stop Time:   Total time in clinic (min): 43 minutes    Subjective: pt noted that she is sore and noted that she has not been taking any brakes.       Objective: See treatment diary below      Assessment:  Continued with treatment session with minimal to no progressions at this time. Tolerated treatment well. Patient exhibited good technique with therapeutic exercises and would benefit from continued PT      Plan: Continue per plan of care.      Precautions: Standard  Per Ortho 23: \"Left shoulder adhesive capsulitis. Focus on passive and active range of motion, pulleys. Postural exercises. Avoid strengthening at this time. Modalities as indicated\"  Access Code: [email or web address]  POC expires Unit limit Auth  expiration date PT/OT + Visit Limit?   24 N/A 24 120 per plan year, 18 used                 Visit/Unit Tracking  AUTH Status:  Date 1/15 1/22 1/25 2/1 2/12 2/15 2/22 2/26 2/29      120 per plan year Used 19 20 21 22 23 24 25 26 27       Remaining  101 100 99 98 97 96 95 94 93           Manuals 1/15 1/22 1/25 2/1 2/12 2/15 2/22 2/26 2/29    L shoulder PROM   BE SC BE SC BE SC BE SC    L glenohumeral post/inf mobs  NV NV BE Grade 3   NV  BE  Grade 3-4                  Re-evaluation      BE       Neuro Re-Ed                                                                                                        Ther Ex             UBE fwd and retro for shoulder ROM and postural muscles  3'/3' 3'/3' 3'/3' 3'/3' 3'/3' 3'/3' 3'/3' 3'/3'     Pulleys flex  3 min 3 min  3 min 3 min  5 min 5 min  5 min 5 min     Finger ladder flex, scapt   5\"x10 flex  5\" 10x flexion          Sleeper stretch sidelying vs standing at wall    10\"x10  ER and   10\"x4 IR   on " "side          Supine cane flex AAROM  5\" 2x10 5\" 2x 10    5\" x15 5\" 15x       Supine cane ER AAROM  5\" x10 5\" 10x   5\" x10 5\" 10x  5\"x10 5\" 10x     L UT and levator stretches  3x30\" ea 3x 30\"           Standing shoulder flex and scapt stretches with cane     5\" 2x10 ea 5\" 2x 10 ea. Fleixon and scaption  5\"x15 scaption 5\"[id number] 5\" x10 flex and scapt  5\" x10 flex and scapt     Shoulder IR stretch with cane vs strap      With cane   10\"x10  With cane   10\"x4 p!      Wall slides flex        5\"x10  5\" 10x     Pt education PT POC, HEP, anatomy, pathophysiology     HEP review       Ther Activity                                       Gait Training                                       Modalities                                                  "

## 2024-03-04 ENCOUNTER — OFFICE VISIT (OUTPATIENT)
Dept: PHYSICAL THERAPY | Facility: CLINIC | Age: 49
End: 2024-03-04
Payer: COMMERCIAL

## 2024-03-04 DIAGNOSIS — M75.02 ADHESIVE CAPSULITIS OF LEFT SHOULDER: Primary | ICD-10-CM

## 2024-03-04 PROCEDURE — 97140 MANUAL THERAPY 1/> REGIONS: CPT

## 2024-03-04 PROCEDURE — 97110 THERAPEUTIC EXERCISES: CPT

## 2024-03-04 NOTE — PROGRESS NOTES
"Daily Note     Today's date: 3/4/2024  Patient name: Zaria Duke  : 1975  MRN: [id number]  Referring provider: Freda Treadwell PA-C  Dx:   Encounter Diagnosis     ICD-10-CM    1. Adhesive capsulitis of left shoulder  M75.02                      Subjective: Patient reports that she experienced a sharp throbbing sensation in the shoulder while working at her desk. She said it occurred approximately 10 times and then went away.       Objective: See treatment diary below      Assessment: Tolerated treatment well. Continues with significant pain and tightness with shoulder IR PROM. Initiated shoulder extension stretch with cane to improve ability to achieve IR stretch. Patient demonstrated fatigue post treatment, exhibited good technique with therapeutic exercises, and would benefit from continued PT      Plan: Continue per plan of care.      Precautions: Standard  Per Ortho 23: \"Left shoulder adhesive capsulitis. Focus on passive and active range of motion, pulleys. Postural exercises. Avoid strengthening at this time. Modalities as indicated\"  Access Code: [email or web address]  POC expires Unit limit Auth  expiration date PT/OT + Visit Limit?   24 N/A 24 120 per plan year, 18 used                 Visit/Unit Tracking  AUTH Status:  Date 1/15 1/22 1/25 2/1 2/12 2/15 2/22 2/26 2/29 3/4     120 per plan year Used 19 20 21 22 23 24 25 26 27 28      Remaining  101 100 99 98 97 96 95 94 93 92          Manuals 1/15 1/22 1/25 2/1 2/12 2/15 2/22 2/26 2/29 3/   L shoulder PROM   BE SC BE SC BE SC BE SC BE   L glenohumeral post/inf mobs  NV NV BE Grade 3   NV  BE  Grade 3-4  BE  Grade 3-4                Re-evaluation      BE       Neuro Re-Ed                                                                                                        Ther Ex             UBE fwd and retro for shoulder ROM and postural muscles  3'/3' 3'/3' 3'/3' 3'/3' 3'/3' 3'/3' 3'/3' 3'/3'  3'/3'   Pulleys flex  3 min 3 min  3 min 3 min  " "5 min 5 min  5 min 5 min  5 min   Finger ladder flex, scapt   5\"x10 flex  5\" 10x flexion          Sleeper stretch sidelying vs standing at wall    10\"x10  ER and   10\"x4 IR   on side          Supine cane flex AAROM  5\" 2x10 5\" 2x 10    5\" x15 5\" 15x       Supine cane ER AAROM  5\" x10 5\" 10x   5\" x10 5\" 10x  5\"x10 5\" 10x  5\"x10   L UT and levator stretches  3x30\" ea 3x 30\"           Standing shoulder flex and scapt stretches with cane     5\" 2x10 ea 5\" 2x 10 ea. Fleixon and scaption  5\"x15 scaption 5\"[id number] 5\" x10 flex and scapt  5\" x10 flex and scapt     Shoulder IR stretch with cane vs strap      With cane   10\"x10  With cane   10\"x4 p!   With strap 5\" x10   Wall slides flex        5\"x10  5\" 10x  5\"x15   Shoulder ext stretch with cane          5\"x10   Pt education PT POC, HEP, anatomy, pathophysiology     HEP review       Ther Activity                                       Gait Training                                       Modalities                                                    "

## 2024-03-08 ENCOUNTER — OFFICE VISIT (OUTPATIENT)
Dept: OBGYN CLINIC | Facility: CLINIC | Age: 49
End: 2024-03-08
Payer: COMMERCIAL

## 2024-03-08 VITALS
BODY MASS INDEX: 35.85 KG/M2 | DIASTOLIC BLOOD PRESSURE: 77 MMHG | SYSTOLIC BLOOD PRESSURE: 113 MMHG | HEIGHT: 62 IN | HEART RATE: 91 BPM | WEIGHT: 194.8 LBS

## 2024-03-08 DIAGNOSIS — M25.512 CHRONIC LEFT SHOULDER PAIN: ICD-10-CM

## 2024-03-08 DIAGNOSIS — G89.29 CHRONIC LEFT SHOULDER PAIN: ICD-10-CM

## 2024-03-08 DIAGNOSIS — M75.02 ADHESIVE CAPSULITIS OF LEFT SHOULDER: Primary | ICD-10-CM

## 2024-03-08 PROCEDURE — 20610 DRAIN/INJ JOINT/BURSA W/O US: CPT | Performed by: ORTHOPAEDIC SURGERY

## 2024-03-08 PROCEDURE — 99214 OFFICE O/P EST MOD 30 MIN: CPT | Performed by: ORTHOPAEDIC SURGERY

## 2024-03-08 RX ORDER — LIDOCAINE HYDROCHLORIDE 10 MG/ML
2 INJECTION, SOLUTION INFILTRATION; PERINEURAL
Status: COMPLETED | OUTPATIENT
Start: 2024-03-08 | End: 2024-03-08

## 2024-03-08 RX ORDER — BUPIVACAINE HYDROCHLORIDE 2.5 MG/ML
2 INJECTION, SOLUTION INFILTRATION; PERINEURAL
Status: COMPLETED | OUTPATIENT
Start: 2024-03-08 | End: 2024-03-08

## 2024-03-08 RX ORDER — METHYLPREDNISOLONE ACETATE 40 MG/ML
2 INJECTION, SUSPENSION INTRA-ARTICULAR; INTRALESIONAL; INTRAMUSCULAR; SOFT TISSUE
Status: COMPLETED | OUTPATIENT
Start: 2024-03-08 | End: 2024-03-08

## 2024-03-08 RX ADMIN — LIDOCAINE HYDROCHLORIDE 2 ML: 10 INJECTION, SOLUTION INFILTRATION; PERINEURAL at 07:30

## 2024-03-08 RX ADMIN — BUPIVACAINE HYDROCHLORIDE 2 ML: 2.5 INJECTION, SOLUTION INFILTRATION; PERINEURAL at 07:30

## 2024-03-08 RX ADMIN — METHYLPREDNISOLONE ACETATE 2 ML: 40 INJECTION, SUSPENSION INTRA-ARTICULAR; INTRALESIONAL; INTRAMUSCULAR; SOFT TISSUE at 07:30

## 2024-03-08 NOTE — PROGRESS NOTES
Patient Name:  Zaria Duke  MRN:  [id number]    Assessment & Plan     1. Adhesive capsulitis of left shoulder  -     Large joint arthrocentesis: L glenohumeral  -     Ambulatory referral to Physical Therapy; Future    2. Chronic left shoulder pain  -     Large joint arthrocentesis: L glenohumeral  -     Ambulatory referral to Physical Therapy; Future        49 y.o. female with Left shoulder adhesive capsulitis with persistent pain.   Treatment options were once again discussed including continued physical therapy, home exercises, corticosteroid injections, oral analgesics  She was encouraged to continue her shoulder stretches at home  New PT script was placed to continue to work on range of motion and postural exercises  The patient was offered a corticosteroid injection for their left shoulder. Risks and benefits were discussed in detail. They tolerated the procedure well.    The patient was educated they may have some irritation in the next few days and should rest, ice, elevate and perform gentle range of motion exercises. They were advised the medicine should begin to work in a few days time.    The patient was informed corticosteroid injections can be repeated at the earliest every 3 months.   If symptoms persist, can consider further imaging to rule out rotator cuff pathology  OTC analgesics as needed for pain management      History of the Present Illness   Zaria Duke is a 49 y.o. female with Left shoulder adhesive capsulitis. The patient was last seen in the office on 12/19/2023 when she was provided a medrol dosepak and a home exercise plan. She did have mild relief with the oral steroids. Today, the patient reports persistent soreness. She feels her forward flexion motion has improved with physical therapy, but she is still lacking internal rotation and abduction. She feels throbbing in her shoulder following physical therapy. She takes Tylenol for pain management. The patient is requesting a  "corticosteroid injection today.         Review of Systems     Review of Systems   Constitutional:  Negative for chills and fever.   HENT:  Negative for ear pain and sore throat.    Eyes:  Negative for pain and visual disturbance.   Respiratory:  Negative for cough and shortness of breath.    Cardiovascular:  Negative for chest pain and palpitations.   Gastrointestinal:  Negative for abdominal pain and vomiting.   Genitourinary:  Negative for dysuria and hematuria.   Musculoskeletal:  Negative for arthralgias and back pain.   Skin:  Negative for color change and rash.   Neurological:  Negative for seizures and syncope.   All other systems reviewed and are negative.      Physical Exam     /77   Pulse 91   Ht 5' 1.5\" (1.562 m)   Wt 88.4 kg (194 lb 12.8 oz)   BMI 36.21 kg/m²     Left  Shoulder:   Active range of motion   100 degrees forward flexion  90 degrees abduction  30 degrees external rotation   SI joint internal rotation    Passive range of motion   120 degrees of forward flexion     There is no tenderness present over the shoulder.   Supraspinatus testing 4+/5  Infraspinatus testing 4+/5  Subscapularis testing 4+/5  The patient is neurovascularly intact distally in the extremity.      Data Review     I have personally reviewed pertinent films in PACS, and my interpretation follows.    X-rays taken 12/19/2023 of Left shoulder independently reviewed and demonstrate no acute fracture, dislocation or significant degenerative changes.      Social History     Tobacco Use    Smoking status: Never     Passive exposure: Never    Smokeless tobacco: Never   Vaping Use    Vaping status: Never Used   Substance Use Topics    Alcohol use: Not Currently     Comment: social    Drug use: No           Large joint arthrocentesis: L glenohumeral  Universal Protocol:  Consent: Verbal consent obtained.  Risks and benefits: risks, benefits and alternatives were discussed  Consent given by: patient  Time out: Immediately prior " "to procedure a \"time out\" was called to verify the correct patient, procedure, equipment, support staff and site/side marked as required.  Patient understanding: patient states understanding of the procedure being performed  Site marked: the operative site was marked  Patient identity confirmed: verbally with patient  Supporting Documentation  Indications: pain   Procedure Details  Location: shoulder - L glenohumeral  Preparation: Patient was prepped and draped in the usual sterile fashion  Needle size: 22 G  Ultrasound guidance: no  Approach: superior  Medications administered: 2 mL bupivacaine 0.25 %; 2 mL methylPREDNISolone acetate 40 mg/mL; 2 mL lidocaine 1 %    Patient tolerance: patient tolerated the procedure well with no immediate complications  Dressing:  Sterile dressing applied            Adal Nath DO   Scribe Attestation      I,:  Patsy Oropeza am acting as a scribe while in the presence of the attending physician.:       I,:  Adal Nath DO personally performed the services described in this documentation    as scribed in my presence.:             "

## 2024-03-11 ENCOUNTER — OFFICE VISIT (OUTPATIENT)
Dept: PHYSICAL THERAPY | Facility: CLINIC | Age: 49
End: 2024-03-11
Payer: COMMERCIAL

## 2024-03-11 DIAGNOSIS — M75.02 ADHESIVE CAPSULITIS OF LEFT SHOULDER: Primary | ICD-10-CM

## 2024-03-11 PROCEDURE — 97140 MANUAL THERAPY 1/> REGIONS: CPT

## 2024-03-11 PROCEDURE — 97110 THERAPEUTIC EXERCISES: CPT

## 2024-03-11 NOTE — PROGRESS NOTES
"Daily Note     Today's date: 3/11/2024  Patient name: Zaria Duke  : 1975  MRN: [id number]  Referring provider: Freda Treadwell PA-C  Dx:   Encounter Diagnosis     ICD-10-CM    1. Adhesive capsulitis of left shoulder  M75.02           Start Time:   Stop Time:   Total time in clinic (min): 46 minutes    Subjective: Pt noted that she has been feeling a different pain since her injection on Friday.       Objective: See treatment diary below      Assessment:  Continued with treatment session she noted that was was able to tolerate more of the stretch but noted that she did still have some struggle with L shoulder IR with cane.  Tolerated treatment well. Patient exhibited good technique with therapeutic exercises and would benefit from continued PT increase ROM noted with PROM this visit WNL of flexion and abduction with minimal discomfort noted.       Plan: Continue per plan of care.      Precautions: Standard  Per Ortho 23: \"Left shoulder adhesive capsulitis. Focus on passive and active range of motion, pulleys. Postural exercises. Avoid strengthening at this time. Modalities as indicated\"  Access Code: [email or web address]  POC expires Unit limit Auth  expiration date PT/OT + Visit Limit?   24 N/A 24 120 per plan year, 18 used                 Visit/Unit Tracking  AUTH Status:  Date 1/15 1/22 1/25 2/1 2/12 2/15 2/22 2/26 2/29 3/4 3/11    120 per plan year Used 19 20 21 22 23 24 25 26 27 28 29     Remaining  101 100 99 98 97 96 95 94 93 92 91         Manuals 3/11 1/22 1/25 2/1 2/12 2/15 2/22 2/26 2/29 3/4   L shoulder PROM  SC BE SC BE SC BE SC BE SC BE   L glenohumeral post/inf mobs  NV NV BE Grade 3   NV  BE  Grade 3-4  BE  Grade 3-4                Re-evaluation      BE       Neuro Re-Ed                                                                                                        Ther Ex             UBE fwd and retro for shoulder ROM and postural muscles 3'/3 3'/3' 3'/3' 3'/3' 3'/3' " "3'/3' 3'/3' 3'/3' 3'/3'  3'/3'   Pulleys flex 5 min  3 min 3 min  3 min 3 min  5 min 5 min  5 min 5 min  5 min   Finger ladder flex, scapt   5\"x10 flex  5\" 10x flexion          Sleeper stretch sidelying vs standing at wall    10\"x10  ER and   10\"x4 IR   on side          Supine cane flex AAROM  5\" 2x10 5\" 2x 10    5\" x15 5\" 15x       Supine cane ER AAROM  5\" x10 5\" 10x   5\" x10 5\" 10x  5\"x10 5\" 10x  5\"x10   L UT and levator stretches  3x30\" ea 3x 30\"           Standing shoulder flex and scapt stretches with cane     5\" 2x10 ea 5\" 2x 10 ea. Fleixon and scaption  5\"x15 scaption [id number]\"[id number] 5\" x10 flex and scapt  5\" x10 flex and scapt     Shoulder IR stretch with cane vs strap With strap 5\" 10x      With cane   10\"x10  With cane   10\"x4 p!   With strap 5\" x10   Wall slides flex 5\" x 15       5\"x10  5\" 10x  5\"x15   Shoulder ext stretch with cane 5\"10x          5\"x10   Pt education      HEP review       Ther Activity                                       Gait Training                                       Modalities                                                      "

## 2024-03-14 ENCOUNTER — OFFICE VISIT (OUTPATIENT)
Dept: PHYSICAL THERAPY | Facility: CLINIC | Age: 49
End: 2024-03-14
Payer: COMMERCIAL

## 2024-03-14 DIAGNOSIS — G89.29 CHRONIC LEFT SHOULDER PAIN: ICD-10-CM

## 2024-03-14 DIAGNOSIS — M25.512 CHRONIC LEFT SHOULDER PAIN: ICD-10-CM

## 2024-03-14 DIAGNOSIS — M75.02 ADHESIVE CAPSULITIS OF LEFT SHOULDER: Primary | ICD-10-CM

## 2024-03-14 PROCEDURE — 97140 MANUAL THERAPY 1/> REGIONS: CPT

## 2024-03-14 PROCEDURE — 97110 THERAPEUTIC EXERCISES: CPT

## 2024-03-14 NOTE — PROGRESS NOTES
"Daily Note    Today's date: 24  Patient name: Zaria Duke  : 1975  MRN: [id number]  Referring provider: Freda Treadwell PA-C  Dx:   Encounter Diagnosis     ICD-10-CM    1. Adhesive capsulitis of left shoulder  M75.02 Ambulatory referral to Physical Therapy      2. Chronic left shoulder pain  M25.512 Ambulatory referral to Physical Therapy    G89.29           Start Time: 1748  Stop Time: 1830  Total time in clinic (min): 42 minutes      Subjective: Zaria reports feeling a little sore today.    Objective: See treatment diary below.    Assessment: Zaria tolerated treatment well with consistent cuing throughout. TE's were performed with increased reps. No new TE's were performed today. Following treatment, the patient demonstrated fatigue post treatment, exhibited good technique with therapeutic exercises, and would benefit from continued PT.     Plan: Continue per plan of care.        Precautions: Standard  Per Ortho 23: \"Left shoulder adhesive capsulitis. Focus on passive and active range of motion, pulleys. Postural exercises. Avoid strengthening at this time. Modalities as indicated\"  Access Code: [email or web address]  POC expires Unit limit Auth  expiration date PT/OT + Visit Limit?   24 N/A 24 120 per plan year, 18 used                 Visit/Unit Tracking  AUTH Status:  Date 1/15 1/22 1/25 2/1 2/12 2/15 2/22 2/26 2/29 3/4 3/11 3/14   120 per plan year Used 19 20 21 22 23 24 25 26 27 28 29 30    Remaining  101 100 99 98 97 96 95 94 93 92 91 90        Manuals 3/11 3/14  2/1 2/12 2/15 2/22 2/26 2/29 3/4   L shoulder PROM  SC TS  BE SC BE SC BE SC BE   L glenohumeral post/inf mobs  TS - g3/g4  BE Grade 3   NV  BE  Grade 3-4  BE  Grade 3-4                Re-evaluation      BE       Neuro Re-Ed                                                                                                        Ther Ex             UBE fwd and retro for shoulder ROM and postural muscles 3'/3 3'/3'   3'/3' 3'/3' " "3'/3' 3'/3' 3'/3' 3'/3'  3'/3'   Pulleys flex 5 min  5'   3 min 3 min  5 min 5 min  5 min 5 min  5 min   Finger ladder flex, scapt   10x5\"           Sleeper stretch sidelying vs standing at wall    10\"x10  ER and   10\"x4 IR   on side          Supine cane flex AAROM      5\" x15 5\" 15x       Supine cane ER AAROM      5\" x10 5\" 10x  5\"x10 5\" 10x  5\"x10   L UT and levator stretches             Standing shoulder flex and scapt stretches with cane     5\" 2x10 ea 5\" 2x 10 ea. Fleixon and scaption  5\"x15 scaption 5\"[id number] 5\" x10 flex and scapt  5\" x10 flex and scapt     Shoulder IR stretch with cane vs strap With strap 5\" 10x  With strap 5\" 10x    With cane   10\"x10  With cane   10\"x4 p!   With strap 5\" x10   Wall slides flex 5\" x 15 5\" x 15      5\"x10  5\" 10x  5\"x15   Shoulder ext stretch with cane 5\"10x  5\" 10x        5\"x10   Pt education      HEP review       Ther Activity                                       Gait Training                                       Modalities                                          Graham Arellano, PT  3/14/2024,6:30 PM  "

## 2024-03-18 ENCOUNTER — APPOINTMENT (OUTPATIENT)
Dept: PHYSICAL THERAPY | Facility: CLINIC | Age: 49
End: 2024-03-18
Payer: COMMERCIAL

## 2024-03-21 ENCOUNTER — OFFICE VISIT (OUTPATIENT)
Dept: PHYSICAL THERAPY | Facility: CLINIC | Age: 49
End: 2024-03-21
Payer: COMMERCIAL

## 2024-03-21 DIAGNOSIS — M75.02 ADHESIVE CAPSULITIS OF LEFT SHOULDER: Primary | ICD-10-CM

## 2024-03-21 DIAGNOSIS — G89.29 CHRONIC LEFT SHOULDER PAIN: ICD-10-CM

## 2024-03-21 DIAGNOSIS — M25.512 CHRONIC LEFT SHOULDER PAIN: ICD-10-CM

## 2024-03-21 PROCEDURE — 97110 THERAPEUTIC EXERCISES: CPT

## 2024-03-21 PROCEDURE — 97140 MANUAL THERAPY 1/> REGIONS: CPT

## 2024-03-21 NOTE — PROGRESS NOTES
"Daily Note     Today's date: 3/21/2024  Patient name: Zaria Duke  : 1975  MRN: [id number]  Referring provider: Freda Treadwell PA-C  Dx:   Encounter Diagnosis     ICD-10-CM    1. Adhesive capsulitis of left shoulder  M75.02       2. Chronic left shoulder pain  M25.512     G89.29                      Subjective: Patient reports that she is noticing continued improvements in her shoulder motion, especially now that she can actually reach to her back pocket with the IR stretch.       Objective: See treatment diary below      Assessment: Tolerated treatment well. Continues with capsular end feel during shoulder PROM, however motion and tolerance to stretching overall much improved since CSI. Patient demonstrated fatigue post treatment, exhibited good technique with therapeutic exercises, and would benefit from continued PT      Plan: Progress treatment as tolerated.       Precautions: Standard  Per Ortho 23: \"Left shoulder adhesive capsulitis. Focus on passive and active range of motion, pulleys. Postural exercises. Avoid strengthening at this time. Modalities as indicated\"  Access Code: [email or web address]  POC expires Unit limit Auth  expiration date PT/OT + Visit Limit?   24 N/A 24 120 per plan year, 18 used                 Visit/Unit Tracking  AUTH Status:  Date 3/21  1/25 2/1 2/12 2/15 2/22 2/26 2/29 3/4 3/11 3/14   120 per plan year Used   21 22 23 24 25 26 27 28 29 30    Remaining  89  99 98 97 96 95 94 93 92 91 90        Manuals 3/11 3/14 3/21  2/12 2/15 2/22 2/26 2/29 3/4   L shoulder PROM  SC TS BE  SC BE SC BE SC BE   L glenohumeral post/inf mobs  TS - g3/g4 BE Grade 3-4   NV  BE  Grade 3-4  BE  Grade 3-4                Re-evaluation      BE       Neuro Re-Ed                                                                                                        Ther Ex             UBE fwd and retro for shoulder ROM and postural muscles 3'/3 3'/3'  3'/3'  3'/3' 3'/3' 3'/3' 3'/3' 3'/3'  3'/3' " "  Pulleys flex 5 min  5'  5 min  3 min  5 min 5 min  5 min 5 min  5 min   Finger ladder flex, scapt   10x5\"           Sleeper stretch sidelying vs standing at wall             Supine cane flex AAROM   5\"x15   5\" x15 5\" 15x       Supine cane ER AAROM      5\" x10 5\" 10x  5\"x10 5\" 10x  5\"x10   Standing shoulder flex and scapt stretches with cane      5\" 2x 10 ea. Fleixon and scaption  5\"x15 scaption 5\"[id number] 5\" x10 flex and scapt  5\" x10 flex and scapt     Shoulder IR stretch with cane vs strap With strap 5\" 10x  With strap 5\" 10x With strap 5\" 10x   With cane   10\"x10  With cane   10\"x4 p!   With strap 5\" x10   Wall slides flex 5\" x 15 5\" x 15 5\"x20 with ecc lower     5\"x10  5\" 10x  5\"x15   Shoulder ext stretch with cane 5\"10x  5\" 10x 5\" 10x       5\"x10   Pt education      HEP review       Ther Activity                                       Gait Training                                       Modalities                                            "

## 2024-03-25 ENCOUNTER — APPOINTMENT (OUTPATIENT)
Dept: PHYSICAL THERAPY | Facility: CLINIC | Age: 49
End: 2024-03-25
Payer: COMMERCIAL

## 2024-03-28 ENCOUNTER — OFFICE VISIT (OUTPATIENT)
Dept: PHYSICAL THERAPY | Facility: CLINIC | Age: 49
End: 2024-03-28
Payer: COMMERCIAL

## 2024-03-28 DIAGNOSIS — M25.512 CHRONIC LEFT SHOULDER PAIN: ICD-10-CM

## 2024-03-28 DIAGNOSIS — M75.02 ADHESIVE CAPSULITIS OF LEFT SHOULDER: Primary | ICD-10-CM

## 2024-03-28 DIAGNOSIS — G89.29 CHRONIC LEFT SHOULDER PAIN: ICD-10-CM

## 2024-03-28 PROCEDURE — 97140 MANUAL THERAPY 1/> REGIONS: CPT

## 2024-03-28 PROCEDURE — 97110 THERAPEUTIC EXERCISES: CPT

## 2024-03-28 NOTE — PROGRESS NOTES
"Daily Note     Today's date: 3/28/2024  Patient name: Zaria Duke  : 1975  MRN: [id number]  Referring provider: Freda Treadwell PA-C  Dx:   Encounter Diagnosis     ICD-10-CM    1. Adhesive capsulitis of left shoulder  M75.02       2. Chronic left shoulder pain  M25.512     G89.29           Start Time:   Stop Time:   Total time in clinic (min): 46 minutes    Subjective: Pt noted that her pain in L shoulder has been less often.       Objective: See treatment diary below      Assessment: Continued with treatment session with focus on ROM at this time was able to complete increase ROM with less tolerance.  Tolerated treatment well. Patient exhibited good technique with therapeutic exercises and would benefit from continued PT  No questions/ concerns at the end of treatment session.     Plan: Continue per plan of care.      Precautions: Standard  Per Ortho 23: \"Left shoulder adhesive capsulitis. Focus on passive and active range of motion, pulleys. Postural exercises. Avoid strengthening at this time. Modalities as indicated\"  Access Code: [email or web address]  POC expires Unit limit Auth  expiration date PT/OT + Visit Limit?   24 N/A 24 120 per plan year, 18 used                 Visit/Unit Tracking  AUTH Status:  Date 3/21 3/28  2/1 2/12 2/15 2/22 2/26 2/29 3/4 3/11 3/14   120 per plan year Used 31 32  22 23 24 25 26 27 28 29 30    Remaining  89 88  98 97 96 95 94 93 92 91 90        Manuals 3/11 3/14 3/21 3/28  2/15 2/22 2/26 2/29 3/   L shoulder PROM  SC TS BE SC  BE SC BE SC BE   L glenohumeral post/inf mobs  TS - g3/g4 BE Grade 3-4   NV  BE  Grade 3-4  BE  Grade 3-4                Re-evaluation      BE       Neuro Re-Ed                                                                                                        Ther Ex             UBE fwd and retro for shoulder ROM and postural muscles 3'/3 3'/3'  3'/3' 3'/3'  3'/3' 3'/3' 3'/3' 3'/3'  3'/3'   Pulleys flex 5 min  5'  5 min   5 min 5 " "min  5 min 5 min  5 min   Finger ladder flex, scapt   10x5\"  10x 5\"         Sleeper stretch sidelying vs standing at wall             Supine cane flex AAROM   5\"x15   5\" x15 5\" 15x       Supine cane ER AAROM      5\" x10 5\" 10x  5\"x10 5\" 10x  5\"x10   Standing shoulder flex and scapt stretches with cane       5\"x15 scaption 5\"[id number] 5\" x10 flex and scapt  5\" x10 flex and scapt     Shoulder IR stretch with cane vs strap With strap 5\" 10x  With strap 5\" 10x With strap 5\" 10x Cane 5\" 10x  With cane   10\"x10  With cane   10\"x4 p!   With strap 5\" x10   Wall slides flex 5\" x 15 5\" x 15 5\"x20 with ecc lower 5\"x20 with ecc lower    5\"x10  5\" 10x  5\"x15   Shoulder ext stretch with cane 5\"10x  5\" 10x 5\" 10x 5\" 10x       5\"x10   Pt education      HEP review       Ther Activity                                       Gait Training                                       Modalities                                              "

## 2024-04-15 ENCOUNTER — EVALUATION (OUTPATIENT)
Dept: PHYSICAL THERAPY | Facility: CLINIC | Age: 49
End: 2024-04-15
Payer: COMMERCIAL

## 2024-04-15 DIAGNOSIS — M25.512 CHRONIC LEFT SHOULDER PAIN: ICD-10-CM

## 2024-04-15 DIAGNOSIS — G89.29 CHRONIC LEFT SHOULDER PAIN: ICD-10-CM

## 2024-04-15 DIAGNOSIS — M75.02 ADHESIVE CAPSULITIS OF LEFT SHOULDER: Primary | ICD-10-CM

## 2024-04-15 PROCEDURE — 97110 THERAPEUTIC EXERCISES: CPT

## 2024-04-15 PROCEDURE — 97140 MANUAL THERAPY 1/> REGIONS: CPT

## 2024-04-15 NOTE — PROGRESS NOTES
"PT Discharge    Today's date: 4/15/2024  Patient name: Zaria Duke  : 1975  MRN: [id number]  Referring provider: Freda Treadwell PA-C  Dx:   Encounter Diagnosis     ICD-10-CM    1. Adhesive capsulitis of left shoulder  M75.02       2. Chronic left shoulder pain  M25.512     G89.29                      Assessment  Assessment details: Zaria Duke is a 48 y.o. female presenting to physical therapy for a reevaluation with a MD referral of adhesive capsulitis of left shoulder. Since their initial evaluation, she reports 70% improvement in her shoulder. The patient has demonstrated improved shoulder active and passive ROM to WFL as well as  pain in the shoulder with functional activities and sleep. She reports improved functional use of her arm for household kitchen reaching, lifting, etc. She does continue to have pain in the shoulder with deficits for shoulder ER and IR ROM. She is independent with her HEP and is to be DC to a comprehensive HEP at this time. She was advised on continued performance of her HEP to maintain and progress her ROM to WNL, however advised her that true resolution of adhesive capsulitis can be up to one year. She was encouraged to reach out to office should her shoulder pain/motion worsen.       Impairments: abnormal or restricted ROM, activity intolerance, impaired physical strength, lacks appropriate home exercise program and pain with function    Symptom irritability: moderateUnderstanding of Dx/Px/POC: excellent   Prognosis: good    Goals  STG to be achieved in 4 weeks:   The patient will report a decrease in left shoulder \"at worst\" subjective pain rating score to at least 6/10 to allow for improved tolerance for functional activities.  MET  The patient will increase left shoulder abduction AROM to at least 110 degrees to allow for improved functional mobility. MET  The patient will increase left shoulder flexion AROM to at least 120 degrees to allow for improved " functional mobility. MET    LTG to be achieved by DC:   The patient will be independent in comprehensive HEP. MET  The patient will report no pain with usual activities. NOT MET  The patient will improve all left shoulder AROM to WFL to allow for improved functional mobility. MET  The patient will increase all left shoulder MMT score to WFL to allow for improved functional mobility. NOT MET  The patient will be able to complete all household lifting (laundry basket, dishes, closets, etc) without pain or difficulty.MET  The patient will be able to complete all ADLs without pain or difficulty. MET      Plan  Plan of Care beginning date: 4/15/2024  Plan of Care expiration date: 4/15/2024  Treatment plan discussed with: patient        Subjective Evaluation    History of Present Illness  Mechanism of injury: Zaria reports 70% improvement since beginning PT services. She reports that her range of motion since initial evaluation has improved. She reports that she feels her can reach her hand up higher and with less pain associated especially since her injection. She says that she can also lay on that side now which is something she wasn't able to do previously. She says that after a period of time, she has to roll off of it because it starts becoming painful. She reports that moderate weight objects such as a gallon of milk she can carry without any difficulty, however for heavier objects such as rice and case of water she has to use two hands. She reports continued pain and inability to reach behind her back to put on/take off her bra. She does continue to do her stretches every other day as she can. She says that when working at her desk and typing she notices a pulsating position in her lateral upper arm, however says that this only lasts 2-3 seconds and then resolves. She believes that this is because her shoulder is in the same position for extended periods of time.   Patient Goals  Patient goals for therapy:  decreased pain, increased strength, independence with ADLs/IADLs and increased motion  Patient goal: to be back to normal  Pain  Current pain ratin  At best pain ratin  At worst pain ratin  Location: left shoulder, radiating down left arm to elbow  Quality: dull ache and throbbing  Relieving factors: change in position, medications and rest  Aggravating factors: overhead activity and lifting  Progression: improved    Social Support  Lives with: spouse and young children    Employment status: working  Hand dominance: right      Diagnostic Tests  X-ray: normal  Treatments  Previous treatment: medication and physical therapy  Current treatment: physical therapy        Objective     Palpation   Left   No palpable tenderness to the biceps, infraspinatus, levator scapulae, supraspinatus, teres major, teres minor and upper trapezius.     Tenderness     Left Shoulder   Tenderness in the AC joint, biceps tendon (proximal) and supraspinatus tendon. No tenderness in the bicipital groove, infraspinatus tendon, lateral scapula, medial scapula and scapular spine.     Active Range of Motion   Left Shoulder   Flexion: 120 degrees   Abduction: 118 degrees with pain  External rotation BTH: T1 (UT compensation) with pain  Internal rotation BTB: Active internal rotation behind the back: L greater trochanter. with pain    Right Shoulder   Flexion: WFL  Abduction: WFL  External rotation BTH: T3   Internal rotation BTB: T6     Passive Range of Motion   Left Shoulder   Flexion: 140 degrees with pain  Abduction: 138 degrees with pain  External rotation 45°: 52 degrees with pain  Internal rotation 45°: 45 degrees with pain    Right Shoulder   Normal passive range of motion    Additional Passive Range of Motion Details  Capsular end feel with all L shoulder PROM     Joint Play   Left Shoulder  Hypomobile in the posterior capsule and inferior capsule.    Strength/Myotome Testing     Left Shoulder     Planes of Motion   Flexion: 4  "  Abduction: 3+ (pain)   External rotation at 0°: 3+ (pain)   Internal rotation at 0°: 4     Right Shoulder     Planes of Motion   Flexion: 5   Abduction: 5   External rotation at 0°: 5   Internal rotation at 0°: 5              Precautions: Standard  Per Ortho 12/19/23: \"Left shoulder adhesive capsulitis. Focus on passive and active range of motion, pulleys. Postural exercises. Avoid strengthening at this time. Modalities as indicated\"  Access Code: [email or web address]  POC expires Unit limit Auth  expiration date PT/OT + Visit Limit?   4/8/24 N/A 5/31/24 120 per plan year, 18 used                 Visit/Unit Tracking  AUTH Status:  Date 3/21 3/28 4/15  2/12 2/15 2/22 2/26 2/29 3/4 3/11 3/14   120 per plan year Used 31 32 33  23 24 25 26 27 28 29 30    Remaining  89 88 87  97 96 95 94 93 92 91 90        Manuals 3/11 3/14 3/21 3/28 4/15 2/15 2/22 2/26 2/29 3/4   L shoulder PROM  SC TS BE SC BE BE SC BE SC BE   L glenohumeral post/inf mobs  TS - g3/g4 BE Grade 3-4  BE Grade 3-4 NV  BE  Grade 3-4  BE  Grade 3-4                Re-evaluation     BE BE       Neuro Re-Ed                                                                                                        Ther Ex             UBE fwd and retro for shoulder ROM and postural muscles 3'/3 3'/3'  3'/3' 3'/3'  3'/3' 3'/3' 3'/3' 3'/3'  3'/3'   Pulleys flex 5 min  5'  5 min  5 min 5 min 5 min  5 min 5 min  5 min   Finger ladder flex, scapt   10x5\"  10x 5\"         Sleeper stretch sidelying vs standing at wall     Sidelying shoulder ER/IR    5\"x10 ea dir         Supine cane flex AAROM   5\"x15   5\" x15 5\" 15x       Supine cane ER AAROM      5\" x10 5\" 10x  5\"x10 5\" 10x  5\"x10   Standing shoulder flex and scapt stretches with cane       5\"x15 scaption 5\"[id number] 5\" x10 flex and scapt  5\" x10 flex and scapt     Shoulder IR stretch with cane vs strap With strap 5\" 10x  With strap 5\" 10x With strap 5\" 10x Cane 5\" 10x  With cane   10\"x10  With cane   10\"x4 p!   With strap 5\" x10 " "  Wall slides flex 5\" x 15 5\" x 15 5\"x20 with ecc lower 5\"x20 with ecc lower    5\"x10  5\" 10x  5\"x15   Shoulder ext stretch with cane 5\"10x  5\" 10x 5\" 10x 5\" 10x       5\"x10   Pt education     HEP review HEP review       Ther Activity                                       Gait Training                                       Modalities                                                "

## 2024-04-19 ENCOUNTER — TELEPHONE (OUTPATIENT)
Dept: FAMILY MEDICINE CLINIC | Facility: CLINIC | Age: 49
End: 2024-04-19

## 2024-04-19 DIAGNOSIS — R42 VERTIGO: Primary | ICD-10-CM

## 2024-04-19 RX ORDER — MECLIZINE HYDROCHLORIDE 25 MG/1
TABLET ORAL
Qty: 30 TABLET | Refills: 1 | Status: SHIPPED | OUTPATIENT
Start: 2024-04-19

## 2024-04-22 NOTE — PROGRESS NOTES
PT Evaluation     Today's date: 1/15/2024  Patient name: Zaria Duke  : 1975  MRN: [id number]  Referring provider: Freda Treadwell PA-C  Dx:   Encounter Diagnosis     ICD-10-CM    1. Adhesive capsulitis of left shoulder  M75.02                      Assessment  Assessment details: Zaria Duke is a 48 y.o. female who presents with {impairments:13716}. Due to these impairments, Patient has difficulty performing {Functional Limitations:52924}. Patient's clinical presentation is consistent with their referring diagnosis of ***. Patient would benefit from skilled physical therapy to address their aforementioned impairments, improve their level of function and to improve their overall quality of life.  Impairments: abnormal muscle firing, abnormal or restricted ROM, activity intolerance, impaired physical strength, lacks appropriate home exercise program, pain with function, scapular dyskinesis and poor posture   Understanding of Dx/Px/POC: excellent  Goals  Short Term Goals: to be achieved by 4 weeks  1) Patient to be independent with basic HEP.  2) Decrease pain to {#:64147}/10 at it's worst.  3) Increase UE strength by 1/2 MMT grade in all deficient planes.  4) Increase UE ROM by > 5 deg in all deficient planes.  5) Patient to report decreased sleep interruption secondary to pain.    Long Term Goals: to be achieved by discharge  1) FOTO equal to or greater than ***.  2) Patient to be independent with comprehensive HEP.  3) Abolish pain for improved quality of life.  4) Increase UE strength to 5/5 MMT grade in all deficient planes to improve a/iadls.  5) Increase UE ROM to within 5 deg of contralateral UE to improve a/iadls.  6) Patient to report no sleep interruption secondary to pain.    Plan  Patient would benefit from: skilled PT  Planned modality interventions: biofeedback, cryotherapy, hydrotherapy, unattended electrical stimulation, thermotherapy: hydrocollator packs and low level laser  Subjective   Patient ID: Drake López is a 5 m.o. female who presents for Fussy, Gas, and discolored stool.  HPI  Drake is here today with dad.  The whole family has had cold symptoms with cough and runny nose.  She started last night with being fussy.  She had no fever but was refusing to be lay down.  She also had some green stools.  Review of Systems   All other systems reviewed and are negative.      Objective   .vitals    Physical Exam  .Normal general: Alert, nontoxic.  Hydration: Normal.  Head/face: NC/AT  Eyes: Sclera clear.  Lids normal,   Ears: Canals normal           Right TM normal           Left TM normal.  Mouth/throat: Tonsils normal.  No erythema no exudate.  Nose-sinuses: Maxillary/frontal nontender                         Turbinates normal, no rhinorrhea or crusting.  Neck: Supple, no nodes   Lungs: Clear no wheeze, rales, good breath sounds good effort.  Heart: RRR no murmur.  Chest: No retractions  Assessment/Plan   Diagnoses and all orders for this visit:  Upper respiratory tract infection, unspecified type  Please push fluids today.  I also recommend simply saline saline spray for her nose.  If she develops a fever she needs to come back in.  You can also try warm shower and elevation by holding.  If at any point she seems significantly worse please let us know.    Vivian Cortes MD   "therapy  Planned therapy interventions: activity modification, ADL retraining, behavior modification, body mechanics training, functional ROM exercises, home exercise program, IADL retraining, joint mobilization, manual therapy, massage, neuromuscular re-education, patient education, postural training, strengthening, stretching, therapeutic activities and therapeutic exercise  Frequency: 2-3x week.  Duration in weeks: 12  Plan of Care beginning date: 1/15/2024  Plan of Care expiration date: 4/8/2024  Treatment plan discussed with: patient      Subjective Evaluation    Hand dominance: left      Diagnostic Tests  X-ray: normal (Left shoulder: \"No acute osseous abnormality.\")  Treatments  Previous treatment: physical therapy      Objective             POC expires Unit limit Auth  expiration date PT/OT + Visit Limit?   4/8 *** *** ***                 Visit/Unit Tracking  AUTH Status:  Date 1/15              *** Used 1               Remaining                    Precautions: A/PROM only, NO strengthening      Manuals 1/15                                                                Neuro Re-Ed                                                                                                        Ther Ex                                                                                                                     Ther Activity                                       Gait Training                                       Modalities                                            "

## 2024-05-16 ENCOUNTER — OFFICE VISIT (OUTPATIENT)
Dept: FAMILY MEDICINE CLINIC | Facility: CLINIC | Age: 49
End: 2024-05-16
Payer: COMMERCIAL

## 2024-05-16 VITALS
TEMPERATURE: 98.5 F | DIASTOLIC BLOOD PRESSURE: 70 MMHG | BODY MASS INDEX: 35.51 KG/M2 | WEIGHT: 193 LBS | HEIGHT: 62 IN | OXYGEN SATURATION: 98 % | SYSTOLIC BLOOD PRESSURE: 114 MMHG | HEART RATE: 96 BPM

## 2024-05-16 DIAGNOSIS — Z91.09 ENVIRONMENTAL ALLERGIES: Primary | ICD-10-CM

## 2024-05-16 DIAGNOSIS — Z12.4 CERVICAL CANCER SCREENING: ICD-10-CM

## 2024-05-16 DIAGNOSIS — R09.81 SINUS CONGESTION: ICD-10-CM

## 2024-05-16 DIAGNOSIS — Z12.31 ENCOUNTER FOR SCREENING MAMMOGRAM FOR BREAST CANCER: ICD-10-CM

## 2024-05-16 DIAGNOSIS — Z12.11 COLON CANCER SCREENING: ICD-10-CM

## 2024-05-16 DIAGNOSIS — H10.13 ALLERGIC CONJUNCTIVITIS OF BOTH EYES: ICD-10-CM

## 2024-05-16 DIAGNOSIS — H66.92 LEFT OTITIS MEDIA, UNSPECIFIED OTITIS MEDIA TYPE: ICD-10-CM

## 2024-05-16 PROCEDURE — 99214 OFFICE O/P EST MOD 30 MIN: CPT

## 2024-05-16 RX ORDER — CETIRIZINE HYDROCHLORIDE 10 MG/1
10 TABLET ORAL DAILY
Qty: 30 TABLET | Refills: 0 | Status: SHIPPED | OUTPATIENT
Start: 2024-05-16

## 2024-05-16 RX ORDER — AMOXICILLIN 875 MG/1
875 TABLET, COATED ORAL 2 TIMES DAILY
Qty: 14 TABLET | Refills: 0 | Status: SHIPPED | OUTPATIENT
Start: 2024-05-16 | End: 2024-05-23

## 2024-05-16 RX ORDER — OLOPATADINE HYDROCHLORIDE 1 MG/ML
1 SOLUTION/ DROPS OPHTHALMIC 2 TIMES DAILY
Qty: 5 ML | Refills: 0 | Status: SHIPPED | OUTPATIENT
Start: 2024-05-16

## 2024-05-16 RX ORDER — METHYLPREDNISOLONE 4 MG/1
TABLET ORAL
Qty: 21 EACH | Refills: 0 | Status: SHIPPED | OUTPATIENT
Start: 2024-05-16

## 2024-05-16 RX ORDER — MONTELUKAST SODIUM 10 MG/1
10 TABLET ORAL
Qty: 30 TABLET | Refills: 0 | Status: SHIPPED | OUTPATIENT
Start: 2024-05-16

## 2024-05-16 NOTE — ASSESSMENT & PLAN NOTE
- significant sinus congestion present due to allergies; patient notes only thing that usually helps when she is this congested is steroids  - therefore prescribed medrol pack, discussed side effects of medication in depth

## 2024-05-16 NOTE — PROGRESS NOTES
Ambulatory Visit  Name: Zaria Duke      : 1975      MRN: [id number]  Encounter Provider: Veronica Wallis PA-C  Encounter Date: 2024   Encounter department: [facility]    Assessment & Plan   1. Environmental allergies  Assessment & Plan:  - chronic seasonal allergies, minimal relief with OTC antihistamine  - will start on Singulair 10 mg HS and Zrytec 10 mg in the morning  - referral to allergist placed  - advised to avoid triggers   Orders:  -     montelukast (SINGULAIR) 10 mg tablet; Take 1 tablet (10 mg total) by mouth daily at bedtime  -     Ambulatory Referral to Allergy; Future  -     cetirizine (ZyrTEC) 10 mg tablet; Take 1 tablet (10 mg total) by mouth daily  2. Left otitis media, unspecified otitis media type  Assessment & Plan:  - on exam, erythematous bulging left TM with erythematous right TM   - based on exam findings and complaint of ear pain, treating for OM with amoxicillin; discussed side effects of medication   - otherwise continue supportive care  Orders:  -     amoxicillin (AMOXIL) 875 mg tablet; Take 1 tablet (875 mg total) by mouth 2 (two) times a day for 7 days  3. Allergic conjunctivitis of both eyes  Assessment & Plan:  - erythematous injected conjunctiva bilaterally with watery discharge   - suspect allergic conjunctivitis; prescribed olopatadine drops to use BID for symptom relief   Orders:  -     olopatadine (PATANOL) 0.1 % ophthalmic solution; Administer 1 drop to both eyes 2 (two) times a day  4. Sinus congestion  Assessment & Plan:  - significant sinus congestion present due to allergies; patient notes only thing that usually helps when she is this congested is steroids  - therefore prescribed medrol pack, discussed side effects of medication in depth   Orders:  -     methylPREDNISolone 4 MG tablet therapy pack; Use as directed on package  5. Encounter for screening mammogram for breast cancer  -     Mammo screening bilateral w 3d & cad; Future;  Expected date: 05/16/2024  6. Colon cancer screening  -     Ambulatory Referral to Gastroenterology; Future  7. Cervical cancer screening  -     Ambulatory Referral to Obstetrics / Gynecology; Future         History of Present Illness     CC: chronic allergies    Patient presents for evaluation of erythematous, itchy, watery eyes along with severe sinus congestion, ear pain, and cough x 1 week. Patient notes every year around this time her allergies get very bad and end up causing these symptoms. She has been trying to take Claritin which has not been helping. She would like to see an allergist to discuss allergy shots.   Patient also notes every time she gets congested like this it seems to trigger her vertigo - however she has meclizine at home which helps.       Review of Systems   Constitutional:  Negative for appetite change, chills, diaphoresis, fatigue and fever.   HENT:  Positive for congestion, ear pain, postnasal drip, rhinorrhea, sinus pressure and sinus pain. Negative for sore throat.    Respiratory:  Positive for cough. Negative for chest tightness, shortness of breath and wheezing.    Cardiovascular:  Negative for chest pain and palpitations.   Neurological:  Negative for dizziness, light-headedness and headaches.     Past Medical History:   Diagnosis Date    Acid reflux disease     Allergic rhinitis, seasonal     Asthma     Hiatal hernia     Migraine     Ulcer of gastroesophageal junction      Past Surgical History:   Procedure Laterality Date    CT EGD TRANSORAL BIOPSY SINGLE/MULTIPLE N/A 2/5/2019    Procedure: ESOPHAGOGASTRODUODENOSCOPY (EGD);  Surgeon: Ruben Stone DO;  Location: [facility];  Service: Gastroenterology    WISDOM TOOTH EXTRACTION       Family History   Problem Relation Age of Onset    Anxiety disorder Mother     Hyperlipidemia Mother     Anxiety disorder Sister     Anxiety disorder Daughter     Diabetes Maternal Grandmother     No Known Problems Maternal Grandfather     No Known  Problems Paternal Grandmother     No Known Problems Paternal Grandfather     No Known Problems Brother     No Known Problems Brother     Anxiety disorder Brother     No Known Problems Brother     Asthma Son     No Known Problems Son     No Known Problems Maternal Aunt     No Known Problems Maternal Aunt     No Known Problems Maternal Aunt     No Known Problems Maternal Aunt     No Known Problems Maternal Uncle     Breast cancer Neg Hx     Colon cancer Neg Hx     Ovarian cancer Neg Hx     Uterine cancer Neg Hx     Cervical cancer Neg Hx      Social History     Tobacco Use    Smoking status: Never     Passive exposure: Never    Smokeless tobacco: Never   Vaping Use    Vaping status: Never Used   Substance and Sexual Activity    Alcohol use: Not Currently     Comment: social    Drug use: No    Sexual activity: Yes     Partners: Male     Birth control/protection: None     Comment:      Current Outpatient Medications on File Prior to Visit   Medication Sig    amitriptyline (ELAVIL) 25 mg tablet Take 1 tablet (25 mg total) by mouth daily at bedtime    diclofenac sodium (VOLTAREN) 50 mg EC tablet Take 1 tablet (50 mg total) by mouth 2 (two) times a day    famotidine (PEPCID) 20 mg tablet TAKE 1 TABLET BY MOUTH TWICE A DAY    fluticasone (FLONASE) 50 mcg/act nasal spray PUT 1 SPRAY IN EACH NOSTRIL ONCE DAILY *PLAN LMTS    meclizine (ANTIVERT) 25 mg tablet TAKE 1 TABLET (25 MG TOTAL) BY MOUTH EVERY 8 (EIGHT) HOURS AS NEEDED FOR DIZZINESS FOR UP TO 10 DAYS    [DISCONTINUED] butalbital-acetaminophen-caffeine (FIORICET,ESGIC) -40 mg per tablet Take 1 tablet by mouth every 4 (four) hours as needed for headaches (Patient not taking: Reported on 8/22/2023)    [DISCONTINUED] cyclobenzaprine (FLEXERIL) 5 mg tablet Take 1 tablet (5 mg total) by mouth 3 (three) times a day as needed for muscle spasms (Patient not taking: Reported on 12/3/2023)    [DISCONTINUED] methylPREDNISolone 4 MG tablet therapy pack Use as directed  "on package (Patient not taking: Reported on 3/8/2024)     No Known Allergies  Immunization History   Administered Date(s) Administered    COVID-19, unspecified 11/06/2021, 01/07/2022     Objective     /70 (BP Location: Left arm, Patient Position: Sitting, Cuff Size: Large)   Pulse 96   Temp 98.5 °F (36.9 °C)   Ht 5' 1.5\" (1.562 m)   Wt 87.5 kg (193 lb)   SpO2 98%   BMI 35.88 kg/m²     Physical Exam  Vitals reviewed.   Constitutional:       General: She is not in acute distress.     Appearance: Normal appearance. She is not ill-appearing or diaphoretic.   HENT:      Head: Normocephalic and atraumatic.      Right Ear: Ear canal and external ear normal. There is no impacted cerumen. Tympanic membrane is erythematous. Tympanic membrane is not bulging.      Left Ear: Ear canal and external ear normal. There is no impacted cerumen. Tympanic membrane is erythematous and bulging.      Nose: Congestion present. No rhinorrhea.      Mouth/Throat:      Mouth: Mucous membranes are moist.      Pharynx: Oropharynx is clear. No oropharyngeal exudate or posterior oropharyngeal erythema.   Eyes:      General:         Right eye: Discharge (watery/clear) present.         Left eye: Discharge (watery/clear) present.     Conjunctiva/sclera:      Right eye: Right conjunctiva is injected.      Left eye: Left conjunctiva is injected.   Cardiovascular:      Rate and Rhythm: Normal rate and regular rhythm.      Pulses: Normal pulses.      Heart sounds: Normal heart sounds. No murmur heard.  Pulmonary:      Effort: Pulmonary effort is normal. No respiratory distress.      Breath sounds: Normal breath sounds. No wheezing, rhonchi or rales.   Musculoskeletal:         General: Normal range of motion.      Cervical back: Normal range of motion and neck supple.      Right lower leg: No edema.      Left lower leg: No edema.   Lymphadenopathy:      Cervical: No cervical adenopathy.   Skin:     General: Skin is warm.   Neurological:      " General: No focal deficit present.      Mental Status: She is alert.      Gait: Gait normal.   Psychiatric:         Mood and Affect: Mood normal.       Administrative Statements

## 2024-05-16 NOTE — ASSESSMENT & PLAN NOTE
- chronic seasonal allergies, minimal relief with OTC antihistamine  - will start on Singulair 10 mg HS and Zrytec 10 mg in the morning  - referral to allergist placed  - advised to avoid triggers

## 2024-05-16 NOTE — ASSESSMENT & PLAN NOTE
- erythematous injected conjunctiva bilaterally with watery discharge   - suspect allergic conjunctivitis; prescribed olopatadine drops to use BID for symptom relief

## 2024-05-16 NOTE — ASSESSMENT & PLAN NOTE
- on exam, erythematous bulging left TM with erythematous right TM   - based on exam findings and complaint of ear pain, treating for OM with amoxicillin; discussed side effects of medication   - otherwise continue supportive care

## 2024-05-26 DIAGNOSIS — R09.81 NASAL CONGESTION: ICD-10-CM

## 2024-05-26 RX ORDER — FLUTICASONE PROPIONATE 50 MCG
SPRAY, SUSPENSION (ML) NASAL
Qty: 48 ML | Refills: 1 | Status: SHIPPED | OUTPATIENT
Start: 2024-05-26

## 2024-06-07 DIAGNOSIS — Z91.09 ENVIRONMENTAL ALLERGIES: ICD-10-CM

## 2024-06-07 RX ORDER — CETIRIZINE HYDROCHLORIDE 10 MG/1
10 TABLET ORAL DAILY
Qty: 90 TABLET | Refills: 1 | Status: SHIPPED | OUTPATIENT
Start: 2024-06-07

## 2024-06-07 RX ORDER — MONTELUKAST SODIUM 10 MG/1
10 TABLET ORAL
Qty: 90 TABLET | Refills: 1 | Status: SHIPPED | OUTPATIENT
Start: 2024-06-07

## 2024-06-15 PROBLEM — H10.13 ALLERGIC CONJUNCTIVITIS OF BOTH EYES: Status: RESOLVED | Noted: 2024-05-16 | Resolved: 2024-06-15

## 2024-11-11 ENCOUNTER — NURSE TRIAGE (OUTPATIENT)
Age: 49
End: 2024-11-11

## 2024-11-11 NOTE — TELEPHONE ENCOUNTER
"Patient had period in August and then had spotting in Oct. Last Sunday, Monday< Tuesday 11/3-5 had light spotting of brown then Wed turned bright red, not soaking pads, using 3-4 pads a day. Occ small clots, no pain, no cramping. Denies any dizziness or lightheadedness.. Bleeding precautions reviewed and when to seek UC/ED care( soaking 1-2 pads per hour, heavy clots, evere pain or any other concerning symptoms) patient verbalzied understanding.  Does not have normal period cycle, patient was told she is in perimenopause.  Phone call to Michael goodman, spoke with Gayle, advised to send [employer] message to clerical for scheduling. Patient aware office will call back for appointment scheduling.       Reason for Disposition   Periods last > 7 days    Answer Assessment - Initial Assessment Questions  1. BLEEDING SEVERITY: \"Describe the bleeding that you are having.\" \"How much bleeding is there?\"       Ongoing bleeding since 11/3  2. ONSET: \"When did the bleeding begin?\" \"Is it continuing now?\"      11/3, started with spotting brown x 3 days now red  3. MENSTRUAL PERIOD: \"When was the last normal menstrual period?\" \"How is this different than your period?\"      August  4. REGULARITY: \"How regular are your periods?\"      Very irreg  5. ABDOMEN PAIN: \"Do you have any pain?\" \"How bad is the pain?\"  (e.g., Scale 0-10; none, mild, moderate, or severe)      denies  6. PREGNANCY: \"Is there any chance you are pregnant?\" \"When was your last menstrual period?\"      denies  7. BREASTFEEDING: \"Are you breastfeeding?\"      denies  8. HORMONE MEDICINES: \"Are you taking any hormone medicines, prescription or over-the-counter?\" (e.g., birth control pills, estrogen)      denies  9. BLOOD THINNER MEDICINES: \"Do you take any blood thinners?\" (e.g., Coumadin / warfarin, Pradaxa / dabigatran, aspirin)      denies  10. CAUSE: \"What do you think is causing the bleeding?\" (e.g., recent gyn surgery, recent gyn procedure; known bleeding " "disorder, cervical cancer, polycystic ovarian disease, fibroids)          perimenopause  11. HEMODYNAMIC STATUS: \"Are you weak or feeling lightheaded?\" If Yes, ask: \"Can you stand and walk normally?\"         denies  12. OTHER SYMPTOMS: \"What other symptoms are you having with the bleeding?\" (e.g., passed tissue, vaginal discharge, fever, menstrual-type cramps)        denies    Protocols used: Vaginal Bleeding - Abnormal-Adult-[place]    "

## 2024-11-18 ENCOUNTER — APPOINTMENT (OUTPATIENT)
Dept: LAB | Facility: MEDICAL CENTER | Age: 49
End: 2024-11-18
Payer: COMMERCIAL

## 2024-11-18 ENCOUNTER — OFFICE VISIT (OUTPATIENT)
Dept: OBGYN CLINIC | Facility: MEDICAL CENTER | Age: 49
End: 2024-11-18
Payer: COMMERCIAL

## 2024-11-18 VITALS
WEIGHT: 194 LBS | DIASTOLIC BLOOD PRESSURE: 70 MMHG | HEIGHT: 62 IN | SYSTOLIC BLOOD PRESSURE: 102 MMHG | BODY MASS INDEX: 35.7 KG/M2

## 2024-11-18 DIAGNOSIS — N93.9 ABNORMAL UTERINE BLEEDING (AUB): ICD-10-CM

## 2024-11-18 DIAGNOSIS — N93.9 ABNORMAL UTERINE BLEEDING (AUB): Primary | ICD-10-CM

## 2024-11-18 PROCEDURE — 85025 COMPLETE CBC W/AUTO DIFF WBC: CPT

## 2024-11-18 PROCEDURE — 36415 COLL VENOUS BLD VENIPUNCTURE: CPT

## 2024-11-18 PROCEDURE — 99214 OFFICE O/P EST MOD 30 MIN: CPT | Performed by: STUDENT IN AN ORGANIZED HEALTH CARE EDUCATION/TRAINING PROGRAM

## 2024-11-18 PROCEDURE — 83001 ASSAY OF GONADOTROPIN (FSH): CPT

## 2024-11-18 PROCEDURE — 84443 ASSAY THYROID STIM HORMONE: CPT

## 2024-11-18 NOTE — ASSESSMENT & PLAN NOTE
-1/31/24 EMB negative for malignancy, insufficient endometrial stroma to assess for hyperplasia   -CBC TSH FSH ordered   -pelvic US ordered   -discussed possibility of need to endometrial biopsy, pt would like to perform under anesthesia if indicated.   -follow up pending results     Orders:    CBC and differential; Future    TSH, 3rd generation with Free T4 reflex; Future    Follicle stimulating hormone; Future    US pelvis complete w transvaginal; Future

## 2024-11-18 NOTE — PROGRESS NOTES
Name: Zaria Duke      : 1975      MRN: [id number]  Encounter Provider: Loni Peraza DO  Encounter Date: 2024   Encounter department: [facility] [place]  :  Assessment & Plan  Abnormal uterine bleeding (AUB)  -24 EMB negative for malignancy, insufficient endometrial stroma to assess for hyperplasia   -CBC TSH FSH ordered   -pelvic US ordered   -discussed possibility of need to endometrial biopsy, pt would like to perform under anesthesia if indicated.   -follow up pending results     Orders:    CBC and differential; Future    TSH, 3rd generation with Free T4 reflex; Future    Follicle stimulating hormone; Future    US pelvis complete w transvaginal; Future        History of Present Illness     HPI  Zaria Duke is a 49 y.o. female  LMP 11/3/24 presents for irregular menses. Pt reports last year went almost one whole year without menses, however, got her period and has had irregular bleeding since. Reports menses every 1-3 months that is very light and lasts 2-3 days. This month, she had bleeding/spotting for 3 weeks total. Reports spotting today. Denies pelvic pain.     Patient was worked up in  for irregular bleeding, s/p EMB 23. She had a lot of pain and discomfort with EMB in the office. Does not want to do another office procedure.       Review of Systems   Constitutional:  Negative for appetite change, chills, fatigue and fever.   Respiratory:  Negative for cough, chest tightness, shortness of breath and wheezing.    Cardiovascular:  Negative for chest pain, palpitations and leg swelling.   Gastrointestinal:  Negative for abdominal distention, abdominal pain, constipation, diarrhea, nausea and vomiting.   Endocrine: Negative for cold intolerance, heat intolerance and polyuria.   Genitourinary:  Positive for menstrual problem and vaginal bleeding. Negative for difficulty urinating, dyspareunia, dysuria, genital sores, vaginal discharge  "and vaginal pain.   Neurological:  Negative for dizziness, weakness, light-headedness and headaches.          Objective   /70 (BP Location: Left arm, Patient Position: Sitting, Cuff Size: Large)   Ht 5' 1.5\" (1.562 m)   Wt 88 kg (194 lb)   LMP 11/03/2024   BMI 36.06 kg/m²      Physical Exam  Constitutional:       General: She is not in acute distress.     Appearance: Normal appearance. She is not ill-appearing.   Cardiovascular:      Rate and Rhythm: Normal rate.   Pulmonary:      Effort: Pulmonary effort is normal. No respiratory distress.   Abdominal:      General: Abdomen is flat. There is no distension.      Palpations: Abdomen is soft.      Tenderness: There is no abdominal tenderness. There is no guarding or rebound.   Genitourinary:     General: Normal vulva.      Exam position: Lithotomy position.      Labia:         Right: No rash, tenderness, lesion or injury.         Left: No rash, tenderness, lesion or injury.       Vagina: Normal. No foreign body. No vaginal discharge, erythema, tenderness, bleeding or lesions.      Cervix: Normal. No cervical motion tenderness, discharge, friability, lesion, erythema, cervical bleeding or eversion.      Uterus: Normal. Not enlarged, not fixed, not tender and no uterine prolapse.       Adnexa: Right adnexa normal and left adnexa normal.        Right: No mass, tenderness or fullness.          Left: No mass, tenderness or fullness.        Comments: Small amount of menstrual blood present   Musculoskeletal:      Right lower leg: No edema.      Left lower leg: No edema.   Neurological:      General: No focal deficit present.      Mental Status: She is alert and oriented to person, place, and time.   Psychiatric:         Mood and Affect: Mood normal.         Behavior: Behavior normal.         Thought Content: Thought content normal.         Judgment: Judgment normal.           "

## 2024-11-19 ENCOUNTER — RESULTS FOLLOW-UP (OUTPATIENT)
Dept: OBGYN CLINIC | Facility: CLINIC | Age: 49
End: 2024-11-19

## 2024-11-19 LAB
BASOPHILS # BLD AUTO: 0.09 THOUSANDS/ÂΜL (ref 0–0.1)
BASOPHILS NFR BLD AUTO: 1 % (ref 0–1)
EOSINOPHIL # BLD AUTO: 0.39 THOUSAND/ÂΜL (ref 0–0.61)
EOSINOPHIL NFR BLD AUTO: 5 % (ref 0–6)
ERYTHROCYTE [DISTWIDTH] IN BLOOD BY AUTOMATED COUNT: 13.2 % (ref 11.6–15.1)
FSH SERPL-ACNC: 58.6 MIU/ML
HCT VFR BLD AUTO: 44.8 % (ref 34.8–46.1)
HGB BLD-MCNC: 14.1 G/DL (ref 11.5–15.4)
IMM GRANULOCYTES # BLD AUTO: 0.02 THOUSAND/UL (ref 0–0.2)
IMM GRANULOCYTES NFR BLD AUTO: 0 % (ref 0–2)
LYMPHOCYTES # BLD AUTO: 2.26 THOUSANDS/ÂΜL (ref 0.6–4.47)
LYMPHOCYTES NFR BLD AUTO: 27 % (ref 14–44)
MCH RBC QN AUTO: 28.9 PG (ref 26.8–34.3)
MCHC RBC AUTO-ENTMCNC: 31.5 G/DL (ref 31.4–37.4)
MCV RBC AUTO: 92 FL (ref 82–98)
MONOCYTES # BLD AUTO: 0.43 THOUSAND/ÂΜL (ref 0.17–1.22)
MONOCYTES NFR BLD AUTO: 5 % (ref 4–12)
NEUTROPHILS # BLD AUTO: 5.24 THOUSANDS/ÂΜL (ref 1.85–7.62)
NEUTS SEG NFR BLD AUTO: 62 % (ref 43–75)
NRBC BLD AUTO-RTO: 0 /100 WBCS
PLATELET # BLD AUTO: 287 THOUSANDS/UL (ref 149–390)
PMV BLD AUTO: 10.8 FL (ref 8.9–12.7)
RBC # BLD AUTO: 4.88 MILLION/UL (ref 3.81–5.12)
TSH SERPL DL<=0.05 MIU/L-ACNC: 2.19 UIU/ML (ref 0.45–4.5)
WBC # BLD AUTO: 8.43 THOUSAND/UL (ref 4.31–10.16)

## 2024-11-27 ENCOUNTER — HOSPITAL ENCOUNTER (OUTPATIENT)
Dept: RADIOLOGY | Facility: MEDICAL CENTER | Age: 49
Discharge: HOME | End: 2024-11-27
Payer: COMMERCIAL

## 2024-11-27 DIAGNOSIS — N93.9 ABNORMAL UTERINE BLEEDING (AUB): ICD-10-CM

## 2024-11-27 PROCEDURE — 76856 US EXAM PELVIC COMPLETE: CPT

## 2024-11-27 PROCEDURE — 76830 TRANSVAGINAL US NON-OB: CPT

## 2024-12-16 ENCOUNTER — CONSULT (OUTPATIENT)
Dept: OBGYN CLINIC | Facility: MEDICAL CENTER | Age: 49
End: 2024-12-16
Payer: COMMERCIAL

## 2024-12-16 VITALS
DIASTOLIC BLOOD PRESSURE: 80 MMHG | HEIGHT: 62 IN | BODY MASS INDEX: 35.7 KG/M2 | WEIGHT: 194 LBS | SYSTOLIC BLOOD PRESSURE: 112 MMHG

## 2024-12-16 DIAGNOSIS — N93.9 ABNORMAL UTERINE BLEEDING (AUB): Primary | ICD-10-CM

## 2024-12-16 PROCEDURE — 99213 OFFICE O/P EST LOW 20 MIN: CPT | Performed by: STUDENT IN AN ORGANIZED HEALTH CARE EDUCATION/TRAINING PROGRAM

## 2024-12-16 NOTE — ASSESSMENT & PLAN NOTE
-1/31/23 EMB negative for malignancy, insufficient endometrial stroma to assess for hyperplasia    -11/18/24 CBC TSH wnl, FSH perimenopausal   -11/27/24 Pelvic US Uterus 8.7cm, EMT 11mm  -risks and benefits of expectant management vs repeat sampling discussed. Discussed importance of repeat sampling to rule out malignancy. Pt would like to void EMB in the office due to previous pain with procedure. Would like to continue with hysteroscopy.   -pt consented for hysteroscopy dilation and curettage. Risks, benefits, alternatives discussed. All questions answered  -follow up post procedure

## 2024-12-16 NOTE — PROGRESS NOTES
Name: Zaria Duke      : 1975      MRN: [id number]  Encounter Provider: Loni Peraza DO  Encounter Date: 2024   Encounter department: [facility] [place]  :  Assessment & Plan  Abnormal uterine bleeding (AUB)  -23 EMB negative for malignancy, insufficient endometrial stroma to assess for hyperplasia    -24 CBC TSH wnl, FSH perimenopausal   -24 Pelvic US Uterus 8.7cm, EMT 11mm  -risks and benefits of expectant management vs repeat sampling discussed. Discussed importance of repeat sampling to rule out malignancy. Pt would like to void EMB in the office due to previous pain with procedure. Would like to continue with hysteroscopy.   -pt consented for hysteroscopy dilation and curettage. Risks, benefits, alternatives discussed. All questions answered  -follow up post procedure              History of Present Illness   HPI  Zaria Duke is a 49 y.o. female  LMP 11/3/24 presents for follow up irregular menses. Pt reports she has not had any bleeding since her previous appointment on 24. Reports prior to this menses every 2-3 months that is very light, had an episode of bleeding/spotting for 3 weeks total. Denies pelvic pain.      Review of Systems   Constitutional:  Negative for appetite change, chills, fatigue and fever.   Respiratory:  Negative for cough, chest tightness, shortness of breath and wheezing.    Cardiovascular:  Negative for chest pain, palpitations and leg swelling.   Gastrointestinal:  Negative for abdominal distention, abdominal pain, constipation, diarrhea, nausea and vomiting.   Endocrine: Negative for cold intolerance, heat intolerance and polyuria.   Genitourinary:  Negative for difficulty urinating, dyspareunia, dysuria, genital sores, menstrual problem, vaginal bleeding, vaginal discharge and vaginal pain.   Neurological:  Negative for dizziness, weakness, light-headedness and headaches.          Objective   BP  "112/80 (BP Location: Left arm, Patient Position: Sitting, Cuff Size: Large)   Ht 5' 1.5\" (1.562 m)   Wt 88 kg (194 lb)   LMP 11/03/2024   BMI 36.06 kg/m²      Physical Exam  Constitutional:       General: She is not in acute distress.     Appearance: Normal appearance. She is not ill-appearing.   Cardiovascular:      Rate and Rhythm: Normal rate.   Pulmonary:      Effort: Pulmonary effort is normal. No respiratory distress.   Abdominal:      General: Abdomen is flat. There is no distension.      Palpations: Abdomen is soft.      Tenderness: There is no abdominal tenderness. There is no guarding or rebound.   Musculoskeletal:      Right lower leg: No edema.      Left lower leg: No edema.   Neurological:      General: No focal deficit present.      Mental Status: She is alert and oriented to person, place, and time.   Psychiatric:         Mood and Affect: Mood normal.         Behavior: Behavior normal.         Thought Content: Thought content normal.         Judgment: Judgment normal.           "

## 2024-12-18 ENCOUNTER — TELEPHONE (OUTPATIENT)
Dept: OBGYN CLINIC | Facility: CLINIC | Age: 49
End: 2024-12-18

## 2024-12-18 NOTE — TELEPHONE ENCOUNTER
Talked to patient she is scheduled for her surgical procedure on 1/31/2025 with Dr. Peraza at the [facility]. Patient aware of lab work needed prior to her procedure and that NO post op appointment needed.

## 2024-12-18 NOTE — TELEPHONE ENCOUNTER
----- Message from Loni Peraza DO sent at 2024  4:02 PM EST -----  Regarding: Surgery Scheduling  [facility] GYN Department  Surgery Scheduling Sheet    Patient Name: Zaria Duke  : 1975    Provider: Loni Peraza DO     Needed: no; Language: N/A    Procedure: exam under anesthesia, dilation and curettage , and diagnostic hysteroscopy    Diagnosis: AUB    Special Needs or Equipment: none    Anesthesia: IV sedation with anesthesia    Length of stay: outpatient  Does patient have comorbid conditions that will require close perioperative monitoring prior to safe discharge: no    The patient has comorbid conditions that will require close perioperative monitoring prior to safe discharge, including N/A.   This may require acute care beyond the usual and routine recovery period. As such, inpatient admission post-operatively is expected and appropriate, and anticipated hospital length of stay will be >2 midnights.    Pre-Admission Testing Needed: yes   Labs that should be ordered: cbc, type and screen, and urine pregnancy test    Order PAT that is recommended in prep for procedure?: Yes    Medical Clearance Needed: no; Provider: N/A    MA Form Signed (tubals/hysterectomy): Not Indicated    Surgical Drink Given: no     How many days out of work: 1 week(s)     How many days no drivin week(s)       Is pre op appt needed?  no  Interval for post op appt:  not needed       For Surgical Scheduler:     Surgery Scheduled On:  [place]: [facility], [facility], [facility], [facility], and [facility]    Pre-op Appt:   Post op Appt:  Consult/Medical clearance appt:   Pt was updated on the results. He had no further questions.

## 2024-12-19 ENCOUNTER — HOSPITAL ENCOUNTER (OUTPATIENT)
Dept: RADIOLOGY | Facility: MEDICAL CENTER | Age: 49
Discharge: HOME | End: 2024-12-19
Payer: COMMERCIAL

## 2024-12-19 VITALS — HEIGHT: 62 IN | WEIGHT: 194 LBS | BODY MASS INDEX: 35.7 KG/M2

## 2024-12-19 DIAGNOSIS — Z12.31 ENCOUNTER FOR SCREENING MAMMOGRAM FOR BREAST CANCER: ICD-10-CM

## 2024-12-19 PROCEDURE — 77063 BREAST TOMOSYNTHESIS BI: CPT

## 2024-12-19 PROCEDURE — 77067 SCR MAMMO BI INCL CAD: CPT

## 2024-12-20 ENCOUNTER — RESULTS FOLLOW-UP (OUTPATIENT)
Dept: FAMILY MEDICINE CLINIC | Facility: CLINIC | Age: 49
End: 2024-12-20

## 2025-01-22 NOTE — PRE-PROCEDURE INSTRUCTIONS
Pre-Surgery Instructions:   Medication Instructions    amitriptyline (ELAVIL) 25 mg tablet Uses PRN, OK to take night before surgery      Medication instructions for day surgery reviewed. Please use only a sip of water to take your instructed medications. Avoid all over the counter vitamins, supplements and NSAIDS for one week prior to surgery per anesthesia guidelines. Tylenol is ok to take as needed.     You will receive a call one business day prior to surgery with an arrival time and hospital directions. If your surgery is scheduled on a Monday, the hospital will be calling you on the Friday prior to your surgery. If you have not heard from anyone by 8pm, please call the hospital supervisor through the hospital  at [phone]. ([place] [phone] or [place] [phone]).    Do not eat or drink anything after midnight the night before your surgery, including candy, mints, lifesavers, or chewing gum. Do not drink alcohol 24hrs before your surgery. Try not to smoke at least 24hrs before your surgery.       Follow the pre surgery showering instructions as listed in the “My Surgical Experience Booklet” or otherwise provided by your surgeon's office. Do not use a blade to shave the surgical area 1 week before surgery. It is okay to use a clean electric clippers up to 24 hours before surgery. Do not apply any lotions, creams, including makeup, cologne, deodorant, or perfumes after showering on the day of your surgery. Do not use dry shampoo, hair spray, hair gel, or any type of hair products.     No contact lenses, eye make-up, or artificial eyelashes. Remove nail polish, including gel polish, and any artificial, gel, or acrylic nails if possible. Remove all jewelry including rings and body piercing jewelry.     Wear causal clothing that is easy to take on and off. Consider your type of surgery.    Keep any valuables, jewelry, piercings at home. Please bring any specially ordered equipment (sling,  braces) if indicated.    Arrange for a responsible person to drive you to and from the hospital on the day of your surgery. Please confirm the visitor policy for the day of your procedure when you receive your phone call with an arrival time.     Call the surgeon's office with any new illnesses, exposures, or additional questions prior to surgery.    Please reference your “My Surgical Experience Booklet” for additional information to prepare for your upcoming surgery.

## 2025-01-23 ENCOUNTER — APPOINTMENT (OUTPATIENT)
Dept: LAB | Facility: CLINIC | Age: 50
End: 2025-01-23
Payer: COMMERCIAL

## 2025-01-23 ENCOUNTER — LAB REQUISITION (OUTPATIENT)
Dept: LAB | Facility: HOSPITAL | Age: 50
End: 2025-01-23
Payer: COMMERCIAL

## 2025-01-23 DIAGNOSIS — Z01.818 PRE-OP TESTING: ICD-10-CM

## 2025-01-23 DIAGNOSIS — Z01.818 OTHER SPECIFIED PRE-OPERATIVE EXAMINATION: ICD-10-CM

## 2025-01-23 DIAGNOSIS — Z01.818 ENCOUNTER FOR OTHER PREPROCEDURAL EXAMINATION: ICD-10-CM

## 2025-01-23 LAB
ABO GROUP BLD: NORMAL
ANION GAP SERPL CALCULATED.3IONS-SCNC: 8 MMOL/L (ref 4–13)
BLD GP AB SCN SERPL QL: NEGATIVE
BUN SERPL-MCNC: 15 MG/DL (ref 5–25)
CALCIUM SERPL-MCNC: 9.1 MG/DL (ref 8.4–10.2)
CHLORIDE SERPL-SCNC: 107 MMOL/L (ref 96–108)
CO2 SERPL-SCNC: 26 MMOL/L (ref 21–32)
CREAT SERPL-MCNC: 0.82 MG/DL (ref 0.6–1.3)
ERYTHROCYTE [DISTWIDTH] IN BLOOD BY AUTOMATED COUNT: 12.6 % (ref 11.6–15.1)
GFR SERPL CREATININE-BSD FRML MDRD: 85 ML/MIN/1.73SQ M
GLUCOSE P FAST SERPL-MCNC: 90 MG/DL (ref 65–99)
HCT VFR BLD AUTO: 44.1 % (ref 34.8–46.1)
HGB BLD-MCNC: 14.1 G/DL (ref 11.5–15.4)
MCH RBC QN AUTO: 28.7 PG (ref 26.8–34.3)
MCHC RBC AUTO-ENTMCNC: 32 G/DL (ref 31.4–37.4)
MCV RBC AUTO: 90 FL (ref 82–98)
PLATELET # BLD AUTO: 258 THOUSANDS/UL (ref 149–390)
PMV BLD AUTO: 11 FL (ref 8.9–12.7)
POTASSIUM SERPL-SCNC: 4.4 MMOL/L (ref 3.5–5.3)
RBC # BLD AUTO: 4.92 MILLION/UL (ref 3.81–5.12)
RH BLD: POSITIVE
SODIUM SERPL-SCNC: 141 MMOL/L (ref 135–147)
SPECIMEN EXPIRATION DATE: NORMAL
WBC # BLD AUTO: 5.59 THOUSAND/UL (ref 4.31–10.16)

## 2025-01-23 PROCEDURE — 85027 COMPLETE CBC AUTOMATED: CPT

## 2025-01-23 PROCEDURE — 36415 COLL VENOUS BLD VENIPUNCTURE: CPT

## 2025-01-23 PROCEDURE — 80048 BASIC METABOLIC PNL TOTAL CA: CPT

## 2025-01-23 PROCEDURE — 86850 RBC ANTIBODY SCREEN: CPT | Performed by: STUDENT IN AN ORGANIZED HEALTH CARE EDUCATION/TRAINING PROGRAM

## 2025-01-23 PROCEDURE — 86900 BLOOD TYPING SEROLOGIC ABO: CPT | Performed by: STUDENT IN AN ORGANIZED HEALTH CARE EDUCATION/TRAINING PROGRAM

## 2025-01-23 PROCEDURE — 86901 BLOOD TYPING SEROLOGIC RH(D): CPT | Performed by: STUDENT IN AN ORGANIZED HEALTH CARE EDUCATION/TRAINING PROGRAM

## 2025-01-30 ENCOUNTER — ANESTHESIA EVENT (OUTPATIENT)
Dept: PERIOP | Facility: HOSPITAL | Age: 50
End: 2025-01-30
Payer: COMMERCIAL

## 2025-01-30 LAB
ABO GROUP BLD: NORMAL
RH BLD: POSITIVE

## 2025-01-31 ENCOUNTER — ANESTHESIA (OUTPATIENT)
Dept: PERIOP | Facility: HOSPITAL | Age: 50
End: 2025-01-31
Payer: COMMERCIAL

## 2025-01-31 ENCOUNTER — HOSPITAL ENCOUNTER (OUTPATIENT)
Facility: HOSPITAL | Age: 50
Setting detail: OUTPATIENT SURGERY
Discharge: HOME | End: 2025-01-31
Attending: STUDENT IN AN ORGANIZED HEALTH CARE EDUCATION/TRAINING PROGRAM | Admitting: STUDENT IN AN ORGANIZED HEALTH CARE EDUCATION/TRAINING PROGRAM
Payer: COMMERCIAL

## 2025-01-31 VITALS
DIASTOLIC BLOOD PRESSURE: 84 MMHG | RESPIRATION RATE: 16 BRPM | TEMPERATURE: 97.4 F | HEART RATE: 64 BPM | HEIGHT: 62 IN | SYSTOLIC BLOOD PRESSURE: 123 MMHG | OXYGEN SATURATION: 99 % | WEIGHT: 190 LBS | BODY MASS INDEX: 34.96 KG/M2

## 2025-01-31 DIAGNOSIS — Z98.890 STATUS POST HYSTEROSCOPY: Primary | ICD-10-CM

## 2025-01-31 DIAGNOSIS — N93.9 ABNORMAL UTERINE BLEEDING (AUB): ICD-10-CM

## 2025-01-31 PROBLEM — N84.0 ENDOMETRIAL POLYP: Status: ACTIVE | Noted: 2025-01-31

## 2025-01-31 PROBLEM — J45.909 ASTHMA: Status: ACTIVE | Noted: 2025-01-31

## 2025-01-31 LAB
EXT PREGNANCY TEST URINE: NEGATIVE
EXT. CONTROL: NORMAL

## 2025-01-31 PROCEDURE — 88305 TISSUE EXAM BY PATHOLOGIST: CPT | Performed by: PATHOLOGY

## 2025-01-31 PROCEDURE — 81025 URINE PREGNANCY TEST: CPT | Performed by: STUDENT IN AN ORGANIZED HEALTH CARE EDUCATION/TRAINING PROGRAM

## 2025-01-31 PROCEDURE — NC001 PR NO CHARGE: Performed by: STUDENT IN AN ORGANIZED HEALTH CARE EDUCATION/TRAINING PROGRAM

## 2025-01-31 PROCEDURE — 58558 HYSTEROSCOPY BIOPSY: CPT | Performed by: STUDENT IN AN ORGANIZED HEALTH CARE EDUCATION/TRAINING PROGRAM

## 2025-01-31 RX ORDER — ONDANSETRON 2 MG/ML
4 INJECTION INTRAMUSCULAR; INTRAVENOUS ONCE AS NEEDED
Status: DISCONTINUED | OUTPATIENT
Start: 2025-01-31 | End: 2025-01-31 | Stop reason: HOSPADM

## 2025-01-31 RX ORDER — ONDANSETRON 2 MG/ML
INJECTION INTRAMUSCULAR; INTRAVENOUS AS NEEDED
Status: DISCONTINUED | OUTPATIENT
Start: 2025-01-31 | End: 2025-01-31

## 2025-01-31 RX ORDER — SODIUM CHLORIDE, SODIUM LACTATE, POTASSIUM CHLORIDE, CALCIUM CHLORIDE 600; 310; 30; 20 MG/100ML; MG/100ML; MG/100ML; MG/100ML
75 INJECTION, SOLUTION INTRAVENOUS CONTINUOUS
Status: DISCONTINUED | OUTPATIENT
Start: 2025-01-31 | End: 2025-01-31 | Stop reason: HOSPADM

## 2025-01-31 RX ORDER — DEXAMETHASONE SODIUM PHOSPHATE 10 MG/ML
INJECTION, SOLUTION INTRAMUSCULAR; INTRAVENOUS AS NEEDED
Status: DISCONTINUED | OUTPATIENT
Start: 2025-01-31 | End: 2025-01-31

## 2025-01-31 RX ORDER — FENTANYL CITRATE/PF 50 MCG/ML
25 SYRINGE (ML) INJECTION
Refills: 0 | Status: DISCONTINUED | OUTPATIENT
Start: 2025-01-31 | End: 2025-01-31 | Stop reason: HOSPADM

## 2025-01-31 RX ORDER — FENTANYL CITRATE 50 UG/ML
INJECTION, SOLUTION INTRAMUSCULAR; INTRAVENOUS AS NEEDED
Status: DISCONTINUED | OUTPATIENT
Start: 2025-01-31 | End: 2025-01-31

## 2025-01-31 RX ORDER — PHENAZOPYRIDINE HYDROCHLORIDE 100 MG/1
100 TABLET, FILM COATED ORAL 3 TIMES DAILY PRN
Qty: 30 TABLET | Refills: 0 | Status: SHIPPED | OUTPATIENT
Start: 2025-01-31

## 2025-01-31 RX ORDER — SODIUM CHLORIDE, SODIUM LACTATE, POTASSIUM CHLORIDE, CALCIUM CHLORIDE 600; 310; 30; 20 MG/100ML; MG/100ML; MG/100ML; MG/100ML
INJECTION, SOLUTION INTRAVENOUS CONTINUOUS PRN
Status: DISCONTINUED | OUTPATIENT
Start: 2025-01-31 | End: 2025-01-31

## 2025-01-31 RX ORDER — MIDAZOLAM HYDROCHLORIDE 2 MG/2ML
INJECTION, SOLUTION INTRAMUSCULAR; INTRAVENOUS AS NEEDED
Status: DISCONTINUED | OUTPATIENT
Start: 2025-01-31 | End: 2025-01-31

## 2025-01-31 RX ORDER — SODIUM CHLORIDE, SODIUM LACTATE, POTASSIUM CHLORIDE, CALCIUM CHLORIDE 600; 310; 30; 20 MG/100ML; MG/100ML; MG/100ML; MG/100ML
125 INJECTION, SOLUTION INTRAVENOUS CONTINUOUS
Status: DISCONTINUED | OUTPATIENT
Start: 2025-01-31 | End: 2025-01-31 | Stop reason: HOSPADM

## 2025-01-31 RX ORDER — LIDOCAINE HYDROCHLORIDE 10 MG/ML
INJECTION, SOLUTION EPIDURAL; INFILTRATION; INTRACAUDAL; PERINEURAL AS NEEDED
Status: DISCONTINUED | OUTPATIENT
Start: 2025-01-31 | End: 2025-01-31

## 2025-01-31 RX ORDER — IBUPROFEN 600 MG/1
600 TABLET, FILM COATED ORAL EVERY 6 HOURS PRN
Start: 2025-01-31

## 2025-01-31 RX ORDER — ACETAMINOPHEN 325 MG/1
650 TABLET ORAL EVERY 6 HOURS PRN
Start: 2025-01-31

## 2025-01-31 RX ORDER — PROPOFOL 10 MG/ML
INJECTION, EMULSION INTRAVENOUS AS NEEDED
Status: DISCONTINUED | OUTPATIENT
Start: 2025-01-31 | End: 2025-01-31

## 2025-01-31 RX ORDER — KETOROLAC TROMETHAMINE 30 MG/ML
INJECTION, SOLUTION INTRAMUSCULAR; INTRAVENOUS AS NEEDED
Status: DISCONTINUED | OUTPATIENT
Start: 2025-01-31 | End: 2025-01-31

## 2025-01-31 RX ORDER — ACETAMINOPHEN 325 MG/1
975 TABLET ORAL EVERY 6 HOURS PRN
Status: DISCONTINUED | OUTPATIENT
Start: 2025-01-31 | End: 2025-01-31 | Stop reason: HOSPADM

## 2025-01-31 RX ORDER — IBUPROFEN 600 MG/1
600 TABLET, FILM COATED ORAL EVERY 6 HOURS PRN
Status: DISCONTINUED | OUTPATIENT
Start: 2025-01-31 | End: 2025-01-31 | Stop reason: HOSPADM

## 2025-01-31 RX ORDER — ONDANSETRON 2 MG/ML
4 INJECTION INTRAMUSCULAR; INTRAVENOUS EVERY 6 HOURS PRN
Status: DISCONTINUED | OUTPATIENT
Start: 2025-01-31 | End: 2025-01-31 | Stop reason: HOSPADM

## 2025-01-31 RX ADMIN — SODIUM CHLORIDE, SODIUM LACTATE, POTASSIUM CHLORIDE, AND CALCIUM CHLORIDE 125 ML/HR: .6; .31; .03; .02 INJECTION, SOLUTION INTRAVENOUS at 09:33

## 2025-01-31 RX ADMIN — LIDOCAINE HYDROCHLORIDE 50 MG: 10 INJECTION, SOLUTION EPIDURAL; INFILTRATION; INTRACAUDAL at 10:05

## 2025-01-31 RX ADMIN — PROPOFOL 200 MG: 10 INJECTION, EMULSION INTRAVENOUS at 10:05

## 2025-01-31 RX ADMIN — ONDANSETRON 4 MG: 2 INJECTION INTRAMUSCULAR; INTRAVENOUS at 10:52

## 2025-01-31 RX ADMIN — ACETAMINOPHEN 975 MG: 325 TABLET, FILM COATED ORAL at 12:30

## 2025-01-31 RX ADMIN — FENTANYL CITRATE 25 MCG: 50 INJECTION INTRAMUSCULAR; INTRAVENOUS at 10:50

## 2025-01-31 RX ADMIN — FENTANYL CITRATE 25 MCG: 50 INJECTION INTRAMUSCULAR; INTRAVENOUS at 11:08

## 2025-01-31 RX ADMIN — KETOROLAC TROMETHAMINE 30 MG: 30 INJECTION, SOLUTION INTRAMUSCULAR; INTRAVENOUS at 10:47

## 2025-01-31 RX ADMIN — FENTANYL CITRATE 50 MCG: 50 INJECTION INTRAMUSCULAR; INTRAVENOUS at 10:05

## 2025-01-31 RX ADMIN — SODIUM CHLORIDE, SODIUM LACTATE, POTASSIUM CHLORIDE, AND CALCIUM CHLORIDE: .6; .31; .03; .02 INJECTION, SOLUTION INTRAVENOUS at 09:58

## 2025-01-31 RX ADMIN — DEXAMETHASONE SODIUM PHOSPHATE 10 MG: 10 INJECTION, SOLUTION INTRAMUSCULAR; INTRAVENOUS at 10:27

## 2025-01-31 RX ADMIN — MIDAZOLAM 2 MG: 1 INJECTION INTRAMUSCULAR; INTRAVENOUS at 09:59

## 2025-01-31 RX ADMIN — FENTANYL CITRATE 25 MCG: 50 INJECTION INTRAMUSCULAR; INTRAVENOUS at 11:17

## 2025-01-31 RX ADMIN — FENTANYL CITRATE 25 MCG: 50 INJECTION INTRAMUSCULAR; INTRAVENOUS at 10:45

## 2025-01-31 NOTE — H&P
H&P Exam - Gynecology   Zaria Duke 49 y.o. female MRN: [id number]  Unit/Bed#: OR POOL Encounter: [id number]    Assessment & Plan     Abnormal Uterine Bleeding   -23 EMB negative for malignancy, insufficient endometrial stroma to assess for hyperplasia    -24 CBC TSH wnl, FSH perimenopausal   -24 Pelvic US Uterus 8.7cm, EMT 11mm  -will proceed with hysteroscopy dilation and curettage. Risks, benefits, alternatives discussed. All questions answered  -bleeding and infectious precautions   -follow up post procedure     History of Present Illness   HPI:  Zaria Duke is a 49 y.o. female  presents for hysteroscopy D&C secondary to abnormal uterine bleeding. Reports menses every 2-3 months that is very light, had an episode of bleeding/spotting for 3 weeks total. Denies pelvic pain.     Review of Systems   Constitutional:  Negative for appetite change, chills, fatigue and fever.   Respiratory:  Negative for cough, chest tightness, shortness of breath and wheezing.    Cardiovascular:  Negative for chest pain, palpitations and leg swelling.   Gastrointestinal:  Negative for abdominal distention, abdominal pain, constipation, diarrhea, nausea and vomiting.   Endocrine: Negative for cold intolerance, heat intolerance and polyuria.   Genitourinary:  Negative for difficulty urinating, dyspareunia, dysuria, genital sores, menstrual problem, vaginal bleeding, vaginal discharge and vaginal pain.   Neurological:  Negative for dizziness, weakness, light-headedness and headaches.       Historical Information   Past Medical History:   Diagnosis Date    Acid reflux disease     Allergic rhinitis, seasonal     Asthma     Hiatal hernia     Migraine     Miscarriage     Ulcer of gastroesophageal junction      Past Surgical History:   Procedure Laterality Date    DC EGD TRANSORAL BIOPSY SINGLE/MULTIPLE N/A 2019    Procedure: ESOPHAGOGASTRODUODENOSCOPY (EGD);  Surgeon: Ruben Stone DO;  Location: [facility];  " Service: Gastroenterology    WISDOM TOOTH EXTRACTION       OB History    Para Term  AB Living   5 3 3  2 3   SAB IAB Ectopic Multiple Live Births   1    3      # Outcome Date GA Lbr Galo/2nd Weight Sex Type Anes PTL Lv   5 Term     M Vag-Spont   ARGENIS   4 Term     F Vag-Spont   ARGENIS   3 Term     M Vag-Spont   ARGENIS   2 AB            1 SAB              Family History   Problem Relation Age of Onset    Anxiety disorder Mother     Hyperlipidemia Mother     Anxiety disorder Sister     Anxiety disorder Daughter     Diabetes Maternal Grandmother     No Known Problems Maternal Grandfather     No Known Problems Paternal Grandmother     No Known Problems Paternal Grandfather     No Known Problems Brother     No Known Problems Brother     Anxiety disorder Brother     No Known Problems Brother     Asthma Son     No Known Problems Son     No Known Problems Maternal Aunt     No Known Problems Maternal Aunt     No Known Problems Maternal Aunt     No Known Problems Maternal Aunt     No Known Problems Maternal Uncle     Breast cancer Neg Hx     Colon cancer Neg Hx     Ovarian cancer Neg Hx     Uterine cancer Neg Hx     Cervical cancer Neg Hx      Social History   Social History     Substance and Sexual Activity   Alcohol Use Not Currently    Comment: Only at a party. Rare     Social History     Substance and Sexual Activity   Drug Use No     Social History     Tobacco Use   Smoking Status Never    Passive exposure: Never   Smokeless Tobacco Never       Meds/Allergies   all current active meds have been reviewed  No Known Allergies    Objective   Vitals: Blood pressure 121/79, pulse (!) 108, temperature 98.1 °F (36.7 °C), temperature source Temporal, resp. rate 18, height 5' 2\" (1.575 m), weight 86.2 kg (190 lb), last menstrual period 2025, SpO2 96%.    No intake or output data in the 24 hours ending 25 0919    Invasive Devices:   Invasive Devices       Peripheral Intravenous Line  Duration          "    Peripheral IV 01/31/25 Right Hand <1 day                    Physical Exam  Constitutional:       General: She is not in acute distress.     Appearance: Normal appearance. She is normal weight.   Cardiovascular:      Rate and Rhythm: Normal rate.   Pulmonary:      Effort: Pulmonary effort is normal. No respiratory distress.   Abdominal:      General: There is no distension.      Palpations: There is no mass.      Tenderness: There is no abdominal tenderness. There is no guarding or rebound.   Musculoskeletal:      Right lower leg: No edema.      Left lower leg: No edema.   Neurological:      Mental Status: She is alert and oriented to person, place, and time.   Psychiatric:         Mood and Affect: Mood normal.         Lab Results: I have personally reviewed pertinent reports.    Imaging: I have personally reviewed pertinent reports.    Pathology, and Other Studies: I have personally reviewed pertinent reports.

## 2025-01-31 NOTE — ANESTHESIA POSTPROCEDURE EVALUATION
Post-Op Assessment Note    CV Status:  Stable    Pain management: adequate       Mental Status:  Alert and awake   Hydration Status:  Euvolemic   PONV Controlled:  Controlled   Airway Patency:  Patent     Post Op Vitals Reviewed: Yes    No anethesia notable event occurred.    Staff: CRNA           Last Filed PACU Vitals:  Vitals Value Taken Time   Temp 98.5 °F (36.9 °C) 01/31/25 1102   Pulse 71 01/31/25 1104   /74 01/31/25 1103   Resp 20 01/31/25 1104   SpO2 100 % 01/31/25 1104   Vitals shown include unfiled device data.

## 2025-01-31 NOTE — DISCHARGE INSTR - AVS FIRST PAGE
"Post-Gynecologic Surgery Discharge Instructions:  2. Nothing in the vagina until cleared at your post-operative visit  Call the office for fever greater than 100.4F, heavy vaginal bleeding, or increasing pain    Post Operative Pain Management:  For pain, you may take 650mg of Tylenol every 6 hours  For cramping, you may take 600mg of ibuprofen every 6 hours    You can alternate Tylenol and ibuprofen and take them \"around the clock\" to stay ahead of pain. For example, take 650mg of Tylenol at 9 AM, then 600mg of ibuprofen at 12 PM, then 650mg of Tylenol at 3 PM, and so forth.     If you have any questions regarding your post-operative course, please call your doctor.  "

## 2025-01-31 NOTE — OP NOTE
OPERATIVE REPORT  PATIENT NAME: Zaria Duke    :  1975  MRN: [id number]  Pt Location: [facility] OR ROOM 03    SURGERY DATE: 2025    Surgeons and Role:     * Loni Peraza DO - Primary     * Kizzy Álvarez MD - Assisting    Preop Diagnosis:  Abnormal uterine bleeding (AUB) [N93.9]    Post-Op Diagnosis Codes:     * Abnormal uterine bleeding (AUB) [N93.9]    Procedure(s):  DILATATION AND CURETTAGE .HYSTEROSCOPY WITH SYMPHION AND POLYPECTOMY . EXAM UNDER ANESTHESIA    Specimen(s):  ID Type Source Tests Collected by Time Destination   1 : ENDOMETRIAL POLYP AND CURRETTINGS Tissue Endometrium TISSUE EXAM Loni Peraza DO 2025 1046        Estimated Blood Loss:   Minimal    Drains:  * No LDAs found *    Anesthesia Type:   IV Sedation with Anesthesia    Operative Indications:  Abnormal uterine bleeding (AUB) [N93.9]    Operative Findings:  -cervix serially dilated to accommodate hysteroscope  -uterine cavity with ~4mm endometrial polyp originating on left lateral uterine wall, otherwise benign appearing endometrial tissue   -polypectomy performed with symphion device  -endometrial curettage performed   -hemostasis achieved without difficulty   -fluid deficit 50ml    Complications:   None    Procedure and Technique:  Patient was taken to the operating room and placed under general anesthesia without difficulty. An examination under anesthesia was performed and the above findings were noted. The patient was placed in the dorsal lithotomy position with yellow fins and prepped and draped in the normal sterile fashion. The bladder was then emptied with a sterile catheter. A weighted speculum was then placed in the posterior vaginal vault followed by a thin toothed tenaculum on the anterior lip of the cervix. The uterus was then carefully sounded to 7cm. The cervix was dilated sequentially with dilators to accommodate the hysteroscope. The hysteroscope as introduced in the uterine cavity under direct  visualization. The uterus was dilated using normal saline. The above mentioned findings were noted. Bilateral ostia were identified. The uterine cavity did contain an obvious endometrial polyp measuring 4mm and originated on the left lateral uterine wall. It contained benign appearing endometrial tissue.     The resectoscope of the symphion device was used to perform polypectomy without difficulty. The polyp was removed completely. The hysteroscope was removed. A large sharp curette was introduced into the uterine cavity. Curettage was then done in a circumferential fashion. The tenaculum was removed, silver nitrate used on tenaculum sites. Hemostasis was achieved at the end of the procedure. The weighted speculum was removed. The patient was cleaned, removed from dorsal lithotomy position, awoken from general anesthesia, and taken to the recovery room in stable condition. There were no complications and the patient tolerated the procedure well. Sponge lap and needle counts were correct times 2 at the end of the procedure.      I was present for the entire procedure.    Patient Disposition:  PACU              SIGNATURE: Loni Peraza DO  DATE: January 31, 2025  TIME: 10:50 AM

## 2025-01-31 NOTE — ANESTHESIA PREPROCEDURE EVALUATION
Procedure:  DILATATION AND CURETTAGE ,HYSTEROSCOPY, EXAM UNDER ANESTHESIA (Uterus)    Relevant Problems   ANESTHESIA (within normal limits)      CARDIO (within normal limits)   (+) Migraine   (-) Angina at rest (HCC)   (-) Angina of effort (HCC)   (-) GENAO (dyspnea on exertion)      ENDO (within normal limits)      GI/HEPATIC   (+) Gastroesophageal reflux disease without esophagitis (Well controlled)      /RENAL (within normal limits)      HEMATOLOGY (within normal limits)      MUSCULOSKELETAL   (+) Acute right-sided thoracic back pain      NEURO/PSYCH   (+) Migraine      PULMONARY   (+) Asthma (Exercise induced asthma, hasn't used inhaler for years)   (-) URI (upper respiratory infection)      Lab Results   Component Value Date    WBC 5.59 01/23/2025    HGB 14.1 01/23/2025    HCT 44.1 01/23/2025    MCV 90 01/23/2025     01/23/2025     Lab Results   Component Value Date    SODIUM 141 01/23/2025    K 4.4 01/23/2025     01/23/2025    CO2 26 01/23/2025    AGAP 8 01/23/2025    BUN 15 01/23/2025    CREATININE 0.82 01/23/2025    GLUF 90 01/23/2025    CALCIUM 9.1 01/23/2025    AST 15 11/11/2022    ALT 22 11/11/2022    ALKPHOS 91 11/11/2022    TP 7.3 11/11/2022    TBILI 0.68 11/11/2022    EGFR 85 01/23/2025         Physical Exam    Airway    Mallampati score: III  TM Distance: >3 FB  Neck ROM: full     Dental   No notable dental hx     Cardiovascular  Rhythm: regular, Rate: normal    Pulmonary   Breath sounds clear to auscultation    Other Findings  post-pubertal.      Anesthesia Plan  ASA Score- 2     Anesthesia Type- IV sedation with anesthesia with ASA Monitors.         Additional Monitors:     Airway Plan:            Plan Factors-Exercise tolerance (METS): >4 METS.    Chart reviewed. EKG reviewed.       Patient is not a current smoker.      Obstructive sleep apnea risk education given perioperatively.        Induction- intravenous.    Postoperative Plan- Plan for postoperative opioid use.     Perioperative  Resuscitation Plan - Level 1 - Full Code.       Informed Consent- Anesthetic plan and risks discussed with patient.  I personally reviewed this patient with the CRNA. Discussed and agreed on the Anesthesia Plan with the CRNA..      NPO Status:  Vitals Value Taken Time   Date of last liquid 01/30/25 01/31/25 0851   Time of last liquid 2000 01/31/25 0851   Date of last solid 01/30/25 01/31/25 0851   Time of last solid 1730 01/31/25 0851

## 2025-01-31 NOTE — ANESTHESIA POSTPROCEDURE EVALUATION
Post-Op Assessment Note    CV Status:  Stable    Pain management: adequate       Mental Status:  Alert and awake   Hydration Status:  Euvolemic   PONV Controlled:  Controlled   Airway Patency:  Patent     Post Op Vitals Reviewed: Yes    No anethesia notable event occurred.    Staff: Anesthesiologist           Last Filed PACU Vitals:  Vitals Value Taken Time   Temp 97.6 °F (36.4 °C) 01/31/25 1128   Pulse 68 01/31/25 1132   /76 01/31/25 1131   Resp 25 01/31/25 1132   SpO2 98 % 01/31/25 1132   Vitals shown include unfiled device data.    Modified James:     Vitals Value Taken Time   Activity 2 01/31/25 1128   Respiration 2 01/31/25 1128   Circulation 2 01/31/25 1128   Consciousness 2 01/31/25 1128   Oxygen Saturation 2 01/31/25 1128     Modified James Score: 10

## 2025-02-06 ENCOUNTER — RESULTS FOLLOW-UP (OUTPATIENT)
Dept: OBGYN CLINIC | Facility: CLINIC | Age: 50
End: 2025-02-06

## (undated) DEVICE — CYSTO TUBING SINGLE IRRIGATION

## (undated) DEVICE — MAYO STAND COVER: Brand: CONVERTORS

## (undated) DEVICE — GLOVE PI ULTRA TOUCH SZ.6.5

## (undated) DEVICE — PREMIUM DRY TRAY LF: Brand: MEDLINE INDUSTRIES, INC.

## (undated) DEVICE — TISSUE REMOVAL SYSTEM RESECTING DEVICE: Brand: SYMPHION

## (undated) DEVICE — GLOVE INDICATOR PI UNDERGLOVE SZ 6.5 BLUE

## (undated) DEVICE — TISSUE REMOVAL SYSTEM FLUID MANAGEMENT ACCESSORIES: Brand: SYMPHION

## (undated) DEVICE — BETHLEHEM UNIVERSAL MINOR VAG: Brand: CARDINAL HEALTH

## (undated) DEVICE — CHLORHEXIDINE 4PCT 4 OZ

## (undated) DEVICE — UNDER BUTTOCKS DRAPE W/FLUID CONTROL POUCH: Brand: CONVERTORS